# Patient Record
Sex: MALE | Race: WHITE | NOT HISPANIC OR LATINO | Employment: OTHER | URBAN - NONMETROPOLITAN AREA
[De-identification: names, ages, dates, MRNs, and addresses within clinical notes are randomized per-mention and may not be internally consistent; named-entity substitution may affect disease eponyms.]

---

## 2017-10-05 ENCOUNTER — TRANSFERRED RECORDS (OUTPATIENT)
Dept: HEALTH INFORMATION MANAGEMENT | Facility: OTHER | Age: 72
End: 2017-10-05

## 2018-12-06 ENCOUNTER — HOSPITAL ENCOUNTER (EMERGENCY)
Facility: OTHER | Age: 73
Discharge: ANOTHER HOSPITAL | End: 2018-12-06
Attending: PHYSICIAN ASSISTANT | Admitting: PHYSICIAN ASSISTANT
Payer: MEDICARE

## 2018-12-06 ENCOUNTER — APPOINTMENT (OUTPATIENT)
Dept: GENERAL RADIOLOGY | Facility: OTHER | Age: 73
End: 2018-12-06
Attending: PHYSICIAN ASSISTANT
Payer: MEDICARE

## 2018-12-06 VITALS
HEIGHT: 63 IN | DIASTOLIC BLOOD PRESSURE: 55 MMHG | TEMPERATURE: 98.2 F | HEART RATE: 120 BPM | RESPIRATION RATE: 42 BRPM | BODY MASS INDEX: 30.65 KG/M2 | SYSTOLIC BLOOD PRESSURE: 75 MMHG | OXYGEN SATURATION: 92 % | WEIGHT: 173 LBS

## 2018-12-06 DIAGNOSIS — I21.4 NSTEMI (NON-ST ELEVATED MYOCARDIAL INFARCTION) (H): ICD-10-CM

## 2018-12-06 DIAGNOSIS — I50.9 CONGESTIVE HEART FAILURE, UNSPECIFIED HF CHRONICITY, UNSPECIFIED HEART FAILURE TYPE (H): ICD-10-CM

## 2018-12-06 LAB
ALBUMIN SERPL-MCNC: 3.3 G/DL (ref 3.5–5.7)
ALBUMIN UR-MCNC: NEGATIVE MG/DL
ALP SERPL-CCNC: 41 U/L (ref 34–104)
ALT SERPL W P-5'-P-CCNC: 32 U/L (ref 7–52)
ANION GAP SERPL CALCULATED.3IONS-SCNC: 10 MMOL/L (ref 3–14)
APPEARANCE UR: CLEAR
AST SERPL W P-5'-P-CCNC: 27 U/L (ref 13–39)
BACTERIA #/AREA URNS HPF: ABNORMAL /HPF
BASOPHILS # BLD AUTO: 0.1 10E9/L (ref 0–0.2)
BASOPHILS NFR BLD AUTO: 0.7 %
BILIRUB SERPL-MCNC: 0.4 MG/DL (ref 0.3–1)
BILIRUB UR QL STRIP: NEGATIVE
BUN SERPL-MCNC: 16 MG/DL (ref 7–25)
CALCIUM SERPL-MCNC: 8.7 MG/DL (ref 8.6–10.3)
CHLORIDE SERPL-SCNC: 105 MMOL/L (ref 98–107)
CO2 SERPL-SCNC: 20 MMOL/L (ref 21–31)
COLOR UR AUTO: YELLOW
CREAT SERPL-MCNC: 0.99 MG/DL (ref 0.7–1.3)
D DIMER PPP DDU-MCNC: 281 NG/ML D-DU (ref 0–230)
DIFFERENTIAL METHOD BLD: ABNORMAL
EOSINOPHIL # BLD AUTO: 0.2 10E9/L (ref 0–0.7)
EOSINOPHIL NFR BLD AUTO: 2.4 %
ERYTHROCYTE [DISTWIDTH] IN BLOOD BY AUTOMATED COUNT: 13.2 % (ref 10–15)
GFR SERPL CREATININE-BSD FRML MDRD: 75 ML/MIN/1.7M2
GLUCOSE SERPL-MCNC: 152 MG/DL (ref 70–105)
GLUCOSE UR STRIP-MCNC: NEGATIVE MG/DL
HCO3 BLD-SCNC: 21 MMOL/L (ref 22–28)
HCT VFR BLD AUTO: 39.7 % (ref 40–53)
HGB BLD-MCNC: 13.2 G/DL (ref 13.3–17.7)
HGB UR QL STRIP: NEGATIVE
IMM GRANULOCYTES # BLD: 0.1 10E9/L (ref 0–0.4)
IMM GRANULOCYTES NFR BLD: 0.6 %
INR PPP: 1.17 (ref 0–1.3)
KETONES UR STRIP-MCNC: NEGATIVE MG/DL
LEUKOCYTE ESTERASE UR QL STRIP: ABNORMAL
LYMPHOCYTES # BLD AUTO: 1.9 10E9/L (ref 0.8–5.3)
LYMPHOCYTES NFR BLD AUTO: 23.1 %
MCH RBC QN AUTO: 31.1 PG (ref 26.5–33)
MCHC RBC AUTO-ENTMCNC: 33.2 G/DL (ref 31.5–36.5)
MCV RBC AUTO: 94 FL (ref 78–100)
MONOCYTES # BLD AUTO: 0.9 10E9/L (ref 0–1.3)
MONOCYTES NFR BLD AUTO: 10.4 %
NEUTROPHILS # BLD AUTO: 5.3 10E9/L (ref 1.6–8.3)
NEUTROPHILS NFR BLD AUTO: 62.8 %
NITRATE UR QL: NEGATIVE
NON-SQ EPI CELLS #/AREA URNS LPF: ABNORMAL /LPF
NT-PROBNP SERPL-MCNC: 977 PG/ML (ref 0–100)
O2/TOTAL GAS SETTING VFR VENT: 28 %
OXYHGB MFR BLD: 90 %
PCO2 BLD: 31 MM HG (ref 35–45)
PH BLD: 7.45 PH (ref 7.35–7.45)
PH UR STRIP: 5.5 PH (ref 5–9)
PLATELET # BLD AUTO: 342 10E9/L (ref 150–450)
PO2 BLD: 63 MM HG (ref 83–108)
POTASSIUM SERPL-SCNC: 3.9 MMOL/L (ref 3.5–5.1)
PROT SERPL-MCNC: 7.1 G/DL (ref 6.4–8.9)
RBC # BLD AUTO: 4.24 10E12/L (ref 4.4–5.9)
RBC #/AREA URNS AUTO: ABNORMAL /HPF
SODIUM SERPL-SCNC: 135 MMOL/L (ref 134–144)
SOURCE: ABNORMAL
SP GR UR STRIP: 1.02 (ref 1–1.03)
TROPONIN I SERPL-MCNC: 0.19 UG/L (ref 0–0.03)
UROBILINOGEN UR STRIP-ACNC: 0.2 EU/DL (ref 0.2–1)
WBC # BLD AUTO: 8.4 10E9/L (ref 4–11)
WBC #/AREA URNS AUTO: ABNORMAL /HPF

## 2018-12-06 PROCEDURE — 82805 BLOOD GASES W/O2 SATURATION: CPT | Performed by: PHYSICIAN ASSISTANT

## 2018-12-06 PROCEDURE — A9270 NON-COVERED ITEM OR SERVICE: HCPCS | Mod: GY | Performed by: PHYSICIAN ASSISTANT

## 2018-12-06 PROCEDURE — 99285 EMERGENCY DEPT VISIT HI MDM: CPT | Mod: 25 | Performed by: PHYSICIAN ASSISTANT

## 2018-12-06 PROCEDURE — 85379 FIBRIN DEGRADATION QUANT: CPT | Performed by: PHYSICIAN ASSISTANT

## 2018-12-06 PROCEDURE — 93005 ELECTROCARDIOGRAM TRACING: CPT | Performed by: PHYSICIAN ASSISTANT

## 2018-12-06 PROCEDURE — 85025 COMPLETE CBC W/AUTO DIFF WBC: CPT | Performed by: PHYSICIAN ASSISTANT

## 2018-12-06 PROCEDURE — 85610 PROTHROMBIN TIME: CPT | Performed by: PHYSICIAN ASSISTANT

## 2018-12-06 PROCEDURE — 40000275 ZZH STATISTIC RCP TIME EA 10 MIN

## 2018-12-06 PROCEDURE — 84484 ASSAY OF TROPONIN QUANT: CPT | Performed by: PHYSICIAN ASSISTANT

## 2018-12-06 PROCEDURE — 96365 THER/PROPH/DIAG IV INF INIT: CPT | Performed by: PHYSICIAN ASSISTANT

## 2018-12-06 PROCEDURE — 99285 EMERGENCY DEPT VISIT HI MDM: CPT | Mod: Z6 | Performed by: PHYSICIAN ASSISTANT

## 2018-12-06 PROCEDURE — 83880 ASSAY OF NATRIURETIC PEPTIDE: CPT | Performed by: PHYSICIAN ASSISTANT

## 2018-12-06 PROCEDURE — 96376 TX/PRO/DX INJ SAME DRUG ADON: CPT | Performed by: PHYSICIAN ASSISTANT

## 2018-12-06 PROCEDURE — 80053 COMPREHEN METABOLIC PANEL: CPT | Performed by: PHYSICIAN ASSISTANT

## 2018-12-06 PROCEDURE — 93010 ELECTROCARDIOGRAM REPORT: CPT | Performed by: INTERNAL MEDICINE

## 2018-12-06 PROCEDURE — 25000132 ZZH RX MED GY IP 250 OP 250 PS 637: Mod: GY | Performed by: PHYSICIAN ASSISTANT

## 2018-12-06 PROCEDURE — 96375 TX/PRO/DX INJ NEW DRUG ADDON: CPT | Performed by: PHYSICIAN ASSISTANT

## 2018-12-06 PROCEDURE — 81001 URINALYSIS AUTO W/SCOPE: CPT | Performed by: PHYSICIAN ASSISTANT

## 2018-12-06 PROCEDURE — 36600 WITHDRAWAL OF ARTERIAL BLOOD: CPT | Performed by: PHYSICIAN ASSISTANT

## 2018-12-06 PROCEDURE — 71045 X-RAY EXAM CHEST 1 VIEW: CPT | Mod: TC

## 2018-12-06 PROCEDURE — 25000128 H RX IP 250 OP 636: Performed by: PHYSICIAN ASSISTANT

## 2018-12-06 RX ORDER — HEPARIN SODIUM 10000 [USP'U]/100ML
0-3500 INJECTION, SOLUTION INTRAVENOUS CONTINUOUS
Status: DISCONTINUED | OUTPATIENT
Start: 2018-12-06 | End: 2018-12-07 | Stop reason: HOSPADM

## 2018-12-06 RX ORDER — HEPARIN SODIUM 5000 [USP'U]/.5ML
60 INJECTION, SOLUTION INTRAVENOUS; SUBCUTANEOUS ONCE
Status: COMPLETED | OUTPATIENT
Start: 2018-12-06 | End: 2018-12-06

## 2018-12-06 RX ORDER — FUROSEMIDE 10 MG/ML
20 INJECTION INTRAMUSCULAR; INTRAVENOUS ONCE
Status: COMPLETED | OUTPATIENT
Start: 2018-12-06 | End: 2018-12-06

## 2018-12-06 RX ORDER — NITROGLYCERIN 0.4 MG/1
0.4 TABLET SUBLINGUAL EVERY 5 MIN PRN
Status: DISCONTINUED | OUTPATIENT
Start: 2018-12-06 | End: 2018-12-07 | Stop reason: HOSPADM

## 2018-12-06 RX ORDER — FUROSEMIDE 10 MG/ML
40 INJECTION INTRAMUSCULAR; INTRAVENOUS ONCE
Status: DISCONTINUED | OUTPATIENT
Start: 2018-12-06 | End: 2018-12-06

## 2018-12-06 RX ORDER — ASPIRIN 81 MG/1
324 TABLET, CHEWABLE ORAL ONCE
Status: COMPLETED | OUTPATIENT
Start: 2018-12-06 | End: 2018-12-06

## 2018-12-06 RX ADMIN — HEPARIN SODIUM 4700 UNITS: 10000 INJECTION INTRAVENOUS; SUBCUTANEOUS at 21:43

## 2018-12-06 RX ADMIN — FUROSEMIDE 20 MG: 10 INJECTION, SOLUTION INTRAMUSCULAR; INTRAVENOUS at 21:59

## 2018-12-06 RX ADMIN — TICAGRELOR 180 MG: 90 TABLET ORAL at 21:41

## 2018-12-06 RX ADMIN — HEPARIN SODIUM 950 UNITS/HR: 10000 INJECTION, SOLUTION INTRAVENOUS at 21:44

## 2018-12-06 RX ADMIN — ASPIRIN 81 MG 324 MG: 81 TABLET ORAL at 21:41

## 2018-12-06 ASSESSMENT — ENCOUNTER SYMPTOMS
SHORTNESS OF BREATH: 1
COUGH: 1
ABDOMINAL PAIN: 0

## 2018-12-06 NOTE — ED AVS SNAPSHOT
"     Waqas Ferro #[id number] (CSN:[id number])  (73 year old M)  (Adm: 18)     [id number]               [facility]: [phone]            Patient Demographics     Patient Name Sex          Age SSN    Waqas Ferro Male 1945 (73 year old) [id number]      PCP and Center    Primary Care Provider  Phone Center     No primary care provider on file. None         Emergency Contact(s)     None on File      Admission Information     Attending Provider Admitting Provider Admission Type Admission Date/Time    Karthik Goodwin PA  Emergency 18    Discharge Date Hospital Service Auth/Cert Status Service Area     Emergency Medicine [facility]    Unit Room/Bed Admission Status        EMERGENCY DEPT ED03/ED03 Admission (Confirmed)       Admission     Complaint    None      Hospital Account     Name Acct ID Class Status Primary Coverage    Waqas Ferro [id number] Emergency Open MEDICARE - MEDICARE            Guarantor Account (for Hospital Account #[id number])     Name Relation to Pt Service Area Active? Acct Type    Wqaas Ferro Self FCS Yes Personal/Family    Address Phone                            Coverage Information (for Hospital Account #[id number])     F/O Payor/Plan Precert #    MEDICARE/MEDICARE     Subscriber Subscriber #    Waqas Ferro [id number]    Address Phone    ATT[employer] CLAIMS  [address]  [place] [address] [phone]                                                INTERAGENCY TRANSFER FORM - PHYSICIAN ORDERS   2018                       [facility]: [phone]            Attending Provider: Karthik Goodwin PA     Allergies:  No Known Allergies    Infection:  None   Service:  EMERGENCY ME    Ht:  1.6 m (5' 3\")   Wt:  78.5 kg (173 lb)   Admission Wt:  78.5 kg (173 lb)    BMI:  30.65 kg/m 2   BSA:  1.87 m 2            ED Clinical Impression     Diagnosis Description Comment " "Added By Time Added    NSTEMI (non-ST elevated myocardial infarction) (H) [I21.4] NSTEMI (non-ST elevated myocardial infarction) (H) [I21.4]  Karthik Goodwin PA 12/6/2018  9:46 PM    Congestive heart failure, unspecified HF chronicity, unspecified heart failure type (H) [I50.9] Congestive heart failure, unspecified HF chronicity, unspecified heart failure type (H) [I50.9]  Karthik Goodwin PA 12/6/2018  9:46 PM      Hospital Problems as of 12/6/2018     None      Non-Hospital Problems as of 12/6/2018     None      Code Status History     This patient does not have a recorded code status. Please follow your organizational policy for patients in this situation.      Current Code Status     This patient does not have a recorded code status. Please follow your organizational policy for patients in this situation.         Medication Review      Notice     You have not been prescribed any medications.                                                INTERAGENCY TRANSFER FORM - NURSING   12/6/2018                       [facility]: [phone]            Attending Provider: Karthik Goodwin PA     Allergies:  No Known Allergies    Infection:  None   Service:  EMERGENCY ME    Ht:  1.6 m (5' 3\")   Wt:  78.5 kg (173 lb)   Admission Wt:  78.5 kg (173 lb)    BMI:  30.65 kg/m 2   BSA:  1.87 m 2            Advance Directives        Scanned docmt in ACP Activity?           No scanned doc        Immunizations     None      ASSESSMENT     Discharge Profile Flowsheet     GASTROINTESTINAL (ADULT,PEDIATRIC,OB)     SKIN      GI WDL  WDL 12/06/18 2101   Skin WDL  ex;color 12/06/18 2101    COMMUNICATION ASSESSMENT     Skin Color/Characteristics  flushed 12/06/18 2101    Patient's communication style  spoken language (English or Bilingual) 12/06/18 2031                      Assessment WDL (Within Defined Limits) Definitions           Skin WDL     Effective: 09/28/15    Row Information: <b>WDL " "Definition:</b> Warm; dry; intact; elastic; without discoloration; pressure points without redness<br> <font color=\"gray\"><i>Item=AS skin wdl>>List=AS skin wdl>>Version=F14</i></font>      Vitals     Vital Signs Flowsheet     VITAL SIGNS     HEIGHT AND WEIGHT      Temp  98.2  F (36.8  C) 12/06/18 2032   Height  1.6 m (5' 3\") 12/06/18 2032    Temp src  Tympanic 12/06/18 2032   Height Method  Stated 12/06/18 2032    Resp  (!)  31 12/06/18 2128   Weight  78.5 kg (173 lb) 12/06/18 2032    Pulse  120 12/06/18 2034   BSA (Calculated - sq m)  1.87 12/06/18 2032    Heart Rate  105 12/06/18 2128   BMI (Calculated)  30.71 12/06/18 2032    Pulse/Heart Rate Source  Monitor 12/06/18 2034   EKG MONITORING      BP  111/79 12/06/18 2128   Cardiac Regularity  Regular 12/06/18 2100    OXYGEN THERAPY     Cardiac Rhythm  ST 12/06/18 2100    SpO2  96 % 12/06/18 2139   KAEL COMA SCALE      O2 Device  BiPAP/CPAP 12/06/18 2139   Best Eye Response  4-->(E4) spontaneous 12/06/18 2032    FiO2 (%)  30 % 12/06/18 2139   Best Motor Response  6-->(M6) obeys commands 12/06/18 2032    PAIN/COMFORT     Best Verbal Response  5-->(V5) oriented 12/06/18 2032    0-10 Pain Scale  0 12/06/18 2032   Kael Coma Scale Score  15 12/06/18 2032            Patient Lines/Drains/Airways Status    Active LINES/DRAINS/AIRWAYS     Name: Placement date: Placement time: Site: Days: Last dressing change:    Peripheral IV 12/06/18 Right 12/06/18 2049      less than 1             Patient Lines/Drains/Airways Status    Active PICC/CVC     None            Intake/Output Detail Report     None      Case Management/Discharge Planning     Case Management/Discharge Planning Flowsheet     ABUSE RISK SCREEN     OBSERVATION: Is there reason to believe there has been maltreatment of a vulnerable adult (ie. Physical/Sexual/Emotional abuse, self neglect, lack of adequate food, shelter, medical care, or financial exploitation)?  no 12/06/18 2033    QUESTION TO PATIENT:  Has a " member of your family or a partner(now or in the past) intimidated, hurt, manipulated, or controlled you in any way?  no 18   HOMICIDE RISK      QUESTION TO PATIENT: Do you feel safe going back to the place where you are living?  yes 18   Feels Like Hurting Others  no 18                  [facility]: [phone]            Medication Administration Report for Waqas Ferro as of 18   Legend:    Given Hold Not Given Due Canceled Entry Other Actions    Time Time (Time) Time  Time-Action       Inactive    Active    Linked        Medications 18      Dose: 40 mg  Freq: ONCE Route: IV  Start: 18   End: 18   Admin Instructions: For ordered IV doses 1-40 mg, give IV Push undiluted over 1-2 minutes.    Admin. Amount: 40 mg = 4 mL Conc: 10 mg/mL  Dispense Loc: EMERGENCY DEPARTMENT  Infused Over: 1-2 Minutes  Administrations Remainin  Volume: 4 mL           2145-Med Discontinued             heparin infusion 25,000 units in D5W 250 mL  Rate: 0-35 mL/hr Dose: 0-3500 Units/hr  Freq: CONTINUOUS Route: IV  Last Dose: Stopped (18)  Start: 18   Admin Instructions: Starting Infusion Rate = 950 Units/hr (actual weight) (12 units/kg/hr).  Low Intensity Heparin Treatment.  GOAL: Heparin Xa (10a) LEVEL = 0.15-0.35 (PTT = 45-65 seconds).  Max 1000 units/hr if patient getting TNK and greater than 70 kg.  Beginning with the Heparin Xa (10a) Level collected 6 hours after starting heparin, adjust heparin according to guidelines.    Release Heparin Xa (10a) Level order for blood draw 6 hours after start of infusion and 6 hours after any dose change.    If Xa (10a) less than 0.1 see bolus orders and INCREASE by 300 units/hr     If Xa (10a) 0.1 - 0.14 see bolus orders and INCREASE by 150 units/hr     If Xa (10a) 0.15 - 0.35 NO CHANGE in rate    if Xa (10a) 0.36 - 0.48 then  REDUCE by 150 units/hr     If Xa (10a) 0.49 - 0.66 then HOLD 30 mins and REDUCE by 200 units/hr     If Xa (10a) 0.67 - 1.31 then HOLD 60 mins and REDUCE by 300 units/hr     If Xa (10a) greater than 1.31 then HOLD 60 mins and REDUCE by 350 units/hr    Last Admin: 18  Dispense Loc: EMERGENCY DEPARTMENT  Volume: 250 mL   Current Line: Peripheral IV 18 Right           (950 Units/hr)-New Bag       214 (950 Units/hr)-Handoff       -ED Infusing on Admission/transfer           nitroGLYcerin (NITROSTAT) sublingual tablet 0.4 mg  Dose: 0.4 mg  Freq: EVERY 5 MIN PRN Route: SL  PRN Reason: chest pain  Start: 18   Admin. Amount: 1 tablet (1 × 0.4 mg tablet)  Dispense Loc: EMERGENCY DEPARTMENT              Completed Medications  Medications 18         Dose: 324 mg  Freq: ONCE Route: PO  Start: 18   End: 18   Admin. Amount: 4 tablet (4 × 81 mg tablet)  Last Admin: 18  Dispense Loc: EMERGENCY DEPARTMENT  Administrations Remainin            (324 mg)-Given             Dose: 20 mg  Freq: ONCE Route: IV  Start: 18   End: 18   Admin Instructions: For ordered IV doses 1-40 mg, give IV Push undiluted over 1-2 minutes.    Admin. Amount: 20 mg = 2 mL Conc: 10 mg/mL  Last Admin: 18  Dispense Loc: EMERGENCY DEPARTMENT  Infused Over: 1-2 Minutes  Administrations Remainin  Volume: 2 mL   Current Line: Peripheral IV 18 Right           (20 mg)-Given             Dose: 60 Units/kg  Weight Dosing Info: 78.5 kg  Freq: ONCE Route: IV  Start: 18   End: 18   Admin Instructions: Max Dose: 4000 units if patient getting TNK and greater than 70 kg.  Low Intensity Heparin Treatment.    Admin. Amount: 4,700 Units = 0.47 mL Conc: 5,000 Units/0.5 mL  Last Admin: 18 2145  Dispense Loc: EMERGENCY DEPARTMENT  Administrations Remainin  Volume: 0.5 mL    Current Line: Peripheral IV 18 Right          2143 (4,700 Units)-Given        (4,700 Units)-Handoff [C]             Dose: 180 mg  Freq: ONCE Route: PO  Start: 18   End: 18   Admin. Amount: 2 tablet (2 × 90 mg tablet)  Last Admin: 18  Dispense Loc: EMERGENCY DEPARTMENT  Administrations Remainin            (180 mg)-Given                    INTERAGENCY TRANSFER FORM - NOTES (H&P, Discharge Summary, Consults, Procedures, Therapies)   2018                       [facility]: [phone]            History & Physicals     No notes of this type exist for this encounter.      Discharge Summaries     No notes of this type exist for this encounter.      Consult Notes     No notes of this type exist for this encounter.      Progress Notes - Physician (Notes for yesterday and today)     No notes of this type exist for this encounter.      Procedure Notes     No notes of this type exist for this encounter.      Progress Notes - Therapies (Notes from 18 through 18)     No notes of this type exist for this encounter.                                          INTERAGENCY TRANSFER FORM - LAB / IMAGING / EKG / EMG RESULTS   2018                       [facility]: [phone]            Unresulted Labs (24h ago through future)    Start       Ordered    18 0600  CBC with platelets  (LOW intensity:  with LOADING DOSE (for patient weight less than 100 kg))  EVERY THREE DAYS,   STAT     Comments:  Every 3 days while on heparin    18 0600  Lactic acid  AM DRAW,   Routine      18 0600  Heparin Xa (10a) Level  (LOW intensity:  with LOADING DOSE (for patient weight less than 100 kg))  DAILY,   Routine      18    Unscheduled  Heparin Xa (10a) Level  (LOW intensity:  with LOADING DOSE (for patient weight less than 100 kg))  CONDITIONAL X 1 STAT,   STAT     Comments:   Release order 6 Hours after heparin is started    12/06/18 2132    Unscheduled  Heparin Xa (10a) Level  (LOW intensity:  with LOADING DOSE (for patient weight less than 100 kg))  CONDITIONAL (SPECIFY),   Routine     Comments:  Release order 6 hours after any heparin dose change    12/06/18 2132         Lab Results - 3 Days      Urine Microscopic [[id number]] (Abnormal)  Resulted: 12/06/18 2211, Result status: Final result    Ordering provider: Karthik Goodwin PA  12/06/18 2156 Resulting lab: [facility]    Specimen Information    Type Source Collected On     12/06/18 2156          Components       Value Reference Range Flag Lab   WBC Urine 5-10 OTO5^0 - 5 /HPF A [facility]   RBC Urine O - 2 OTO2^O - 2 /HPF  [facility]   Squamous Epithelial /LPF Urine Few FEW^Few /LPF  [facility]   Bacteria Urine Few NEG^Negative /HPF A [facility]            *UA reflex to Microscopic [[id number]] (Abnormal)  Resulted: 12/06/18 2207, Result status: Final result    Ordering provider: Karthik Goodwin PA  12/06/18 2045 Resulting lab: [facility]    Specimen Information    Type Source Collected On   Unspecified Urine Urine Midstream 12/06/18 2156          Components       Value Reference Range Flag Lab   Color Urine Yellow   [facility]   Appearance Urine Clear   [facility]   Glucose Urine Negative NEG^Negative mg/dL  [facility]   Bilirubin Urine Negative NEG^Negative  [facility]   Ket[place] Urine Negative NEG^Negative mg/dL  [facility]   Specific [place] Urine 1.025 1.000 - 1.030  [facility]   Blood Urine Negative NEG^Negative  [facility]   pH Urine 5.5 5.0 - 9.0 pH  [facility]   Protein Albumin Urine Negative NEG^Negative mg/dL  [facility]   Urobilinogen Urine 0.2 0.2 - 1.0 EU/dL  [facility]   Nitrite Urine Negative NEG^Negative  [facility]   Leukocyte Esterase Urine Trace NEG^Negative A [facility]   Source Unspecified Urine   [facility]            Comprehensive metabolic panel [[id number]]  (Abnormal)  Resulted: 12/06/18 2129, Result status: Final result    Ordering provider: Karthik Goodwin PA  12/06/18 2045 Resulting lab: [facility]    Specimen Information    Type Source Collected On   Blood  12/06/18 2101          Components       Value Reference Range Flag Lab   Sodium 135 134 - 144 mmol/L  [facility]   Potassium 3.9 3.5 - 5.1 mmol/L  [facility]   Chloride 105 98 - 107 mmol/L  [facility]   Carbon Dioxide 20 21 - 31 mmol/L L [facility]   Anion Gap 10 3 - 14 mmol/L  [facility]   Glucose 152 70 - 105 mg/dL H [facility]   Urea Nitrogen 16 7 - 25 mg/dL  [facility]   Creatinine 0.99 0.70 - 1.30 mg/dL  [facility]   GFR Estimate 75 >60 mL/min/1.7m2  [facility]   GFR Estimate If Black >90 >60 mL/min/1.7m2  [facility]   Calcium 8.7 8.6 - 10.3 mg/dL  [facility]   Bilirubin Total 0.4 0.3 - 1.0 mg/dL  [facility]   Albumin 3.3 3.5 - 5.7 g/dL L [facility]   Protein Total 7.1 6.4 - 8.9 g/dL  [facility]   Alkaline Phosphatase 41 34 - 104 U/L  [facility]   ALT 32 7 - 52 U/L  [facility]   AST 27 13 - 39 U/L  [facility]            Nt probnp inpatient [[id number]] (Abnormal)  Resulted: 12/06/18 2128, Result status: Final result    Ordering provider: Karthik Goodwin PA  12/06/18 2045 Resulting lab: [facility]    Specimen Information    Type Source Collected On   Blood  12/06/18 2101          Components       Value Reference Range Flag Lab   N-Terminal Pro BNP Inpatient 977 0 - 100 pg/mL H [facility]   Comment:            Reference range shown and results flagged as abnormal are suggested inpatient   cut points for confirming diagnosis if CHF in an acute setting. Establishing a   baseline value for each individual patient is useful for follow-up. An   inpatient or emergency department NT-proPBNP <300 pg/mL effectively rules out   acute CHF, with 99% negative predictive value.  The outpatient non-acute reference range for ruling out CHF is:   0-125 pg/mL (age 18 to less  than 75)   0-450 pg/mL (age 75 yrs and older)              Troponin I [[id number]] (Abnormal)  Resulted: 12/06/18 2125, Result status: Final result    Ordering provider: Karthik Goodwin PA  12/06/18 2045 Resulting lab: [facility]    Specimen Information    Type Source Collected On   Blood  12/06/18 2101          Components       Value Reference Range Flag Lab   Troponin I ES 0.190 0.000 - 0.034 ug/L H [facility]            D-Dimer (HI,GH) [[id number]] (Abnormal)  Resulted: 12/06/18 2115, Result status: Final result    Ordering provider: Karthik Goodwin PA  12/06/18 2045 Resulting lab: [facility]    Specimen Information    Type Source Collected On   Blood  12/06/18 2101          Components       Value Reference Range Flag Lab   D-Dimer ng/mL 281 0 - 230 ng/ml D-DU H [facility]            INR [[id number]]  Resulted: 12/06/18 2115, Result status: Final result    Ordering provider: Karthik Goodwin PA  12/06/18 2045 Resulting lab: [facility]    Specimen Information    Type Source Collected On   Blood  12/06/18 2101          Components       Value Reference Range Flag Lab   INR 1.17 0 - 1.3  [facility]            CBC with platelets differential [[id number]] (Abnormal)  Resulted: 12/06/18 2109, Result status: Final result    Ordering provider: Karthik Goodwin PA  12/06/18 2045 Resulting lab: [facility]    Specimen Information    Type Source Collected On   Blood  12/06/18 2101          Components       Value Reference Range Flag Lab   WBC 8.4 4.0 - 11.0 10e9/L  [facility]   RBC Count 4.24 4.4 - 5.9 10e12/L L [facility]   Hemoglobin 13.2 13.3 - 17.7 g/dL L [facility]   Hematocrit 39.7 40.0 - 53.0 % L [facility]   MCV 94 78 - 100 fl  [facility]   MCH 31.1 26.5 - 33.0 pg  [facility]   MCHC 33.2 31.5 - 36.5 g/dL  [facility]   RDW 13.2 10.0 - 15.0 %  [facility]   Platelet Count 342 150 - 450 10e9/L  [facility]   Diff Method  Automated Method   [facility]   % Neutrophils 62.8 %  [facility]   % Lymphocytes 23.1 %  [facility]   % Monocytes 10.4 %  [facility]   % Eosinophils 2.4 %  [facility]   % Basophils 0.7 %  [facility]   % Immature Granulocytes 0.6 %  [facility]   Absolute Neutrophil 5.3 1.6 - 8.3 10e9/L  [facility]   Absolute Lymphocytes 1.9 0.8 - 5.3 10e9/L  [facility]   Absolute Monocytes 0.9 0.0 - 1.3 10e9/L  [facility]   Absolute Eosinophils 0.2 0.0 - 0.7 10e9/L  [facility]   Absolute Basophils 0.1 0.0 - 0.2 10e9/L  [facility]   Abs Immature Granulocytes 0.1 0 - 0.4 10e9/L  [facility]            Blood gas arterial and oxyhgb [[id number]] (Abnormal)  Resulted: 12/06/18 2109, Result status: Final result    Ordering provider: Karthik Goodwin PA  12/06/18 2045 Resulting lab: [facility]    Specimen Information    Type Source Collected On   Blood  12/06/18 2100          Components       Value Reference Range Flag Lab   pH Arterial 7.45 7.35 - 7.45 pH  [facility]   pCO2 Arterial 31 35 - 45 mm Hg L [facility]   pO2 Arterial 63 83 - 108 mm Hg L [facility]   Bicarbonate Arterial 21 22 - 28 mmol/L L [facility]   FIO2 28   [facility]   Oxyhemoglobin Arterial 90 >95 % L [facility]            Testing Performed By     Lab - Abbreviation Name Director Address Valid Date Range    1743 - [facility] Unknown [address]  [facility] [address] 08/07/15 0948 - Present               Imaging Results - 3 Days      XR Chest Port 1 View [[id number]]  Resulted: 12/06/18 2157, Result status: Final result    Ordering provider: Karthik Goodwin PA  12/06/18 2045 Resulted by: EVY JETT    Performed: 12/06/18 2126 - 12/06/18 2134 Resulting lab: RADIOLOGY RESULTS    Narrative:       EXAM:    XR Chest, 1 View     EXAM DATE/TIME:    12/6/2018 9:26 PM     CLINICAL HISTORY:    73 years old, male; Signs and symptoms; Shortness of breath; Additional info:   SOB, productive cough, lungs?  Infection? Heart size?     TECHNIQUE:    XR of the chest, 1 view.     COMPARISON:    No relevant prior studies available.     FINDINGS:    Lungs:  Central pulmonary vascular congestion.  No consolidation.   Pleural space:  Small bilateral pleural effusions.    Heart/Mediastinum:  Cardiomegaly.    Bones/joints: Unremarkable.    Other findings:  Shallow depth of inspiration.       Impression:       IMPRESSION:   1. Cardiomegaly.   2. Central pulmonary vascular congestion.   3. Small bilateral pleural effusions.     THIS DOCUMENT HAS BEEN ELECTRONICALLY SIGNED BY EVY JETT MD    Specimen Information    Type Source Collected On     12/06/18 2126            Testing Performed By     Lab - Abbreviation Name Director Address Valid Date Range    104 - [facility] RADIOLOGY RESULTS Unknown Unknown 02/16/05 1553 - Present            Encounter-Level Documents:     There are no encounter-level documents.      Order-Level Documents:     There are no order-level documents.

## 2018-12-07 ENCOUNTER — TRANSFERRED RECORDS (OUTPATIENT)
Dept: HEALTH INFORMATION MANAGEMENT | Facility: OTHER | Age: 73
End: 2018-12-07

## 2018-12-07 LAB — EJECTION FRACTION: 30

## 2018-12-07 NOTE — ED TRIAGE NOTES
Pt here by himself with c/o increased SOB for past few weeks, pt is tachypneic and in mild respiratory distress, pt c/o chest pain with cough and states that he is coughing up stuff, VSS, pt into bay 3 via w.c

## 2018-12-07 NOTE — ED NOTES
"Attempted to catheterize patient, could not advance into urethra. Pt states he was \"re-channeled\" when he was a teenager, but \"they didn't tell me anything\".   Jennifer Guerin    "

## 2018-12-07 NOTE — ED PROVIDER NOTES
"  History     Chief Complaint   Patient presents with     Shortness of Breath     HPI  Waqas Ferro is a 73 year old male who presents to the ED today with chief complaint of shortness of breath.  Patient reports that he has had increased shortness of breath for the past few weeks and that today he is just tired of breathing so hard.    Problem List:    There are no active problems to display for this patient.       Past Medical History:    No past medical history on file.    Past Surgical History:    No past surgical history on file.    Family History:    No family history on file.    Social History:  Marital Status:  Single [1]  Social History   Substance Use Topics     Smoking status: Not on file     Smokeless tobacco: Not on file     Alcohol use Not on file        Medications:      No current outpatient prescriptions on file.      Review of Systems   Respiratory: Positive for cough and shortness of breath.    Cardiovascular: Negative for chest pain and leg swelling.   Gastrointestinal: Negative for abdominal pain.   All other systems reviewed and are negative.      Physical Exam   BP: (!) 130/92  Pulse: 120  Heart Rate: 109  Temp: 98.2  F (36.8  C)  Resp: 30  Height: 160 cm (5' 3\")  Weight: 78.5 kg (173 lb)  SpO2: 92 %      Physical Exam   Constitutional: He is oriented to person, place, and time. He appears distressed.   HENT:   Head: Normocephalic and atraumatic.   Eyes: EOM are normal. Pupils are equal, round, and reactive to light.   Neck: Normal range of motion.   Cardiovascular: Regular rhythm and normal heart sounds.    No murmur heard.  Tachycardic     Pulmonary/Chest: Effort normal and breath sounds normal. No respiratory distress. He has no wheezes. He exhibits no tenderness.   Abdominal: Soft. Bowel sounds are normal. He exhibits no distension and no mass. There is no tenderness. There is no rebound.   Musculoskeletal: Normal range of motion.   Neurological: He is alert and oriented to person, " place, and time. No cranial nerve deficit. Coordination normal.   Skin: Skin is warm. He is not diaphoretic.   Psychiatric: He has a normal mood and affect. His behavior is normal. Judgment and thought content normal.       ED Course     ED Course     Procedures          EKG read at 2056, sinus tachycardia, , inverted T waves in 1 aVL    Critical Care time:  none               Results for orders placed or performed during the hospital encounter of 12/06/18 (from the past 24 hour(s))   Blood gas arterial and oxyhgb   Result Value Ref Range    pH Arterial 7.45 7.35 - 7.45 pH    pCO2 Arterial 31 (L) 35 - 45 mm Hg    pO2 Arterial 63 (L) 83 - 108 mm Hg    Bicarbonate Arterial 21 (L) 22 - 28 mmol/L    FIO2 28     Oxyhemoglobin Arterial 90 (L) >95 %   CBC with platelets differential   Result Value Ref Range    WBC 8.4 4.0 - 11.0 10e9/L    RBC Count 4.24 (L) 4.4 - 5.9 10e12/L    Hemoglobin 13.2 (L) 13.3 - 17.7 g/dL    Hematocrit 39.7 (L) 40.0 - 53.0 %    MCV 94 78 - 100 fl    MCH 31.1 26.5 - 33.0 pg    MCHC 33.2 31.5 - 36.5 g/dL    RDW 13.2 10.0 - 15.0 %    Platelet Count 342 150 - 450 10e9/L    Diff Method Automated Method     % Neutrophils 62.8 %    % Lymphocytes 23.1 %    % Monocytes 10.4 %    % Eosinophils 2.4 %    % Basophils 0.7 %    % Immature Granulocytes 0.6 %    Absolute Neutrophil 5.3 1.6 - 8.3 10e9/L    Absolute Lymphocytes 1.9 0.8 - 5.3 10e9/L    Absolute Monocytes 0.9 0.0 - 1.3 10e9/L    Absolute Eosinophils 0.2 0.0 - 0.7 10e9/L    Absolute Basophils 0.1 0.0 - 0.2 10e9/L    Abs Immature Granulocytes 0.1 0 - 0.4 10e9/L   Comprehensive metabolic panel   Result Value Ref Range    Sodium 135 134 - 144 mmol/L    Potassium 3.9 3.5 - 5.1 mmol/L    Chloride 105 98 - 107 mmol/L    Carbon Dioxide 20 (L) 21 - 31 mmol/L    Anion Gap 10 3 - 14 mmol/L    Glucose 152 (H) 70 - 105 mg/dL    Urea Nitrogen 16 7 - 25 mg/dL    Creatinine 0.99 0.70 - 1.30 mg/dL    GFR Estimate 75 >60 mL/min/1.7m2    GFR Estimate If Black >90  >60 mL/min/1.7m2    Calcium 8.7 8.6 - 10.3 mg/dL    Bilirubin Total 0.4 0.3 - 1.0 mg/dL    Albumin 3.3 (L) 3.5 - 5.7 g/dL    Protein Total 7.1 6.4 - 8.9 g/dL    Alkaline Phosphatase 41 34 - 104 U/L    ALT 32 7 - 52 U/L    AST 27 13 - 39 U/L   D-Dimer (HI,GH)   Result Value Ref Range    D-Dimer ng/mL 281 (H) 0 - 230 ng/ml D-DU   INR   Result Value Ref Range    INR 1.17 0 - 1.3   Troponin I   Result Value Ref Range    Troponin I ES 0.190 (H) 0.000 - 0.034 ug/L   Nt probnp inpatient   Result Value Ref Range    N-Terminal Pro BNP Inpatient 977 (H) 0 - 100 pg/mL   XR Chest Port 1 View    Narrative    EXAM:    XR Chest, 1 View     EXAM DATE/TIME:    12/6/2018 9:26 PM     CLINICAL HISTORY:    73 years old, male; Signs and symptoms; Shortness of breath; Additional info:   SOB, productive cough, lungs? Infection? Heart size?     TECHNIQUE:    XR of the chest, 1 view.     COMPARISON:    No relevant prior studies available.     FINDINGS:    Lungs:  Central pulmonary vascular congestion.  No consolidation.   Pleural space:  Small bilateral pleural effusions.    Heart/Mediastinum:  Cardiomegaly.    Bones/joints: Unremarkable.    Other findings:  Shallow depth of inspiration.       Impression    IMPRESSION:   1. Cardiomegaly.   2. Central pulmonary vascular congestion.   3. Small bilateral pleural effusions.     THIS DOCUMENT HAS BEEN ELECTRONICALLY SIGNED BY EVY JETT MD   *UA reflex to Microscopic   Result Value Ref Range    Color Urine Yellow     Appearance Urine Clear     Glucose Urine Negative NEG^Negative mg/dL    Bilirubin Urine Negative NEG^Negative    Ketones Urine Negative NEG^Negative mg/dL    Specific Gravity Urine 1.025 1.000 - 1.030    Blood Urine Negative NEG^Negative    pH Urine 5.5 5.0 - 9.0 pH    Protein Albumin Urine Negative NEG^Negative mg/dL    Urobilinogen Urine 0.2 0.2 - 1.0 EU/dL    Nitrite Urine Negative NEG^Negative    Leukocyte Esterase Urine Trace (A) NEG^Negative    Source Unspecified Urine     Urine Microscopic   Result Value Ref Range    WBC Urine 5-10 (A) OTO5^0 - 5 /HPF    RBC Urine O - 2 OTO2^O - 2 /HPF    Squamous Epithelial /LPF Urine Few FEW^Few /LPF    Bacteria Urine Few (A) NEG^Negative /HPF       Medications   heparin infusion 25,000 units in D5W 250 mL (0 Units/hr Intravenous ED Infusing on Admission/transfer 12/6/18 2211)   nitroGLYcerin (NITROSTAT) sublingual tablet 0.4 mg (not administered)   aspirin (ASA) chewable tablet 324 mg (324 mg Oral Given 12/6/18 2141)   heparin loading dose for LOW INTENSITY TREATMENT * Give BEFORE starting heparin infusion (4,700 Units Intravenous Handoff 12/6/18 2149)   ticagrelor (BRILINTA) tablet 180 mg (180 mg Oral Given 12/6/18 2141)   furosemide (LASIX) injection 20 mg (20 mg Intravenous Given 12/6/18 2159)       Assessments & Plan (with Medical Decision Making)   Patient seen and examined.  Patient appears in mild distress due to increased work of breathing.  Lung sounds slightly diminished.  Patient placed on BiPAP.  Patient has a unremarkable CBC and CMP.  Troponin of 0.190, d-dimer 281, .  Chest x-ray shows cardiomegaly with pulmonary vascular congestion as well as pleural effusions bilaterally.  Patient started on heparin bolus and drip, Brilinta, Lasix.  Patient remained hemodynamically stable while in the ED.  , [facility], accepted patient for further observation, management, treatment.  Patient was transferred.    Karthik Goodwin PA-C    I have reviewed the nursing notes.    I have reviewed the findings, diagnosis, plan and need for follow up with the patient.       New Prescriptions    No medications on file       Final diagnoses:   NSTEMI (non-ST elevated myocardial infarction) (H)   Congestive heart failure, unspecified HF chronicity, unspecified heart failure type (H)       12/6/2018   [facility] AND [facility]     Karthik Goodwin PA  12/06/18 5413

## 2018-12-09 ENCOUNTER — TRANSFERRED RECORDS (OUTPATIENT)
Dept: HEALTH INFORMATION MANAGEMENT | Facility: OTHER | Age: 73
End: 2018-12-09

## 2018-12-10 ENCOUNTER — TRANSFERRED RECORDS (OUTPATIENT)
Dept: HEALTH INFORMATION MANAGEMENT | Facility: OTHER | Age: 73
End: 2018-12-10

## 2018-12-13 ENCOUNTER — TELEPHONE (OUTPATIENT)
Dept: CARDIAC REHAB | Facility: OTHER | Age: 73
End: 2018-12-13

## 2018-12-13 NOTE — TELEPHONE ENCOUNTER
Called pt who verified his .  Called re:  Cardiac rehab referral.  Instructed on goals and program.  Patient states he has his RTC appointment at [facility] on 19 and he requested to wait until after that appointment before he starts cardiac rehab.  Plan:  We will call him back after 19 per his request.

## 2019-01-16 ENCOUNTER — TELEPHONE (OUTPATIENT)
Dept: CARDIAC REHAB | Facility: OTHER | Age: 74
End: 2019-01-16

## 2019-01-16 NOTE — TELEPHONE ENCOUNTER
Called pt back after he had his RTC [facility] per his request.  He states he has stress test at [facility] on 1/23/19 and will see .  Requests that we recall him after that appointment to see what the POC will be.

## 2019-01-23 ENCOUNTER — TRANSFERRED RECORDS (OUTPATIENT)
Dept: HEALTH INFORMATION MANAGEMENT | Facility: OTHER | Age: 74
End: 2019-01-23

## 2019-01-27 ENCOUNTER — TRANSFERRED RECORDS (OUTPATIENT)
Dept: HEALTH INFORMATION MANAGEMENT | Facility: OTHER | Age: 74
End: 2019-01-27

## 2019-02-04 ENCOUNTER — OFFICE VISIT (OUTPATIENT)
Dept: FAMILY MEDICINE | Facility: OTHER | Age: 74
End: 2019-02-04
Attending: FAMILY MEDICINE
Payer: MEDICARE

## 2019-02-04 VITALS
BODY MASS INDEX: 30.54 KG/M2 | RESPIRATION RATE: 28 BRPM | SYSTOLIC BLOOD PRESSURE: 102 MMHG | DIASTOLIC BLOOD PRESSURE: 70 MMHG | OXYGEN SATURATION: 97 % | WEIGHT: 172.4 LBS | HEART RATE: 90 BPM

## 2019-02-04 DIAGNOSIS — I50.23 ACUTE ON CHRONIC SYSTOLIC CONGESTIVE HEART FAILURE (H): ICD-10-CM

## 2019-02-04 PROCEDURE — 99213 OFFICE O/P EST LOW 20 MIN: CPT | Performed by: FAMILY MEDICINE

## 2019-02-04 PROCEDURE — G0463 HOSPITAL OUTPT CLINIC VISIT: HCPCS

## 2019-02-04 RX ORDER — CLOPIDOGREL BISULFATE 75 MG/1
75 TABLET ORAL DAILY
Refills: 2 | COMMUNITY
Start: 2018-12-10 | End: 2023-12-20

## 2019-02-04 RX ORDER — FUROSEMIDE 40 MG
80 TABLET ORAL
Refills: 2 | Status: ON HOLD | COMMUNITY
Start: 2018-12-10 | End: 2022-07-27

## 2019-02-04 RX ORDER — ASPIRIN 81 MG/1
81 TABLET, CHEWABLE ORAL DAILY
Refills: 2 | COMMUNITY
Start: 2018-12-10

## 2019-02-04 RX ORDER — METOPROLOL SUCCINATE 25 MG/1
25 TABLET, EXTENDED RELEASE ORAL 2 TIMES DAILY
Refills: 2 | Status: ON HOLD | COMMUNITY
Start: 2018-12-10 | End: 2022-07-24

## 2019-02-04 RX ORDER — LATANOPROST 50 UG/ML
1 SOLUTION/ DROPS OPHTHALMIC AT BEDTIME
Refills: 0 | Status: ON HOLD | COMMUNITY
Start: 2019-01-31 | End: 2022-07-24

## 2019-02-04 RX ORDER — ATORVASTATIN CALCIUM 40 MG/1
40 TABLET, FILM COATED ORAL AT BEDTIME
Refills: 2 | COMMUNITY
Start: 2018-12-10 | End: 2023-12-20

## 2019-02-04 ASSESSMENT — PAIN SCALES - GENERAL: PAINLEVEL: NO PAIN (0)

## 2019-02-04 ASSESSMENT — PATIENT HEALTH QUESTIONNAIRE - PHQ9: SUM OF ALL RESPONSES TO PHQ QUESTIONS 1-9: 0

## 2019-02-04 NOTE — PROGRESS NOTES
SUBJECTIVE:   Waqas Ferro is a 73 year old male who presents to clinic today for the following health issues: Follow-up congestive heart failure    Patient arrives here for follow-up congestive heart failure.  Recently hospitalized at [facility].  He was diuresed and reports that he has noticed quite a bit of swelling resolving in his lower legs.  His activity has increased.  Very little short of breath.  Not complaining of PND or orthopnea.  He does have an appointment with cardiology later in the week          Patient Active Problem List    Diagnosis Date Noted     Acute on chronic systolic congestive heart failure (H) 02/04/2019     Priority: Medium     No past medical history on file.   No past surgical history on file.    Review of Systems     OBJECTIVE:     /70   Pulse 90   Resp 28   Wt 78.2 kg (172 lb 6.4 oz)   SpO2 97%   BMI 30.54 kg/m    Body mass index is 30.54 kg/m .  Physical Exam   Constitutional: He appears well-developed.   Eyes: Pupils are equal, round, and reactive to light.   Cardiovascular: Normal rate.   Pulmonary/Chest: Effort normal and breath sounds normal.   Musculoskeletal: Normal range of motion.   Trace edema in the lower extremities   Neurological: He is alert.           ASSESSMENT/PLAN:         1. Acute on chronic systolic congestive heart failure (H)  Currently stable.  Follow-up with cardiology.  His notes from his hospitalization was reviewed and I do not see any recommendations for follow-up labs.        Hugo Hannah MD  [facility] AND [facility]

## 2019-02-04 NOTE — NURSING NOTE
Patient here for hospital follow up form [facility] after SOB and congestion and he was sent to [place]. Breathing today is normal without problems. Medication Reconciliation: complete.    Kajal Red LPN  2/4/2019 10:40 AM

## 2019-03-12 ENCOUNTER — TRANSFERRED RECORDS (OUTPATIENT)
Dept: HEALTH INFORMATION MANAGEMENT | Facility: OTHER | Age: 74
End: 2019-03-12

## 2019-03-15 ENCOUNTER — TRANSFERRED RECORDS (OUTPATIENT)
Dept: HEALTH INFORMATION MANAGEMENT | Facility: OTHER | Age: 74
End: 2019-03-15

## 2019-04-04 ENCOUNTER — TELEPHONE (OUTPATIENT)
Dept: CARDIAC REHAB | Facility: OTHER | Age: 74
End: 2019-04-04

## 2019-04-04 DIAGNOSIS — Z95.5 S/P CORONARY ARTERY STENT PLACEMENT: Primary | ICD-10-CM

## 2019-04-04 NOTE — TELEPHONE ENCOUNTER
Pt verified his .  Referred for cardiac rehab from [facility].  Instructed on goal and program.  Pt agreed to intake on 19.  Pt verbalized understanding.

## 2019-04-09 ENCOUNTER — HOSPITAL ENCOUNTER (OUTPATIENT)
Dept: CARDIAC REHAB | Facility: OTHER | Age: 74
End: 2019-04-09
Attending: INTERNAL MEDICINE
Payer: MEDICARE

## 2019-04-09 VITALS — HEIGHT: 63 IN | WEIGHT: 165.6 LBS | BODY MASS INDEX: 29.34 KG/M2

## 2019-04-09 DIAGNOSIS — Z95.5 S/P CORONARY ARTERY STENT PLACEMENT: ICD-10-CM

## 2019-04-09 PROCEDURE — 93798 PHYS/QHP OP CAR RHAB W/ECG: CPT

## 2019-04-09 PROCEDURE — 40000116 ZZH STATISTIC OP CR VISIT

## 2019-04-09 SDOH — SOCIAL STABILITY: SOCIAL NETWORK: HOW OFTEN DO YOU ATTEND CHURCH OR RELIGIOUS SERVICES?: NEVER

## 2019-04-09 SDOH — SOCIAL STABILITY: SOCIAL NETWORK
DO YOU BELONG TO ANY CLUBS OR ORGANIZATIONS SUCH AS CHURCH GROUPS UNIONS, FRATERNAL OR ATHLETIC GROUPS, OR SCHOOL GROUPS?: NO

## 2019-04-09 SDOH — ECONOMIC STABILITY: FOOD INSECURITY: WITHIN THE PAST 12 MONTHS, THE FOOD YOU BOUGHT JUST DIDN'T LAST AND YOU DIDN'T HAVE MONEY TO GET MORE.: NEVER TRUE

## 2019-04-09 SDOH — HEALTH STABILITY: MENTAL HEALTH
STRESS IS WHEN SOMEONE FEELS TENSE, NERVOUS, ANXIOUS, OR CAN'T SLEEP AT NIGHT BECAUSE THEIR MIND IS TROUBLED. HOW STRESSED ARE YOU?: TO SOME EXTENT

## 2019-04-09 SDOH — ECONOMIC STABILITY: TRANSPORTATION INSECURITY
IN THE PAST 12 MONTHS, HAS THE LACK OF TRANSPORTATION KEPT YOU FROM MEDICAL APPOINTMENTS OR FROM GETTING MEDICATIONS?: NO

## 2019-04-09 SDOH — SOCIAL STABILITY: SOCIAL NETWORK: HOW OFTEN DO YOU ATTENT MEETINGS OF THE CLUB OR ORGANIZATION YOU BELONG TO?: NOT ASKED

## 2019-04-09 SDOH — SOCIAL STABILITY: SOCIAL NETWORK: IN A TYPICAL WEEK, HOW MANY TIMES DO YOU TALK ON THE PHONE WITH FAMILY, FRIENDS, OR NEIGHBORS?: ONCE A WEEK

## 2019-04-09 SDOH — HEALTH STABILITY: PHYSICAL HEALTH: ON AVERAGE, HOW MANY DAYS PER WEEK DO YOU ENGAGE IN MODERATE TO STRENUOUS EXERCISE (LIKE A BRISK WALK)?: 0 DAYS

## 2019-04-09 SDOH — SOCIAL STABILITY: SOCIAL NETWORK: HOW OFTEN DO YOU GET TOGETHER WITH FRIENDS OR RELATIVES?: ONCE A WEEK

## 2019-04-09 SDOH — ECONOMIC STABILITY: FOOD INSECURITY: WITHIN THE PAST 12 MONTHS, YOU WORRIED THAT YOUR FOOD WOULD RUN OUT BEFORE YOU GOT MONEY TO BUY MORE.: NEVER TRUE

## 2019-04-09 SDOH — ECONOMIC STABILITY: INCOME INSECURITY: HOW HARD IS IT FOR YOU TO PAY FOR THE VERY BASICS LIKE FOOD, HOUSING, MEDICAL CARE, AND HEATING?: SOMEWHAT HARD

## 2019-04-09 SDOH — HEALTH STABILITY: PHYSICAL HEALTH: ON AVERAGE, HOW MANY MINUTES DO YOU ENGAGE IN EXERCISE AT THIS LEVEL?: 0 MIN

## 2019-04-09 SDOH — ECONOMIC STABILITY: TRANSPORTATION INSECURITY
IN THE PAST 12 MONTHS, HAS LACK OF TRANSPORTATION KEPT YOU FROM MEETINGS, WORK, OR FROM GETTING THINGS NEEDED FOR DAILY LIVING?: NO

## 2019-04-09 ASSESSMENT — 6 MINUTE WALK TEST (6MWT)
FEMALE CALC: 1364.83
PREDICTED: 1347.82
MALE CALC: 1339.65

## 2019-04-09 ASSESSMENT — PATIENT HEALTH QUESTIONNAIRE - PHQ9: SUM OF ALL RESPONSES TO PHQ QUESTIONS 1-9: 4

## 2019-04-09 ASSESSMENT — MIFFLIN-ST. JEOR: SCORE: 1396.29

## 2019-04-09 NOTE — PROGRESS NOTES
04/09/19 1035   Session   Session Initial Evaluation and Exercise Prescription   Certified through this date 05/08/19   Cardiac Rehab Assessment   Cardiac Rehab Assessment 4/9/19 Initial cardiac rehab visit after NSTEMI on  12/6/19, and PCI 3/14/19 at [facility]. EF 30 %, and CHF problems with dyspnea developed. Patient has been active in home only, due to weather, is dyspnic with activity. He was able to walk 10 minutes, but short of breath and HR exceeding target range. Edema to lower extremities 1+, lungs clear bilaterally. Rhythm sinus. No chest pain, only knees arthritic. Goal #1 Exercise 30-45 minutes, 3 x week by 1 month.  Goal #2 Eat lower salt foods, read labels, and do daily weights, by 1 month, to prevent return to the hospital. Goal # 3 Eat smaller portions and exercise, to loose 1-2 lbs per month.    General Information   Treatment Diagnosis NSTEMI   Date of Treatment Diagnosis 12/06/19   Secondary Treatment Diagnosis Angioplasty   Significant Past CV History History of Heart Failure   Lead up symptoms dyspnea   Hospital Location [facility] Discharge Date 03/17/19   Signs and Symptoms Post Hospital Discharge None   Outpatient Cardiac Rehab Start Date 04/09/19   Primary Physician Brielle   Primary Physician Follow Up Completed   Cardiologist Dr. Miranda   Cardiologist Follow Up Scheduled   Risk Stratification High   Summary of Cath Report   Summary of Cath Report Available   Date Performed 12/09/18   Left Main 40-50      Ramus 90   OM 90   Cath Report Comments see report   Living and Work Status    Living Arrangements and Social Status house;roommate   Support System Live with an adult   Return to Employment Retired   Occupation    Preventative Medications   CMS recommended medications Beta Blocker;Antiplatelets;Lipid Lowering   Fall Risk Screen   Fall screen completed by Cardiac Rehab   Have you fallen 2 or more times in the past year? No   Have you fallen and had an  "injury in the past year? No   Is patient a fall risk? No   Abuse Screen (yes response referral indicated)   Feels Unsafe at Home or Work/School no   Feels Threatened by Someone no   Does Anyone Try to Keep You From Having Contact with Others or Doing Things Outside Your Home? no   Physical Signs of Abuse Present no   Pain   Patient Currently in Pain Yes   Pain Location Hips   Pain Rating 2/10   Pain Description Discomfort   Pain Description Comment arthritis   Physical Assessments   Incisions WNL   Edema +1 Trace   Right Lung Sounds normal   Left Lung Sounds normal   Limitations Arthritis   Comments new to TM, standby till stable   Individualized Treatment Plan   Monitored Sessions Scheduled 36   Monitored Sessions Attended 1   Oxygen   Supplemental Oxygen needed No   Nutrition Management - Weight Management   Assessment Initial Assessment   Age 74   Weight 75.1 kg (165 lb 9.6 oz)   Height 1.6 m (5' 3\")   BMI (Calculated) 29.33   Goal Weight 70.3 kg (155 lb)   Initial Rate Your Plate Score. Dietary tool to assess eating patterns. Scores range from 24 to 72. The higher the score the healthier the eating pattern.   (given)   Weight Management Comments has lost 7 lbs, 2 months   Nutrition Management - Lipids   Lipids Labs Not Available   Prescribed Lipid Medication Yes   Statin Intensity Moderate Intensity   Nutrition Management - Diabetes   Diabetes No   Nutrition Management Summary   Dietary Recommendations Low Fat;Low Cholesterol;Low Sodium   Stages of Change for Diet Compliance Contemplation   Interventions Planned Attend Nutrition Education Class(es);Complete Food Diary;Educate on Weight Management Principles;Educate on Benefits of Exercise   Interventions In Progress or Completed Educated on Benefits of Exercise   Patient Goals Goal #1   Goal #1 Description Exercise 30-40 minutes 3 x week by 1 month   Goal #1 Target Date 05/08/19   Nutrition Summary Comments encouraged smaller portions, healthy choices "   Nutrition Target Outcome   (low salt, weight loss 1-2 lbs /month)   Psychosocial Management   Psychosocial Assessment Initial   Is there history of clinical depression or increased risk of depression?   (unkown)   Current Level of Stress per Patient Report Mild   Current Coping Skills Has Positive Support System   Initial Patient Health Questionnaire -9 Score (PHQ-9) for depression. 5-9 Minimal symptoms, 10-14 Minor depression, 15-19 Major depression, moderately severe, > 20 Major depression, severe  4   Initial [facility] Survey score.  Quality of Life:   If total score > 25 review individual areas where patient rated a 4 or 5.  Consider patients current medical condition and what role that plays on the score.   Adjust treatment protocol to improve areas of concern.  Consider the following:  PHQ9 score, DASI, and re-assessment within the next 30 days to assist with developing treatments.  17   Stages of Change Contemplation   Patient Goal No   Other Core Components - Hypertension   History of or Diagnosis of Hypertension No   Currently taking Anti-Hypertensives Beta blocker   Hypertension Comments BP low, 80's systolic   Other Core Components - Tobacco   History of Tobacco Use Yes   Quit Date or Planned Quit Date   (1980)   Tobacco Use Status Former (Quit > 6 mo ago)   Tobacco Habit Cigarettes   Tobacco Use per Day (average)   (4 ppd)   Years of Tobacco Use 20   Stages of Change Maintenance   Other Core Components Summary   Interventions Planned List benefits of weight management;Educate on importance of maintaining low sodium diet;None: Patient is in maintenance for smoking cessation;Educate on importance of monitoring daily weight;Educate on signs/symptoms and when to call the doctor (i.e. increased edema, dyspnea, fatigue, dizziness/lightheadedness, change in sleep patterns, chest discomfort)   Interventions In Progress or Completed Listed benefits of weight management;Educated on importance of maintaining  low sodium diet;Educated on importance of monitoring daily weight;Educated on signs/symptoms and when to call the doctor   Patient Goals Yes   Goal #1 Description Limit salty foods, do daily weight, take Lasix, note weight gain 3-5 lbs, call doctor by 1 month   Goal #1 Target Date 05/08/19   Activity/Exercise History   Activity/Exercise Assessment Initial   Number of Days Currently participating in Moderate Physical Activity?   (0)   Number of Days Currently performing  Aerobic Exercise (including rehab)? 0   Current Stage of Change (Physical Activity) Contemplation   Patient Goals Goal #1   Goal #1 Description Loose 1-2 lbs per 1 month   Goal #1 Target Date 05/08/19   Activity/Exercise Comments exercise to keep HR in target range, gradually increase work, length to symptoms dyspnea   Exercise Assessment   6 Minute Walk Predicted (Male) 1339.65   6 Minute Walk Predicted (Female) 1364.83   Resting HR 94 bpm   Exercise  bpm   Post Exercise HR 86 bpm   Resting BP 84/60   Exercise BP 96/60   Post Exercise BP 86/64   Pre SpO2 94   While Exercising SpO2 99   Post SpO2 97   Effort Rating 3   Current MET Level 2.1   MET Level Goal 4   ECG Rhythm Sinus rhythm   Ectopy None   Current Symptoms Dyspnea   Limitations/Restrictions Orthopedic (see comments)   Exercise Prescription   Mode Treadmill   Duration/Time 30-45 min   Frequency 3 daysweek   THR (85% of age predicted max HR) 125.8   OMNI Effort Rating (0-10 Scale) 4-6/10   Progression Aerobic exercise to OMNI rating of 6 or below and at or below THR   Recommended Home Exercise   Type of Exercise Walking   Frequency (days per week) 3   Duration (minutes per session) 15-30 min   Effort Rating Recommended 4-6/10   Current Home Exercise   Type of Exercise Walking   Frequency (days per week) 3   Duration (minutes per session) 30   Follow-up/On-going Support   Provider follow-up needed on the following Blood Pressure;Weight Management   Learning Assessment   Learner Patient    Primary Language English   Preferred Learning Style Listening   Barriers to Learning No barriers noted   Patient Education   Education recommended Anatomy and Physiology of the Heart;Exercise Principles;Heart Failure;Medication Overview;Nutrition;Risk Factors;Weight Loss   Education classes attended Risk Factors;Heart Failure   The patient's history and clinical status including hemodynamics and ECG were evaluated. The patient was assessed to be stable and appropriate to begin exercise.   The patient's functional capacity and exercise prescription were determined by walking to breath stress. The patient was oriented to the program.  Risk factor profile was completed. Goals and objectives were discussed. CV response was WNL. No symptoms, complaints or pain were reported. Good prognosis for reaching goals below. Skilled therapy is necessary in order to monitor CV response to exercise, to provide education on risk factors and behavior change counseling needed to achieve patient's goals.  Plan to progress to 30-40 minutes of exercise prior to discharge from cardiac rehab.  Initial THR of 20-30 beats above RHR; Effort rating of 4-6. Initiate muscle conditioning as appropriate. Provide risk factor education and behavior change counseling.     Physician cosignature/electronic signature indicates approval of this ITP document. I have established, reviewed and made necessary changes to the individualized treatment plan and exercise prescription for this patient.

## 2019-04-15 ENCOUNTER — HOSPITAL ENCOUNTER (OUTPATIENT)
Dept: CARDIAC REHAB | Facility: OTHER | Age: 74
End: 2019-04-15
Attending: INTERNAL MEDICINE
Payer: MEDICARE

## 2019-04-15 PROCEDURE — 40000575 ZZH STATISTIC OP CARDIAC VISIT #2

## 2019-04-15 PROCEDURE — 40000116 ZZH STATISTIC OP CR VISIT

## 2019-04-15 PROCEDURE — 93798 PHYS/QHP OP CAR RHAB W/ECG: CPT

## 2019-04-15 PROCEDURE — 93797 PHYS/QHP OP CAR RHAB WO ECG: CPT | Mod: XU

## 2019-04-19 ENCOUNTER — HOSPITAL ENCOUNTER (OUTPATIENT)
Dept: CARDIAC REHAB | Facility: OTHER | Age: 74
End: 2019-04-19
Attending: INTERNAL MEDICINE
Payer: MEDICARE

## 2019-04-19 PROCEDURE — 93798 PHYS/QHP OP CAR RHAB W/ECG: CPT

## 2019-04-19 PROCEDURE — 40000116 ZZH STATISTIC OP CR VISIT

## 2019-04-22 ENCOUNTER — HOSPITAL ENCOUNTER (OUTPATIENT)
Dept: CARDIAC REHAB | Facility: OTHER | Age: 74
End: 2019-04-22
Attending: INTERNAL MEDICINE
Payer: MEDICARE

## 2019-04-26 ENCOUNTER — HOSPITAL ENCOUNTER (OUTPATIENT)
Dept: CARDIAC REHAB | Facility: OTHER | Age: 74
End: 2019-04-26
Attending: INTERNAL MEDICINE
Payer: MEDICARE

## 2019-04-26 PROCEDURE — 93798 PHYS/QHP OP CAR RHAB W/ECG: CPT

## 2019-04-26 PROCEDURE — 40000116 ZZH STATISTIC OP CR VISIT

## 2019-04-29 ENCOUNTER — HOSPITAL ENCOUNTER (OUTPATIENT)
Dept: CARDIAC REHAB | Facility: OTHER | Age: 74
End: 2019-04-29
Attending: INTERNAL MEDICINE
Payer: MEDICARE

## 2019-04-29 PROCEDURE — 40000116 ZZH STATISTIC OP CR VISIT

## 2019-04-29 PROCEDURE — 93798 PHYS/QHP OP CAR RHAB W/ECG: CPT

## 2019-05-01 ENCOUNTER — HOSPITAL ENCOUNTER (OUTPATIENT)
Dept: CARDIAC REHAB | Facility: OTHER | Age: 74
End: 2019-05-01
Attending: INTERNAL MEDICINE
Payer: MEDICARE

## 2019-05-01 PROCEDURE — 40000116 ZZH STATISTIC OP CR VISIT

## 2019-05-01 PROCEDURE — 93798 PHYS/QHP OP CAR RHAB W/ECG: CPT

## 2019-05-01 PROCEDURE — 40000575 ZZH STATISTIC OP CARDIAC VISIT #2

## 2019-05-06 ENCOUNTER — HOSPITAL ENCOUNTER (OUTPATIENT)
Dept: CARDIAC REHAB | Facility: OTHER | Age: 74
End: 2019-05-06
Attending: INTERNAL MEDICINE
Payer: MEDICARE

## 2019-05-06 PROCEDURE — 40000116 ZZH STATISTIC OP CR VISIT

## 2019-05-06 PROCEDURE — 93798 PHYS/QHP OP CAR RHAB W/ECG: CPT

## 2019-05-08 ENCOUNTER — HOSPITAL ENCOUNTER (OUTPATIENT)
Dept: CARDIAC REHAB | Facility: OTHER | Age: 74
End: 2019-05-08
Attending: INTERNAL MEDICINE
Payer: MEDICARE

## 2019-05-08 VITALS — WEIGHT: 166.7 LBS | BODY MASS INDEX: 29.53 KG/M2

## 2019-05-08 PROCEDURE — 93798 PHYS/QHP OP CAR RHAB W/ECG: CPT

## 2019-05-08 PROCEDURE — 40000116 ZZH STATISTIC OP CR VISIT

## 2019-05-08 NOTE — PROGRESS NOTES
05/08/19 1300   Session   Session 30 Day Individualized Treatment Plan   Certified through this date 06/07/19   Cardiac Rehab Assessment   Cardiac Rehab Assessment 5/8/2019 30 day evaluation Goal#1 patient is participating at exercise sessions and has increased her exercise to 45 minutes each session Goal#2Continues to monitor salt intake and read labes to avoid fluid overloa Goal#3 Is eating smaller portions and increase exercise to loose weight4/9/19 Initial cardiac rehab visit after NSTEMI on  12/6/19, and PCI 3/14/19 at [facility]. EF 30 %, and CHF problems with dyspnea developed. Patient has been active in home only, due to weather, is dyspnic with activity. He was able to walk 10 minutes, but short of breath and HR exceeding target range. Edema to lower extremities 1+, lungs clear bilaterally. Rhythm sinus. No chest pain, only knees arthritic. Goal #1 Exercise 30-45 minutes, 3 x week by 1 month.  Goal #2 Eat lower salt foods, read labels, and do daily weights, by 1 month, to prevent return to the hospital. Goal # 3 Eat smaller portions and exercise, to loose 1-2 lbs per month.    General Information   Treatment Diagnosis NSTEMI   Date of Treatment Diagnosis 12/06/19   Secondary Treatment Diagnosis Angioplasty   Significant Past CV History History of Heart Failure   Lead up symptoms dyspnea   Hospital Location [facility] Discharge Date 03/17/19   Signs and Symptoms Post Hospital Discharge None   Outpatient Cardiac Rehab Start Date 04/09/19   Primary Physician Brielle   Primary Physician Follow Up Completed   Cardiologist Dr. Miranda   Cardiologist Follow Up Scheduled   Risk Stratification High   Summary of Cath Report   Summary of Cath Report Available   Date Performed 12/09/18   Left Main 40-50      Ramus 90   OM 90   Cath Report Comments see report   Living and Work Status    Living Arrangements and Social Status house;roommate   Support System Live with an adult   Return to  Employment Retired   Occupation    Preventative Medications   CMS recommended medications Beta Blocker;Antiplatelets;Lipid Lowering   Fall Risk Screen   Fall screen completed by Cardiac Rehab   Have you fallen 2 or more times in the past year? No   Have you fallen and had an injury in the past year? No   Is patient a fall risk? No   Pain   Patient Currently in Pain Yes   Pain Location Hips   Pain Rating 2/10   Pain Description Discomfort   Pain Description Comment arthritis   Physical Assessments   Incisions WNL   Edema +1 Trace   Right Lung Sounds normal   Left Lung Sounds normal   Limitations Arthritis   Comments new to TM, standby till stable   Individualized Treatment Plan   Monitored Sessions Scheduled 36   Monitored Sessions Attended 9   Oxygen   Supplemental Oxygen needed No   Nutrition Management - Weight Management   Assessment Re-assessment   Age 74   Weight 75.6 kg (166 lb 11.2 oz)   Goal Weight 70.3 kg (155 lb)   Initial Rate Your Plate Score. Dietary tool to assess eating patterns. Scores range from 24 to 72. The higher the score the healthier the eating pattern.   (given)   Weight Management Comments has lost 7 lbs, 2 months   Nutrition Management - Lipids   Lipids Labs Not Available   Prescribed Lipid Medication Yes   Statin Intensity Moderate Intensity   Nutrition Management - Diabetes   Diabetes No   Nutrition Management Summary   Dietary Recommendations Low Fat;Low Cholesterol;Low Sodium   Stages of Change for Diet Compliance Action   Interventions Planned Attend Nutrition Education Class(es);Complete Food Diary;Educate on Weight Management Principles;Educate on Benefits of Exercise   Interventions In Progress or Completed Educated on Benefits of Exercise   Patient Goals Goal #1   Goal #1 Description Exercise 30-40 minutes 3 x week by 1 month   Goal #1 Target Date 05/08/19   Nutrition Summary Comments encouraged smaller portions, healthy choices   Nutrition Target Outcome   (low salt, weight  loss 1-2 lbs /month)   Psychosocial Management   Psychosocial Assessment Re-assessment   Is there history of clinical depression or increased risk of depression?   (unkown)   Current Level of Stress per Patient Report Mild   Current Coping Skills Has Positive Support System   Initial Patient Health Questionnaire -9 Score (PHQ-9) for depression. 5-9 Minimal symptoms, 10-14 Minor depression, 15-19 Major depression, moderately severe, > 20 Major depression, severe  4   Initial [facility] Survey score.  Quality of Life:   If total score > 25 review individual areas where patient rated a 4 or 5.  Consider patients current medical condition and what role that plays on the score.   Adjust treatment protocol to improve areas of concern.  Consider the following:  PHQ9 score, DASI, and re-assessment within the next 30 days to assist with developing treatments.  17   Stages of Change Action   Patient Goal No   Other Core Components - Hypertension   History of or Diagnosis of Hypertension No   Currently taking Anti-Hypertensives Beta blocker   Hypertension Comments BP low, 80's systolic   Other Core Components - Tobacco   History of Tobacco Use Yes   Quit Date or Planned Quit Date   (1980)   Tobacco Use Status Former (Quit > 6 mo ago)   Tobacco Habit Cigarettes   Tobacco Use per Day (average)   (4 ppd)   Years of Tobacco Use 20   Stages of Change Maintenance   Other Core Components Summary   Interventions Planned List benefits of weight management;Educate on importance of maintaining low sodium diet;None: Patient is in maintenance for smoking cessation;Educate on importance of monitoring daily weight;Educate on signs/symptoms and when to call the doctor (i.e. increased edema, dyspnea, fatigue, dizziness/lightheadedness, change in sleep patterns, chest discomfort)   Interventions In Progress or Completed Listed benefits of weight management;Educated on importance of maintaining low sodium diet;Educated on importance of  monitoring daily weight;Educated on signs/symptoms and when to call the doctor   Patient Goals Yes   Goal #1 Description Limit salty foods, do daily weight, take Lasix, note weight gain 3-5 lbs, call doctor by 1 month   Goal #1 Target Date 05/08/19   Activity/Exercise History   Activity/Exercise Assessment Re-assessment   Number of Days Currently participating in Moderate Physical Activity?   (0)   Number of Days Currently performing  Aerobic Exercise (including rehab)? 0   Current Stage of Change (Physical Activity) Action   Patient Goals Goal #1   Goal #1 Description Loose 1-2 lbs per 1 month   Goal #1 Target Date 06/07/19   Activity/Exercise Comments exercise to keep HR in target range, gradually increase work, length to symptoms dyspnea   Exercise Assessment   Resting  bpm   Exercise  bpm   Post Exercise HR 86 bpm   Resting BP 92/64   Exercise BP 96/60   Post Exercise BP 96/64   Pre SpO2 94   While Exercising SpO2 99   Post SpO2 94   Effort Rating 3   Current MET Level 2.5   MET Level Goal 4   ECG Rhythm Sinus rhythm   Ectopy None   Current Symptoms Dyspnea   Limitations/Restrictions Orthopedic (see comments)   Exercise Prescription   Mode Treadmill   Duration/Time 30-45 min   Frequency 3 daysweek   THR (85% of age predicted max HR) 125.8   OMNI Effort Rating (0-10 Scale) 4-6/10   Progression Aerobic exercise to OMNI rating of 6 or below and at or below THR   Recommended Home Exercise   Type of Exercise Walking   Frequency (days per week) 3   Duration (minutes per session) 15-30 min   Effort Rating Recommended 4-6/10   Current Home Exercise   Type of Exercise Walking   Frequency (days per week) 3   Duration (minutes per session) 30   Follow-up/On-going Support   Provider follow-up needed on the following Blood Pressure;Weight Management   Learning Assessment   Learner Patient   Primary Language English   Preferred Learning Style Listening   Barriers to Learning No barriers noted   Patient Education    Education recommended Anatomy and Physiology of the Heart;Exercise Principles;Heart Failure;Medication Overview;Nutrition;Risk Factors;Weight Loss   Education classes attended Risk Factors;Heart Failure   Physician cosignature/electronic signature indicates approval of this ITP document. I have established, reviewed and made necessary changes to the individualized treatment plan and exercise prescription for this patient.

## 2019-05-08 NOTE — PROGRESS NOTES
05/08/19 1300   Session   Session 30 Day Individualized Treatment Plan   Certified through this date 06/07/19   Cardiac Rehab Assessment   Cardiac Rehab Assessment 5/8/2019 30 day evaluation Goal#1 patient is participating at exercise sessions and has increased her exercise to 45 minutes each session Goal#2Continues to monitor salt intake and read labes to avoid fluid overloa Goal#3 Is eating smaller portions and increase exercise to loose weight4/9/19 Initial cardiac rehab visit after NSTEMI on  12/6/19, and PCI 3/14/19 at [facility]. EF 30 %, and CHF problems with dyspnea developed. Patient has been active in home only, due to weather, is dyspnic with activity. He was able to walk 10 minutes, but short of breath and HR exceeding target range. Edema to lower extremities 1+, lungs clear bilaterally. Rhythm sinus. No chest pain, only knees arthritic. Goal #1 Exercise 30-45 minutes, 3 x week by 1 month.  Goal #2 Eat lower salt foods, read labels, and do daily weights, by 1 month, to prevent return to the hospital. Goal # 3 Eat smaller portions and exercise, to loose 1-2 lbs per month.    General Information   Treatment Diagnosis NSTEMI   Date of Treatment Diagnosis 12/06/19   Secondary Treatment Diagnosis Angioplasty   Significant Past CV History History of Heart Failure   Lead up symptoms dyspnea   Hospital Location [facility] Discharge Date 03/17/19   Signs and Symptoms Post Hospital Discharge None   Outpatient Cardiac Rehab Start Date 04/09/19   Primary Physician Brielle   Primary Physician Follow Up Completed   Cardiologist Dr. Miranda   Cardiologist Follow Up Scheduled   Risk Stratification High   Summary of Cath Report   Summary of Cath Report Available   Date Performed 12/09/18   Left Main 40-50      Ramus 90   OM 90   Cath Report Comments see report   Living and Work Status    Living Arrangements and Social Status house;roommate   Support System Live with an adult   Return to  Employment Retired   Occupation    Preventative Medications   CMS recommended medications Beta Blocker;Antiplatelets;Lipid Lowering   Fall Risk Screen   Fall screen completed by Cardiac Rehab   Have you fallen 2 or more times in the past year? No   Have you fallen and had an injury in the past year? No   Is patient a fall risk? No   Pain   Patient Currently in Pain Yes   Pain Location Hips   Pain Rating 2/10   Pain Description Discomfort   Pain Description Comment arthritis   Physical Assessments   Incisions WNL   Edema +1 Trace   Right Lung Sounds normal   Left Lung Sounds normal   Limitations Arthritis   Comments new to TM, standby till stable   Individualized Treatment Plan   Monitored Sessions Scheduled 36   Monitored Sessions Attended 9   Oxygen   Supplemental Oxygen needed No   Nutrition Management - Weight Management   Assessment Re-assessment   Age 74   Weight 75.6 kg (166 lb 11.2 oz)   Goal Weight 70.3 kg (155 lb)   Initial Rate Your Plate Score. Dietary tool to assess eating patterns. Scores range from 24 to 72. The higher the score the healthier the eating pattern.   (given)   Weight Management Comments has lost 7 lbs, 2 months   Nutrition Management - Lipids   Lipids Labs Not Available   Prescribed Lipid Medication Yes   Statin Intensity Moderate Intensity   Nutrition Management - Diabetes   Diabetes No   Nutrition Management Summary   Dietary Recommendations Low Fat;Low Cholesterol;Low Sodium   Stages of Change for Diet Compliance Action   Interventions Planned Attend Nutrition Education Class(es);Complete Food Diary;Educate on Weight Management Principles;Educate on Benefits of Exercise   Interventions In Progress or Completed Educated on Benefits of Exercise   Patient Goals Goal #1   Goal #1 Description Exercise 30-40 minutes 3 x week by 1 month   Goal #1 Target Date 05/08/19   Nutrition Summary Comments encouraged smaller portions, healthy choices   Nutrition Target Outcome   (low salt, weight  loss 1-2 lbs /month)   Psychosocial Management   Psychosocial Assessment Re-assessment   Is there history of clinical depression or increased risk of depression?   (unkown)   Current Level of Stress per Patient Report Mild   Current Coping Skills Has Positive Support System   Initial Patient Health Questionnaire -9 Score (PHQ-9) for depression. 5-9 Minimal symptoms, 10-14 Minor depression, 15-19 Major depression, moderately severe, > 20 Major depression, severe  4   Initial [facility] Survey score.  Quality of Life:   If total score > 25 review individual areas where patient rated a 4 or 5.  Consider patients current medical condition and what role that plays on the score.   Adjust treatment protocol to improve areas of concern.  Consider the following:  PHQ9 score, DASI, and re-assessment within the next 30 days to assist with developing treatments.  17   Stages of Change Action   Patient Goal No   Other Core Components - Hypertension   History of or Diagnosis of Hypertension No   Currently taking Anti-Hypertensives Beta blocker   Hypertension Comments BP low, 80's systolic   Other Core Components - Tobacco   History of Tobacco Use Yes   Quit Date or Planned Quit Date   (1980)   Tobacco Use Status Former (Quit > 6 mo ago)   Tobacco Habit Cigarettes   Tobacco Use per Day (average)   (4 ppd)   Years of Tobacco Use 20   Stages of Change Maintenance   Other Core Components Summary   Interventions Planned List benefits of weight management;Educate on importance of maintaining low sodium diet;None: Patient is in maintenance for smoking cessation;Educate on importance of monitoring daily weight;Educate on signs/symptoms and when to call the doctor (i.e. increased edema, dyspnea, fatigue, dizziness/lightheadedness, change in sleep patterns, chest discomfort)   Interventions In Progress or Completed Listed benefits of weight management;Educated on importance of maintaining low sodium diet;Educated on importance of  monitoring daily weight;Educated on signs/symptoms and when to call the doctor   Patient Goals Yes   Goal #1 Description Limit salty foods, do daily weight, take Lasix, note weight gain 3-5 lbs, call doctor by 1 month   Goal #1 Target Date 05/08/19   Activity/Exercise History   Activity/Exercise Assessment Re-assessment   Number of Days Currently participating in Moderate Physical Activity?   (0)   Number of Days Currently performing  Aerobic Exercise (including rehab)? 0   Current Stage of Change (Physical Activity) Action   Patient Goals Goal #1   Goal #1 Description Loose 1-2 lbs per 1 month   Goal #1 Target Date 06/07/19   Activity/Exercise Comments exercise to keep HR in target range, gradually increase work, length to symptoms dyspnea   Exercise Assessment   Resting  bpm   Exercise  bpm   Post Exercise HR 86 bpm   Resting BP 92/64   Exercise BP 96/60   Post Exercise BP 96/64   Pre SpO2 94   While Exercising SpO2 99   Post SpO2 94   Effort Rating 3   Current MET Level 2.5   MET Level Goal 4   ECG Rhythm Sinus rhythm   Ectopy None   Current Symptoms Dyspnea   Limitations/Restrictions Orthopedic (see comments)   Exercise Prescription   Mode Treadmill   Duration/Time 30-45 min   Frequency 3 daysweek   THR (85% of age predicted max HR) 125.8   OMNI Effort Rating (0-10 Scale) 4-6/10   Progression Aerobic exercise to OMNI rating of 6 or below and at or below THR   Recommended Home Exercise   Type of Exercise Walking   Frequency (days per week) 3   Duration (minutes per session) 15-30 min   Effort Rating Recommended 4-6/10   Current Home Exercise   Type of Exercise Walking   Frequency (days per week) 3   Duration (minutes per session) 30   Follow-up/On-going Support   Provider follow-up needed on the following Blood Pressure;Weight Management   Learning Assessment   Learner Patient   Primary Language English   Preferred Learning Style Listening   Barriers to Learning No barriers noted   Patient Education    Education recommended Anatomy and Physiology of the Heart;Exercise Principles;Heart Failure;Medication Overview;Nutrition;Risk Factors;Weight Loss   Education classes attended Risk Factors;Heart Failure   Physician cosignature/electronic signature indicates approval of this ITP document. I have established, reviewed and made necessary changes to the individualized treatment plan and exercise prescription for this patient.

## 2019-05-15 ENCOUNTER — HOSPITAL ENCOUNTER (OUTPATIENT)
Dept: CARDIAC REHAB | Facility: OTHER | Age: 74
End: 2019-05-15
Attending: INTERNAL MEDICINE
Payer: MEDICARE

## 2019-05-15 PROCEDURE — 93798 PHYS/QHP OP CAR RHAB W/ECG: CPT

## 2019-05-15 PROCEDURE — 40000116 ZZH STATISTIC OP CR VISIT

## 2019-05-17 ENCOUNTER — HOSPITAL ENCOUNTER (OUTPATIENT)
Dept: CARDIAC REHAB | Facility: OTHER | Age: 74
End: 2019-05-17
Attending: INTERNAL MEDICINE
Payer: MEDICARE

## 2019-05-17 PROCEDURE — 40000116 ZZH STATISTIC OP CR VISIT

## 2019-05-17 PROCEDURE — 93798 PHYS/QHP OP CAR RHAB W/ECG: CPT

## 2019-05-20 ENCOUNTER — HOSPITAL ENCOUNTER (OUTPATIENT)
Dept: CARDIAC REHAB | Facility: OTHER | Age: 74
End: 2019-05-20
Attending: INTERNAL MEDICINE
Payer: MEDICARE

## 2019-05-20 PROCEDURE — 93798 PHYS/QHP OP CAR RHAB W/ECG: CPT

## 2019-05-20 PROCEDURE — 40000116 ZZH STATISTIC OP CR VISIT

## 2019-05-22 ENCOUNTER — HOSPITAL ENCOUNTER (OUTPATIENT)
Dept: CARDIAC REHAB | Facility: OTHER | Age: 74
End: 2019-05-22
Attending: INTERNAL MEDICINE
Payer: MEDICARE

## 2019-05-22 PROCEDURE — 40000116 ZZH STATISTIC OP CR VISIT

## 2019-05-22 PROCEDURE — 40000575 ZZH STATISTIC OP CARDIAC VISIT #2

## 2019-05-22 NOTE — PROGRESS NOTES
Pt arrived for cardiac rehab.  Attended Nutrition 2 education class with Dietitian.  Informed us that his knees were bothering him (7/10 pain) and did not want to exercise today.  No charge for visit.

## 2019-05-24 ENCOUNTER — HOSPITAL ENCOUNTER (OUTPATIENT)
Dept: CARDIAC REHAB | Facility: OTHER | Age: 74
End: 2019-05-24
Attending: INTERNAL MEDICINE
Payer: MEDICARE

## 2019-05-24 PROCEDURE — 40000116 ZZH STATISTIC OP CR VISIT

## 2019-05-24 PROCEDURE — 93798 PHYS/QHP OP CAR RHAB W/ECG: CPT

## 2019-06-03 ENCOUNTER — HOSPITAL ENCOUNTER (OUTPATIENT)
Dept: CARDIAC REHAB | Facility: OTHER | Age: 74
End: 2019-06-03
Attending: INTERNAL MEDICINE
Payer: MEDICARE

## 2019-06-03 PROCEDURE — 93798 PHYS/QHP OP CAR RHAB W/ECG: CPT

## 2019-06-03 PROCEDURE — 40000116 ZZH STATISTIC OP CR VISIT

## 2019-06-07 ENCOUNTER — HOSPITAL ENCOUNTER (OUTPATIENT)
Dept: CARDIAC REHAB | Facility: OTHER | Age: 74
End: 2019-06-07
Attending: INTERNAL MEDICINE
Payer: MEDICARE

## 2019-06-07 VITALS — WEIGHT: 161 LBS | HEIGHT: 63 IN | BODY MASS INDEX: 28.53 KG/M2

## 2019-06-07 PROCEDURE — 40000116 ZZH STATISTIC OP CR VISIT

## 2019-06-07 PROCEDURE — 93798 PHYS/QHP OP CAR RHAB W/ECG: CPT

## 2019-06-07 ASSESSMENT — 6 MINUTE WALK TEST (6MWT)
MALE CALC: 1351.22
PREDICTED: 1359.46
FEMALE CALC: 1380.4

## 2019-06-07 ASSESSMENT — MIFFLIN-ST. JEOR: SCORE: 1375.29

## 2019-06-10 ENCOUNTER — HOSPITAL ENCOUNTER (OUTPATIENT)
Dept: CARDIAC REHAB | Facility: OTHER | Age: 74
End: 2019-06-10
Attending: INTERNAL MEDICINE
Payer: MEDICARE

## 2019-06-10 PROCEDURE — 40000116 ZZH STATISTIC OP CR VISIT

## 2019-06-10 PROCEDURE — 93798 PHYS/QHP OP CAR RHAB W/ECG: CPT

## 2019-06-12 ENCOUNTER — HOSPITAL ENCOUNTER (OUTPATIENT)
Dept: CARDIAC REHAB | Facility: OTHER | Age: 74
End: 2019-06-12
Attending: INTERNAL MEDICINE
Payer: MEDICARE

## 2019-06-12 PROCEDURE — 40000116 ZZH STATISTIC OP CR VISIT

## 2019-06-12 PROCEDURE — 40000575 ZZH STATISTIC OP CARDIAC VISIT #2

## 2019-06-12 NOTE — PROGRESS NOTES
Pt arrived for cardiac rehab today and stated he did not want to exercise due to stiff back.  He did attend medication class with pharmacist.  No exercise, no charge for visit.

## 2019-06-14 NOTE — PROGRESS NOTES
Cardiac Rehab Discharge Summary    Reason for discharge:    Pt presented today stating that he no longer wants to attend cardiac rehab due to knee pain.  He thinks the exercise is making it worse.  [facility].      Progress towards goals:  Goals partially met.  Barriers to achieving goals:   knee pain.    Recommendation(s):    Continue home exercise program.

## 2019-09-24 ENCOUNTER — TRANSFERRED RECORDS (OUTPATIENT)
Dept: HEALTH INFORMATION MANAGEMENT | Facility: OTHER | Age: 74
End: 2019-09-24

## 2020-01-15 ENCOUNTER — TRANSFERRED RECORDS (OUTPATIENT)
Dept: HEALTH INFORMATION MANAGEMENT | Facility: OTHER | Age: 75
End: 2020-01-15

## 2020-04-16 ENCOUNTER — TRANSFERRED RECORDS (OUTPATIENT)
Dept: HEALTH INFORMATION MANAGEMENT | Facility: OTHER | Age: 75
End: 2020-04-16

## 2020-11-17 ENCOUNTER — TRANSFERRED RECORDS (OUTPATIENT)
Dept: HEALTH INFORMATION MANAGEMENT | Facility: OTHER | Age: 75
End: 2020-11-17

## 2021-06-01 ENCOUNTER — TRANSFERRED RECORDS (OUTPATIENT)
Dept: HEALTH INFORMATION MANAGEMENT | Facility: OTHER | Age: 76
End: 2021-06-01

## 2021-10-05 NOTE — ADDENDUM NOTE
[facility]  [address] [place] [address], [address], [address] [facility]  [address] [facility], [address] [facility]  [place], [address]   [phone]  Nereida Jesus DO      Postoperative Office Note  Patient ID:  Name: Mary Ellen Mistry  MRM: [id number]  : 1945/71 y.o. Date: 7/3/2017    SUBJECTIVE:    This is a 73 y.o.  female who presents one week following Total laparoscopic hysterectomy, bilateral salpingo-oophorectomy, lysis of adhesions. Currently she has abdominal distention, constipation, redness around incisions. Appetite is good but slightly decreased. Eating a regular diet without difficulty. Denies nausea. Urinating without difficulty. Bowel movements are constipated, taking stool softener once daily, laxative PRN. LBM . Patient is passing flatus. Pain is controlled with current analgesics. Medication(s) being used: Dilaudid. Her pathology revealed      TOTAL HYSTERECTOMY / BILATERAL SALPINGO-OOPHORECTOMY:   CERVIX: NO ABNORMALITY SEEN.   ENDOMETRIUM: FOCAL COMPLEX HYPERPLASIA WITH ATYPIA. MYOMETRIUM: NO ABNORMALITY SEEN. SEROSA: ENDOMETRIOSIS. BILATERAL TUBES: NO ABNORMALITY SEEN. RIGHT OVARY: BENIGN CORTICAL CYSTS. LEFT OVARY: ENDOMETRIOSIS. Washings, Pelvic   Satisfactory for evaluation. No malignant cells found. Medications:     Current Outpatient Prescriptions on File Prior to Visit   Medication Sig Dispense Refill    HYDROmorphone (DILAUDID) 2 mg tablet Take 1-2 Tabs by mouth every three (3) hours as needed. Max Daily Amount: 32 mg. Indications: Pain 60 Tab 0    POTASSIUM PO Take  by mouth daily.  b complex-vitamin c-folic acid (NEPHROCAPS) 1 mg capsule Take 1 Cap by mouth daily.  famotidine (PEPCID) 20 mg tablet Take 20 mg by mouth as needed.  montelukast (SINGULAIR) 10 mg tablet Take 10 mg by mouth daily as needed.       ERGOCALCIFEROL, VITAMIN D2, (VITAMIN D2 PO) Take 1 Tab Encounter addended by: Leatha Fisher EP on: 6/14/2019 1:17 PM   Actions taken: Sign clinical note, Episode resolved by mouth daily.  amLODIPine (NORVASC) 2.5 mg tablet Take 2.5 mg by mouth nightly.  aspirin delayed-release 81 mg tablet Take  by mouth daily.  butalbital-aspirin-caffeine (FIORINAL) capsule Take 1 Cap by mouth every four (4) hours as needed for Pain.  multivitamin (ONE A DAY) tablet Take 1 Tab by mouth daily.  Cetirizine 10 mg cap Take 10 mg by mouth daily.  valACYclovir (VALTREX) 500 mg tablet Take 500 mg by mouth two (2) times a day.  levocetirizine (XYZAL) 5 mg tablet Take 5 mg by mouth daily as needed for Allergies.  Dexlansoprazole (DEXILANT) 30 mg CpDM Take 30 mg by mouth daily.  dicyclomine (BENTYL) 20 mg tablet Take 20 mg by mouth two (2) times a day.  eszopiclone (LUNESTA) 2 mg tablet Take 2 mg by mouth nightly.  clonazePAM (KLONOPIN) 1 mg tablet Take 1 mg by mouth daily. Indications: PANIC DISORDER      simvastatin (ZOCOR) 40 mg tablet Take 40 mg by mouth nightly. No current facility-administered medications on file prior to visit. Allergies:      Allergies   Allergen Reactions    Acetaminophen Unable to Obtain    Amoxicillin Hives    Aspirin Hives    Cephalexin Hives and Nausea and Vomiting    Ciprofloxacin Other (comments)     GI distress    Clavulanic Acid Unknown (comments)    Codeine Hives    Doxycycline Hives    Eryc [Erythromycin] Other (comments)     Gi ditress    Formaldehyde Unknown (comments)    Gold Sodium Thiomalate Unknown (comments)    Hydrocodone Unknown (comments)    Hydrocodone-Acetaminophen Rash    Meperidine Hives    Neomycin Other (comments)    Nsaids (Non-Steroidal Anti-Inflammatory Drug) Unknown (comments)    Oxycodone Unknown (comments)    Penicillin G Hives    Penicillin G Potassium In D5w Unknown (comments)    Percocet [Oxycodone-Acetaminophen] Other (comments)     GI    Potassium Unknown (comments)    Prolia [Denosumab] Hives    Sulfa (Sulfonamide Antibiotics) Hives    Sulfanilamide Unknown (comments)       OBJECTIVE:    Vitals:   Visit Vitals    /74 (BP 1 Location: Right arm, BP Patient Position: Sitting)    Pulse 100    Temp 99 °F (37.2 °C) (Oral)    Resp 18    Ht 5' 5\" (1.651 m)    Wt 66 kg (145 lb 6.4 oz)    SpO2 99%    BMI 24.2 kg/m2       Physical Examination:    General:  alert, cooperative, no distress   Abdomen: distended, bowel sounds active, non-tender   Incision: Incisions healing well, no drainage, erythema to incision sites. Well demarcated erythema, raw skin to sites most c/w adhesive irritation, no drainage or surrounding erythema. Pelvic: deferred   Rectal: not done   Extremity:   extremities normal, atraumatic, no cyanosis or edema     IMPRESSION/PLAN:    Kvng Samuel's postoperative course complicated by adhesive tape reaction. She has a working diagnosis of CAH, endometriosis. The operative procedures and clinical results have been reviewed with the patient. Steri strips removed, recommend antibiotic ointment to site of adhesive tape irritation. No signs/symptoms of infection to incision sites. Reviewed s/s of infection, pt to call office PRN new or worsening redness, fever, drainage or other symptoms. Constipation. Reviewed bowel regimen recommendations including schedule stool softener use while on narcotic pain medication, as well as laxative PRN. I will see the patient back for scheduled 4 week post-op appointment. The patient is advised to call our office with any problems or concerns.     Clarita Anderson [facility]  Gynecologic Oncology  7/3/2017/9:40 AM 15-21 days ([facility])

## 2022-07-23 ENCOUNTER — HOSPITAL ENCOUNTER (INPATIENT)
Facility: OTHER | Age: 77
LOS: 3 days | Discharge: HOME HEALTH | DRG: 292 | End: 2022-07-27
Attending: EMERGENCY MEDICINE | Admitting: INTERNAL MEDICINE
Payer: MEDICARE

## 2022-07-23 DIAGNOSIS — I25.10 CORONARY ARTERY DISEASE INVOLVING NATIVE CORONARY ARTERY OF NATIVE HEART WITHOUT ANGINA PECTORIS: ICD-10-CM

## 2022-07-23 DIAGNOSIS — I50.9 ACUTE ON CHRONIC CONGESTIVE HEART FAILURE, UNSPECIFIED HEART FAILURE TYPE (H): ICD-10-CM

## 2022-07-23 DIAGNOSIS — R60.0 BILATERAL LOWER EXTREMITY EDEMA: ICD-10-CM

## 2022-07-23 DIAGNOSIS — I50.23 ACUTE ON CHRONIC SYSTOLIC CONGESTIVE HEART FAILURE (H): Primary | ICD-10-CM

## 2022-07-23 DIAGNOSIS — N50.89 SCROTAL SWELLING: ICD-10-CM

## 2022-07-23 PROCEDURE — 99285 EMERGENCY DEPT VISIT HI MDM: CPT | Performed by: EMERGENCY MEDICINE

## 2022-07-23 PROCEDURE — 93005 ELECTROCARDIOGRAM TRACING: CPT | Performed by: EMERGENCY MEDICINE

## 2022-07-23 PROCEDURE — 99285 EMERGENCY DEPT VISIT HI MDM: CPT | Mod: 25 | Performed by: EMERGENCY MEDICINE

## 2022-07-23 RX ORDER — FUROSEMIDE 10 MG/ML
40 INJECTION INTRAMUSCULAR; INTRAVENOUS ONCE
Status: COMPLETED | OUTPATIENT
Start: 2022-07-23 | End: 2022-07-24

## 2022-07-24 ENCOUNTER — APPOINTMENT (OUTPATIENT)
Dept: PHYSICAL THERAPY | Facility: OTHER | Age: 77
DRG: 292 | End: 2022-07-24
Attending: INTERNAL MEDICINE
Payer: MEDICARE

## 2022-07-24 ENCOUNTER — APPOINTMENT (OUTPATIENT)
Dept: OCCUPATIONAL THERAPY | Facility: OTHER | Age: 77
DRG: 292 | End: 2022-07-24
Attending: INTERNAL MEDICINE
Payer: MEDICARE

## 2022-07-24 PROBLEM — I50.9 ACUTE ON CHRONIC CONGESTIVE HEART FAILURE, UNSPECIFIED HEART FAILURE TYPE (H): Status: ACTIVE | Noted: 2022-07-24

## 2022-07-24 PROBLEM — I50.9 CHF EXACERBATION (H): Status: ACTIVE | Noted: 2022-07-24

## 2022-07-24 PROBLEM — N50.89 SCROTAL SWELLING: Status: ACTIVE | Noted: 2022-07-24

## 2022-07-24 PROBLEM — N17.9 ACUTE KIDNEY INJURY (H): Status: ACTIVE | Noted: 2022-07-24

## 2022-07-24 PROBLEM — R60.0 BILATERAL LOWER EXTREMITY EDEMA: Status: ACTIVE | Noted: 2022-07-24

## 2022-07-24 LAB
ANION GAP SERPL CALCULATED.3IONS-SCNC: 6 MMOL/L (ref 3–14)
BASOPHILS # BLD AUTO: 0.1 10E3/UL (ref 0–0.2)
BASOPHILS NFR BLD AUTO: 1 %
BUN SERPL-MCNC: 26 MG/DL (ref 7–25)
CALCIUM SERPL-MCNC: 9.1 MG/DL (ref 8.6–10.3)
CHLORIDE BLD-SCNC: 100 MMOL/L (ref 98–107)
CO2 SERPL-SCNC: 29 MMOL/L (ref 21–31)
CREAT SERPL-MCNC: 1.51 MG/DL (ref 0.7–1.3)
CREAT SERPL-MCNC: 1.59 MG/DL (ref 0.7–1.3)
EOSINOPHIL # BLD AUTO: 0.3 10E3/UL (ref 0–0.7)
EOSINOPHIL NFR BLD AUTO: 4 %
ERYTHROCYTE [DISTWIDTH] IN BLOOD BY AUTOMATED COUNT: 20 % (ref 10–15)
FLUAV RNA SPEC QL NAA+PROBE: NEGATIVE
FLUBV RNA RESP QL NAA+PROBE: NEGATIVE
GFR SERPL CREATININE-BSD FRML MDRD: 45 ML/MIN/1.73M2
GFR SERPL CREATININE-BSD FRML MDRD: 48 ML/MIN/1.73M2
GLUCOSE BLD-MCNC: 100 MG/DL (ref 70–105)
HCT VFR BLD AUTO: 38.5 % (ref 40–53)
HGB BLD-MCNC: 12.1 G/DL (ref 13.3–17.7)
IMM GRANULOCYTES # BLD: 0 10E3/UL
IMM GRANULOCYTES NFR BLD: 0 %
LYMPHOCYTES # BLD AUTO: 0.4 10E3/UL (ref 0.8–5.3)
LYMPHOCYTES NFR BLD AUTO: 5 %
MCH RBC QN AUTO: 27.2 PG (ref 26.5–33)
MCHC RBC AUTO-ENTMCNC: 31.4 G/DL (ref 31.5–36.5)
MCV RBC AUTO: 87 FL (ref 78–100)
MONOCYTES # BLD AUTO: 1 10E3/UL (ref 0–1.3)
MONOCYTES NFR BLD AUTO: 13 %
NEUTROPHILS # BLD AUTO: 5.7 10E3/UL (ref 1.6–8.3)
NEUTROPHILS NFR BLD AUTO: 77 %
NRBC # BLD AUTO: 0 10E3/UL
NRBC BLD AUTO-RTO: 0 /100
NT-PROBNP SERPL-MCNC: 1836 PG/ML (ref 0–100)
PLATELET # BLD AUTO: 185 10E3/UL (ref 150–450)
POTASSIUM BLD-SCNC: 3.9 MMOL/L (ref 3.5–5.1)
RBC # BLD AUTO: 4.45 10E6/UL (ref 4.4–5.9)
RSV RNA SPEC NAA+PROBE: NEGATIVE
SARS-COV-2 RNA RESP QL NAA+PROBE: NEGATIVE
SODIUM SERPL-SCNC: 135 MMOL/L (ref 134–144)
TROPONIN I SERPL-MCNC: 9.4 PG/ML (ref 0–34)
WBC # BLD AUTO: 7.5 10E3/UL (ref 4–11)

## 2022-07-24 PROCEDURE — 97530 THERAPEUTIC ACTIVITIES: CPT | Mod: GP | Performed by: PHYSICAL THERAPIST

## 2022-07-24 PROCEDURE — 82310 ASSAY OF CALCIUM: CPT | Performed by: EMERGENCY MEDICINE

## 2022-07-24 PROCEDURE — 97530 THERAPEUTIC ACTIVITIES: CPT | Mod: GO

## 2022-07-24 PROCEDURE — 97161 PT EVAL LOW COMPLEX 20 MIN: CPT | Mod: GP | Performed by: PHYSICAL THERAPIST

## 2022-07-24 PROCEDURE — 120N000001 HC R&B MED SURG/OB

## 2022-07-24 PROCEDURE — 96374 THER/PROPH/DIAG INJ IV PUSH: CPT | Performed by: EMERGENCY MEDICINE

## 2022-07-24 PROCEDURE — 84484 ASSAY OF TROPONIN QUANT: CPT | Performed by: EMERGENCY MEDICINE

## 2022-07-24 PROCEDURE — 36415 COLL VENOUS BLD VENIPUNCTURE: CPT | Performed by: INTERNAL MEDICINE

## 2022-07-24 PROCEDURE — 97535 SELF CARE MNGMENT TRAINING: CPT | Mod: GO

## 2022-07-24 PROCEDURE — 83880 ASSAY OF NATRIURETIC PEPTIDE: CPT | Performed by: EMERGENCY MEDICINE

## 2022-07-24 PROCEDURE — 82565 ASSAY OF CREATININE: CPT | Performed by: INTERNAL MEDICINE

## 2022-07-24 PROCEDURE — 250N000013 HC RX MED GY IP 250 OP 250 PS 637: Performed by: INTERNAL MEDICINE

## 2022-07-24 PROCEDURE — 250N000011 HC RX IP 250 OP 636: Performed by: INTERNAL MEDICINE

## 2022-07-24 PROCEDURE — 93010 ELECTROCARDIOGRAM REPORT: CPT | Performed by: INTERNAL MEDICINE

## 2022-07-24 PROCEDURE — 85025 COMPLETE CBC W/AUTO DIFF WBC: CPT | Performed by: EMERGENCY MEDICINE

## 2022-07-24 PROCEDURE — 36415 COLL VENOUS BLD VENIPUNCTURE: CPT | Performed by: EMERGENCY MEDICINE

## 2022-07-24 PROCEDURE — 87637 SARSCOV2&INF A&B&RSV AMP PRB: CPT | Performed by: EMERGENCY MEDICINE

## 2022-07-24 PROCEDURE — G0378 HOSPITAL OBSERVATION PER HR: HCPCS

## 2022-07-24 PROCEDURE — 250N000011 HC RX IP 250 OP 636: Performed by: EMERGENCY MEDICINE

## 2022-07-24 PROCEDURE — C9803 HOPD COVID-19 SPEC COLLECT: HCPCS | Performed by: EMERGENCY MEDICINE

## 2022-07-24 PROCEDURE — 97165 OT EVAL LOW COMPLEX 30 MIN: CPT | Mod: GO

## 2022-07-24 PROCEDURE — 96372 THER/PROPH/DIAG INJ SC/IM: CPT | Performed by: INTERNAL MEDICINE

## 2022-07-24 PROCEDURE — 99222 1ST HOSP IP/OBS MODERATE 55: CPT | Performed by: INTERNAL MEDICINE

## 2022-07-24 RX ORDER — ONDANSETRON 2 MG/ML
4 INJECTION INTRAMUSCULAR; INTRAVENOUS EVERY 6 HOURS PRN
Status: DISCONTINUED | OUTPATIENT
Start: 2022-07-24 | End: 2022-07-27 | Stop reason: HOSPADM

## 2022-07-24 RX ORDER — FUROSEMIDE 10 MG/ML
60 INJECTION INTRAMUSCULAR; INTRAVENOUS EVERY 8 HOURS
Status: DISCONTINUED | OUTPATIENT
Start: 2022-07-24 | End: 2022-07-27 | Stop reason: HOSPADM

## 2022-07-24 RX ORDER — NALOXONE HYDROCHLORIDE 0.4 MG/ML
0.2 INJECTION, SOLUTION INTRAMUSCULAR; INTRAVENOUS; SUBCUTANEOUS
Status: DISCONTINUED | OUTPATIENT
Start: 2022-07-24 | End: 2022-07-27 | Stop reason: HOSPADM

## 2022-07-24 RX ORDER — CLOPIDOGREL BISULFATE 75 MG/1
75 TABLET ORAL DAILY
Status: DISCONTINUED | OUTPATIENT
Start: 2022-07-24 | End: 2022-07-27 | Stop reason: HOSPADM

## 2022-07-24 RX ORDER — ACETAMINOPHEN 325 MG/1
650 TABLET ORAL EVERY 6 HOURS PRN
Status: DISCONTINUED | OUTPATIENT
Start: 2022-07-24 | End: 2022-07-27 | Stop reason: HOSPADM

## 2022-07-24 RX ORDER — ASPIRIN 81 MG/1
81 TABLET, CHEWABLE ORAL DAILY
Status: DISCONTINUED | OUTPATIENT
Start: 2022-07-24 | End: 2022-07-27 | Stop reason: HOSPADM

## 2022-07-24 RX ORDER — NALOXONE HYDROCHLORIDE 0.4 MG/ML
0.4 INJECTION, SOLUTION INTRAMUSCULAR; INTRAVENOUS; SUBCUTANEOUS
Status: DISCONTINUED | OUTPATIENT
Start: 2022-07-24 | End: 2022-07-27 | Stop reason: HOSPADM

## 2022-07-24 RX ORDER — ONDANSETRON 4 MG/1
4 TABLET, ORALLY DISINTEGRATING ORAL EVERY 6 HOURS PRN
Status: DISCONTINUED | OUTPATIENT
Start: 2022-07-24 | End: 2022-07-27 | Stop reason: HOSPADM

## 2022-07-24 RX ORDER — ATORVASTATIN CALCIUM 40 MG/1
40 TABLET, FILM COATED ORAL AT BEDTIME
Status: DISCONTINUED | OUTPATIENT
Start: 2022-07-24 | End: 2022-07-27 | Stop reason: HOSPADM

## 2022-07-24 RX ORDER — ACETAMINOPHEN 650 MG/1
650 SUPPOSITORY RECTAL EVERY 6 HOURS PRN
Status: DISCONTINUED | OUTPATIENT
Start: 2022-07-24 | End: 2022-07-27 | Stop reason: HOSPADM

## 2022-07-24 RX ORDER — METOPROLOL SUCCINATE 100 MG/1
100 TABLET, EXTENDED RELEASE ORAL 2 TIMES DAILY
Status: DISCONTINUED | OUTPATIENT
Start: 2022-07-24 | End: 2022-07-24

## 2022-07-24 RX ORDER — LIDOCAINE 40 MG/G
CREAM TOPICAL
Status: DISCONTINUED | OUTPATIENT
Start: 2022-07-24 | End: 2022-07-27 | Stop reason: HOSPADM

## 2022-07-24 RX ORDER — TRAMADOL HYDROCHLORIDE 50 MG/1
50 TABLET ORAL EVERY 6 HOURS PRN
Status: DISCONTINUED | OUTPATIENT
Start: 2022-07-24 | End: 2022-07-27 | Stop reason: HOSPADM

## 2022-07-24 RX ORDER — AMOXICILLIN 250 MG
1 CAPSULE ORAL 2 TIMES DAILY PRN
Status: DISCONTINUED | OUTPATIENT
Start: 2022-07-24 | End: 2022-07-27 | Stop reason: HOSPADM

## 2022-07-24 RX ORDER — ENOXAPARIN SODIUM 100 MG/ML
40 INJECTION SUBCUTANEOUS EVERY 24 HOURS
Status: DISCONTINUED | OUTPATIENT
Start: 2022-07-24 | End: 2022-07-27 | Stop reason: HOSPADM

## 2022-07-24 RX ORDER — HYDRALAZINE HYDROCHLORIDE 20 MG/ML
10 INJECTION INTRAMUSCULAR; INTRAVENOUS EVERY 4 HOURS PRN
Status: DISCONTINUED | OUTPATIENT
Start: 2022-07-24 | End: 2022-07-27 | Stop reason: HOSPADM

## 2022-07-24 RX ORDER — METOPROLOL SUCCINATE 100 MG/1
100 TABLET, EXTENDED RELEASE ORAL 2 TIMES DAILY
Status: ON HOLD | COMMUNITY
End: 2022-07-27

## 2022-07-24 RX ORDER — AMOXICILLIN 250 MG
2 CAPSULE ORAL 2 TIMES DAILY PRN
Status: DISCONTINUED | OUTPATIENT
Start: 2022-07-24 | End: 2022-07-27 | Stop reason: HOSPADM

## 2022-07-24 RX ORDER — LATANOPROST 50 UG/ML
1 SOLUTION/ DROPS OPHTHALMIC AT BEDTIME
Status: DISCONTINUED | OUTPATIENT
Start: 2022-07-24 | End: 2022-07-24

## 2022-07-24 RX ORDER — LOSARTAN POTASSIUM 25 MG/1
25 TABLET ORAL AT BEDTIME
COMMUNITY
End: 2022-07-27

## 2022-07-24 RX ADMIN — METOPROLOL SUCCINATE 100 MG: 100 TABLET, EXTENDED RELEASE ORAL at 11:56

## 2022-07-24 RX ADMIN — ENOXAPARIN SODIUM 40 MG: 40 INJECTION SUBCUTANEOUS at 10:15

## 2022-07-24 RX ADMIN — ATORVASTATIN CALCIUM 40 MG: 40 TABLET, FILM COATED ORAL at 22:04

## 2022-07-24 RX ADMIN — FUROSEMIDE 40 MG: 10 INJECTION, SOLUTION INTRAVENOUS at 00:49

## 2022-07-24 RX ADMIN — FUROSEMIDE 60 MG: 10 INJECTION, SOLUTION INTRAVENOUS at 10:15

## 2022-07-24 RX ADMIN — ACETAMINOPHEN 650 MG: 325 TABLET, FILM COATED ORAL at 13:38

## 2022-07-24 RX ADMIN — CLOPIDOGREL BISULFATE 75 MG: 75 TABLET, FILM COATED ORAL at 11:46

## 2022-07-24 RX ADMIN — ASPIRIN 81 MG 81 MG: 81 TABLET ORAL at 11:47

## 2022-07-24 RX ADMIN — FUROSEMIDE 60 MG: 10 INJECTION, SOLUTION INTRAVENOUS at 18:56

## 2022-07-24 RX ADMIN — TRAMADOL HYDROCHLORIDE 50 MG: 50 TABLET, COATED ORAL at 15:35

## 2022-07-24 ASSESSMENT — ACTIVITIES OF DAILY LIVING (ADL)
PREVIOUS_RESPONSIBILITIES: MEDICATION MANAGEMENT;DRIVING;MEAL PREP
HEARING_DIFFICULTY_OR_DEAF: YES
CONCENTRATING,_REMEMBERING_OR_MAKING_DECISIONS_DIFFICULTY: NO
FALL_HISTORY_WITHIN_LAST_SIX_MONTHS: NO
DIFFICULTY_COMMUNICATING: NO
ADLS_ACUITY_SCORE: 26
DESCRIBE_HEARING_LOSS: BILATERAL HEARING LOSS
USE_OF_HEARING_ASSISTIVE_DEVICES: RIGHT HEARING AID
VISION_MANAGEMENT: GLASSES
WERE_AUXILIARY_AIDS_OFFERED?: NO
WALKING_OR_CLIMBING_STAIRS_DIFFICULTY: NO
DIFFICULTY_EATING/SWALLOWING: NO
DRESSING/BATHING_DIFFICULTY: NO
TOILETING_ISSUES: NO
DOING_ERRANDS_INDEPENDENTLY_DIFFICULTY: YES
CHANGE_IN_FUNCTIONAL_STATUS_SINCE_ONSET_OF_CURRENT_ILLNESS/INJURY: YES
WEAR_GLASSES_OR_BLIND: YES

## 2022-07-24 NOTE — PROGRESS NOTES
"   07/24/22 1251   Quick Adds   Type of Visit Initial PT Evaluation   Living Environment   People in Home   (\"landlord\" lives with patient)   Current Living Arrangements house   Number of Stairs, Main Entrance 8   Living Environment Comments Patient reports he lives in a home with 5 steps to enter and a railing. Once he's inside everything is on main level except laundry.  He hasn't been doing steps to basement and his landlord has been assisting with laundry.  He has a tub/shower combo with no bars and no shower chair.   Self-Care   Usual Activity Tolerance moderate   Current Activity Tolerance fair   Equipment Currently Used at Home none   General Information   General Observations Patient reports chronic Bilateral knee pain due to arthritis, rates 7/10.  [facility]   Integumentary/Edema   Integumentary/Edema Comments BLE edema   Range of Motion (ROM)   ROM Comment WFL for transfers and mobility   Strength (Manual Muscle Testing)   Strength Comments WFL for transfers and mobility, but limited endurance compared to baseline and increased difficulty with transfers and walking.   Bed Mobility   Comment, (Bed Mobility) SBA with significant effort required using bed rail for assist to move LE to floor.   Transfers   Comment, (Transfers) CGA x 1 and FWW for sit-stand from bed; Uses rails on wall by toilet to assist;   Gait/Stairs (Locomotion)   Comment, (Gait/Stairs) ambulation 160 ft with CGA x 1 and FWW, increased SOB; SpO2 in mid-90's throughout session;   Balance   Balance Comments Requires a FWW today   Clinical Impression   Criteria for Skilled Therapeutic Intervention Yes, treatment indicated   PT Diagnosis (PT) impaired mobility   Influenced by the following impairments impaired endurance; balance; strength;   Functional limitations due to impairments transfers, walking, dressing   Clinical Presentation (PT Evaluation Complexity) Evolving/Changing   Clinical Presentation Rationale " clinical observation   Clinical Decision Making (Complexity) low complexity   Planned Therapy Interventions (PT) gait training;strengthening;transfer training   Anticipated Equipment Needs at Discharge (PT) walker, rolling   Risk & Benefits of therapy have been explained patient   PT Discharge Planning   PT Discharge Recommendation (DC Rec) home with home care physical therapy   PT Rationale for DC Rec requires assist for self-cares and ADLs at this time   PT Brief overview of current status total assist for socks;  assist required for transfers; not functioning at baseline strength or endurnace;   Plan of Care Review   Plan of Care Reviewed With patient   Total Evaluation Time   Total Evaluation Time (Minutes) 15   Physical Therapy Goals   PT Frequency Daily   PT Predicted Duration/Target Date for Goal Attainment   (lenth of stay)   PT Goals Bed Mobility;Transfers;Gait   PT: Bed Mobility Independent   PT: Transfers Modified independent   PT: Gait Modified independent

## 2022-07-24 NOTE — PROGRESS NOTES
Called by nursing regarding asymptomatic hypotension with systolic blood pressures in the 90s.  Heart rates in the 60s.  Given need for ongoing aggressive diuresis will hold metoprolol at this point to allow for improvement in heart rate and blood pressure to aid with further diuresis.    Jermaine Gupta MD

## 2022-07-24 NOTE — PROGRESS NOTES
07/24/22 1200   Signing Clinician's Name / Credentials   Signing clinician's name / credentials Xi Cloud OTR/L   Quick Adds   Rehab Discipline OT   Therapeutic Activity   Minutes of Treatment 15 minutes   Symptoms Noted During/After Treatment Fatigue;Shortness of breath   Treatment Detail Patient completed bed mobility with CGA and bed rail.  Completed functional ambulation ~160' with FWW and CGA with some shortness of breath and fatigue upon completion.  O2 sats intially dropped to upper 80s but bounced back quickly.   ADL Training   Minutes of Treatment 10   Lower Body Dressing Training   Lower Body Dressing Training Assistance moderate assist (50% patient effort)   Lower Body Dressing Training Assistance 1 person assist   Treatment Detail Mod A to don bilateral socks.  Difficulty reaching feet d/t swelling at this time.   Toilet Training   [place] Level (Toilet Training) stand-by assist   Assistance (Toilet Training) supervision   Toilet Training Assistance - Assistive Device wall grab bar   OT Discharge Planning   OT Discharge Recommendation (DC Rec) home with home care occupational therapy   OT Rationale for DC Rec Patient would benefit from a home safety evaluation to address equipment needs.  He would also benefit from continuing therapy to address decreaed activity tolerance and independence with self-cares and functional mobility.   OT Brief overview of current status Patient participates well in PT/OT evaluation.  Requires Mod A for LB dressing, SBA for toileting and CGA with FWW for ambulation ~160'.  Patient does become short of breath with functional mobility but O2 sats remained in upper 80s and 90s.   Additional Documentation   OT Plan Continue OT   Total Session Time   Total Session Time (minutes) 45 minutes

## 2022-07-24 NOTE — PLAN OF CARE
Patient continues to have +3 dependant edema and +4 scrotal edema.  Kept elevated as much as tolerated this shift.  Patient kept on fluid restriction and low sodium diet.  Blood pressures have been soft, see previous nurses note.  Patient has voided 700 mL since being admitted to floor, one void was spilt in bed and uncharted.  Lungs are diminished in bases with R>L.  Patient has dyspnea with exertion, was able to ambulate in nicolas with PT/OT, Sp02 >90% with activity.  Lower extremities have no weeping areas, all skin intact to scrotum, some blanchable redness to groin area.      Problem: Plan of Care - These are the overarching goals to be used throughout the patient stay.    Goal: Plan of Care Review/Shift Note    Outcome: Ongoing, Progressing  Flowsheets (Taken 7/24/2022 1808)  Plan of Care Reviewed With: patient  Overall Patient Progress: no change  Goal: Absence of Hospital-Acquired Illness or Injury  Intervention: Identify and Manage Fall Risk  Recent Flowsheet Documentation  Taken 7/24/2022 1528 by Angelina Blanco RN  Safety Promotion/Fall Prevention:   activity supervised   assistive device/personal items within reach   bed alarm on   clutter free environment maintained   fall prevention program maintained   nonskid shoes/slippers when out of bed   patient and family education   safety round/check completed   supervised activity   treat reversible contributory factors   treat underlying cause  Taken 7/24/2022 0745 by Angelina Blanco, RN  Safety Promotion/Fall Prevention:   activity supervised   assistive device/personal items within reach   bed alarm on   clutter free environment maintained   fall prevention program maintained   nonskid shoes/slippers when out of bed   patient and family education   safety round/check completed   supervised activity   treat reversible contributory factors   treat underlying cause  Intervention: Prevent and Manage VTE (Venous Thromboembolism) Risk  Recent Flowsheet  Documentation  Taken 7/24/2022 1528 by Angelina Blanco RN  VTE Prevention/Management: SCDs (sequential compression devices) off  Taken 7/24/2022 0745 by Angelina Blanco RN  VTE Prevention/Management: SCDs (sequential compression devices) off  Goal: Optimal Comfort and Wellbeing  Intervention: Monitor Pain and Promote Comfort  Recent Flowsheet Documentation  Taken 7/24/2022 0733 by Angelina Blanco RN  Pain Management Interventions: declines     Problem: Heart Failure Comorbidity  Goal: Maintenance of Heart Failure Symptom Control  Intervention: Maintain Heart Failure-Management  Recent Flowsheet Documentation  Taken 7/24/2022 1528 by Angelina Blanco, RN  Medication Review/Management:   medications reviewed   high-risk medications identified  Taken 7/24/2022 0745 by Angelina Blanco RN  Medication Review/Management:   medications reviewed   high-risk medications identified   Goal Outcome Evaluation:    Plan of Care Reviewed With: patient     Overall Patient Progress: no change

## 2022-07-24 NOTE — PHARMACY-ADMISSION MEDICATION HISTORY
Pharmacy -- Admission Medication Reconciliation    Prior to admission (PTA) medications were reviewed and the patient's PTA medication list was updated.    Sources Consulted: [employer], no Care Everywhere, [facility] note, patient    The reliability of this Medication Reconciliation is: Reliability: Reliable    The following significant changes were made:  Updated last doses  Changed furosemide to 80 mg bid  Removed latanprost  Removed metoprolol succinate 25 mg daily  Added metoprolol succinate 100 mg bid  Added losartan    In addition, the patient's allergies were reviewed with the patient and left as follows:   Allergies: Patient has no known allergies.    The pharmacist has reviewed with the patient that all personal medications should be removed from the building or locked in the belongings safe.  Patient shall only take medications ordered by the physician and administered by the nursing staff.     Medication barriers identified: none noted   Medication adherence concerns: none noted   Understanding of emergency medications: n/a    Tanya [facility], 7/24/2022,  10:44 AM

## 2022-07-24 NOTE — PROVIDER NOTIFICATION
07/24/22 1747   Vital Signs   Resp 18   BP 90/57   BP Location Right arm   Oxygen Therapy   SpO2 94 %   O2 Device None (Room air)     Updated Dr. Gupta of patient's lower BP before administering lasix.  Orders to hold lasix until able to get systolic >95.  Also metoprolol was discontinued.  Will continue to monitor.

## 2022-07-24 NOTE — PROGRESS NOTES
[facility]      OUTPATIENT PHYSICAL THERAPY EVALUATION  PLAN OF TREATMENT FOR OUTPATIENT REHABILITATION  (COMPLETE FOR INITIAL CLAIMS ONLY)  Patient's Last Name, First Name, M.I.  YOB: 1945  Waqas Ferro                        Provider's Name  [facility] Medical Record No.  [id number]                               Onset Date:   7/23/22   Start of Care Date:    7/24/22     Type:     _X_PT   ___OT   ___SLP Medical Diagnosis:   CHF                        PT Diagnosis:  (P) impaired mobility   Visits from SOC:  1   _________________________________________________________________________________  Plan of Treatment/Functional Goals    Planned Interventions: (P) gait training, strengthening, transfer training     Goals: See Physical Therapy Goals on Care Plan in [employer] electronic health record.    Therapy Frequency: (P) Daily  Predicted Duration of Therapy Intervention: (P)  (lenth of stay)  _________________________________________________________________________________    I CERTIFY THE NEED FOR THESE SERVICES FURNISHED UNDER        THIS PLAN OF TREATMENT AND WHILE UNDER MY CARE     (Physician co-signature of this document indicates review and certification of the therapy plan).               ,                   Initial Assessment        See Physical Therapy evaluation dated   in Epic electronic health record.

## 2022-07-24 NOTE — ED TRIAGE NOTES
Pt arrives from home via private vehicle stating that scrotum is very swollen. States started yesterday. States more difficult to urinate. Pt does take lasix daily. Pt alert orientated and denies pain      Triage Assessment     Row Name 07/23/22 2122       Triage Assessment (Adult)    Airway WDL WDL       Respiratory WDL    Respiratory WDL WDL       Skin Circulation/Temperature WDL    Skin Circulation/Temperature WDL X  swollen groin       Cardiac WDL    Cardiac WDL WDL       Peripheral/Neurovascular WDL    Peripheral Neurovascular WDL WDL       Cognitive/Neuro/Behavioral WDL    Cognitive/Neuro/Behavioral WDL WDL

## 2022-07-24 NOTE — PROGRESS NOTES
[facility]      OUTPATIENT OCCUPATIONAL THERAPY  EVALUATION  PLAN OF TREATMENT FOR OUTPATIENT REHABILITATION  (COMPLETE FOR INITIAL CLAIMS ONLY)  Patient's Last Name, First Name, M.I.  YOB: 1945  Waqas Ferro                          Provider's Name  [facility] Medical Record No.  [id number]                               Onset Date:  07/23/22   Start of Care Date:  07/24/22     Type:     ___PT   _X_OT   ___SLP Medical Diagnosis:   CHF Excerbation                        OT Diagnosis:  Decreased independence with self-cares and functional mobility   Visits from SOC:  1   _________________________________________________________________________________  Plan of Treatment/Functional Goals    Planned Interventions: ADL retraining, transfer training, bed mobility training   Goals: See Occupational Therapy Goals on Care Plan in [employer] electronic health record.    Therapy Frequency: Daily  Predicted Duration of Therapy Intervention: 07/28/22  _________________________________________________________________________________    I CERTIFY THE NEED FOR THESE SERVICES FURNISHED UNDER        THIS PLAN OF TREATMENT AND WHILE UNDER MY CARE     (Physician co-signature of this document indicates review and certification of the therapy plan).              Certification date from: 07/24/22, Certification date to: 08/07/22    Referring Physician: Dr. Morales            Initial Assessment        See Occupational Therapy evaluation dated 07/24/22 in Epic electronic health record.

## 2022-07-24 NOTE — PLAN OF CARE
"Patient admitted for CHF exacerbation, has +3 pitting edema to bilateral lower extremities from his waist down. Patient has significant edema to scrotal area. Vitally stable, remains on room air. Vital signs:  Temp: 98.2  F (36.8  C) Temp src: Tympanic BP: 108/75 Pulse: 69   Resp: 22 SpO2: 95 % O2 Device: None (Room air)   Height: 160 cm (5' 2.99\") Weight: 86.2 kg (190 lb)  Estimated body mass index is 33.67 kg/m  as calculated from the following:    Height as of this encounter: 1.6 m (5' 2.99\").    Weight as of this encounter: 86.2 kg (190 lb).  Patient was given Lasix in ED has not voided since arrival to [facility]. Call light within reach and instructed to call when up to void.    Problem: Plan of Care - These are the overarching goals to be used throughout the patient stay.    Goal: Plan of Care Review/Shift Note  Description: The Plan of Care Review/Shift note should be completed every shift.  The Outcome Evaluation is a brief statement about your assessment that the patient is improving, declining, or no change.  This information will be displayed automatically on your shift note.  Outcome: Ongoing, Not Progressing  Flowsheets (Taken 7/24/2022 0300)  Plan of Care Reviewed With: patient  Overall Patient Progress: no change  Goal: Patient-Specific Goal (Individualized)  Description: You can add care plan individualizations to a care plan. Examples of Individualization might be:  \"Parent requests to be called daily at 9am for status\", \"I have a hard time hearing out of my right ear\", or \"Do not touch me to wake me up as it startles me\".  Outcome: Ongoing, Not Progressing  Goal: Absence of Hospital-Acquired Illness or Injury  Intervention: Identify and Manage Fall Risk  Recent Flowsheet Documentation  Taken 7/24/2022 0243 by Elizabeth Barrera RN  Safety Promotion/Fall Prevention:    activity supervised    bed alarm on  Goal: Readiness for Transition of Care  Intervention: Mutually Develop Transition Plan  Recent " Flowsheet Documentation  Taken 7/24/2022 0246 by Elizabeth Barrera, RN  Equipment Currently Used at Home: none   Goal Outcome Evaluation:  Elizabeth Barrera, RN on 7/24/2022 at 3:06 AM

## 2022-07-24 NOTE — PROGRESS NOTES
"   07/24/22 1200   Quick Adds   Quick Adds Certification   Type of Visit Initial Occupational Therapy Evaluation   Living Environment   People in Home other (see comments)  (\"Landlord\")   Current Living Arrangements house   Home Accessibility stairs to enter home   Number of Stairs, Main Entrance 8   Stair Railings, Main Entrance   (\"has a railing\")   Transportation Anticipated car, drives self   Living Environment Comments Patient reports he lives in a home with 5 steps to enter and a railing. Once he's inside everything is on main level except laundry.  He hasn't been doing steps to basement and his landlord has been assisting with laundry.  He has a tub/shower combo with no bars and no shower chair.   Self-Care   Usual Activity Tolerance moderate   Current Activity Tolerance fair   Equipment Currently Used at Home none   Fall history within last six months no   Instrumental Activities of Daily Living (IADL)   Previous Responsibilities medication management;driving;meal prep   General Information   Onset of Illness/Injury or Date of Surgery 07/23/22   Referring Physician Dr. Morales   Patient/Family Therapy Goal Statement (OT) To return home   Existing Precautions/Restrictions fall   Cognitive Status Examination   Orientation Status orientation to person, place and time   Pain Assessment   Patient Currently in Pain Yes, see Vital Sign flowsheet   Strength Comprehensive (MMT)   Comment, General Manual Muscle Testing (MMT) Assessment WFL   Transfers   Transfer Comments CGA-SBA with FWW   Activities of Daily Living   BADL Assessment/Intervention toileting   Lower Body Dressing Assessment/Training   [place] Level (Lower Body Dressing) moderate assist (50% patient effort)   Position (Lower Body Dressing) sitting up in bed   Comment, (Lower Body Dressing) A to don bilateral socks.  Patient unable to reach feet with current edema.   Toileting   Comment, (Toileting) SBA and use of grab bar   [place] Level " (Toileting) supervision   Clinical Impression   Criteria for Skilled Therapeutic Interventions Met (OT) Yes, treatment indicated   OT Diagnosis Decreased independence with self-cares and functional mobility   Influenced by the following impairments Decreased balance, increased pain, decreased activity tolerance   OT Problem List-Impairments impacting ADL activity tolerance impaired;balance;pain;mobility   Assessment of Occupational Performance 1-3 Performance Deficits   Planned Therapy Interventions (OT) ADL retraining;transfer training;bed mobility training   Clinical Decision Making Complexity (OT) low complexity   Anticipated Equipment Needs Upon Discharge (OT) walker, rolling;shower chair   Risk & Benefits of therapy have been explained evaluation/treatment results reviewed;care plan/treatment goals reviewed;participants voiced agreement with care plan;participants included;patient   Clinical Impression Comments Patient is a 77-year-old male admitted with edema and CHF exacerbation.  Per patient's report he is mostly independent at home but his landlord assists him with some things like laundry.  Patient would benefit from HC OT/PT upon discharge for home safety evaluation and to continue improving overall strength, activity tolerance, and independence with self-cares and functional mobility.   OT Discharge Planning   OT Discharge Recommendation (DC Rec) home with home care occupational therapy   OT Rationale for DC Rec Patient would benefit from a home safety evaluation to address equipment needs.  He would also benefit from continuing therapy to address decreaed activity tolerance and independence with self-cares and functional mobility.   OT Brief overview of current status Patient participates well in PT/OT evaluation.  Requires Mod A for LB dressing, SBA for toileting and CGA with FWW for ambulation ~160'.  Patient does become short of breath with functional mobility but O2 sats remained in upper 80s and  90s.   Therapy Certification   Start of Care Date 07/24/22   Certification date from 07/24/22   Certification date to 08/07/22   Total Evaluation Time (Minutes)   Total Evaluation Time (Minutes) 15   OT Goals   Therapy Frequency (OT) Daily   OT Predicted Duration/Target Date for Goal Attainment 07/28/22   OT Goals Lower Body Dressing;Toilet Transfer/Toileting;Hygiene/Grooming   OT: Hygiene/Grooming supervision/stand-by assist   OT: Lower Body Dressing Supervision/stand-by assist   OT: Toilet Transfer/Toileting Supervision/stand-by assist

## 2022-07-24 NOTE — ED PROVIDER NOTES
Emergency Department Provider Note  : 1945 Age: 77 year old Sex: male MRN: [id number]    Chief Complaint   Patient presents with     Groin Swelling       Medical Decision Making / Assessment / Plan   77 year old male presenting with severe CHF exacerbation.  He will need admission and diuresis.  We will start with some basic labs, chest x-ray, EKG and Lasix tonight.  He has a beefy red rash in his right groin that appears to be skin irritation and not infectious in nature.    ED Course as of 22 0230   Sun 2022   0228 Creatinine returned at 1.59, BNP at 1800.  Remainder of his labs are generally unremarkable.  Chest x-ray shows cardiomegaly, pulmonary edema and right greater than left pleural effusions.  We will plan to admit for continued diuresis.  I texted with Dr. Morales, the hospitalist who was agreeable to this.         Final diagnoses:   Acute on chronic congestive heart failure, unspecified heart failure type (H)   Scrotal swelling   Bilateral lower extremity edema       Alton Mackey MD  2022   Emergency Department    Smita Alan is a 77 year old male who presents at 11:36 PM with swelling and lower extremity swelling x2 days.  Patient reports that he has a history of CHF and is normally on Lasix but it does not seem to be helping as much as usual.  He is also getting short of breath much more easily.  He reports dyspnea on exertion although this is not consistent.  He is not on oxygen at home.  He has not been hospitalized for approximately 5 years for this.  He denies orthopnea.  Denies any chest pain, fever.  States he has some cough occasionally when he feels congested.    I have reviewed the Medications, Allergies, Past Medical and Surgical History, and Social History in the [employer] and with family.    Review of Systems:  See HPI for pertinent positives and negatives. All other systems reviewed and found to be negative.      Objective   BP: 104/62  Pulse:  "70  Temp: 97.7  F (36.5  C)  Resp: 25  Height: 160 cm (5' 3\")  Weight: 86.2 kg (190 lb)  SpO2: 97 %    Physical Exam:   General: awake and alert, few word dyspnea  Head: normocephalic, atraumatic  Eyes:conjugate gaze  ENT: moist membranes  Chest/Respiratory: mildly increased resp effort  Cardiovascular: warm and well perfused  Abdominal: soft, non-distended, non-tender  : diffusely swollen scrotum and penis without signs of infection, tip of penis not visible 2/2 swelling  Extremities: mobility at baseline  Neurological: moving all extremities, normal speech, gait at baseline  Skin: tense BLE and faint erythema throughout, beefy red rash R groin consistent with skin irritation, non-tender, no crepitus  Psychiatric: appropriate affect        Medical/Surgical History:  History reviewed. No pertinent past medical history.  History reviewed. No pertinent surgical history.    Medications:  Current Facility-Administered Medications   Medication     zinc Oxide (DESITIN) 40 % paste     Current Outpatient Medications   Medication     aspirin (ASA) 81 MG chewable tablet     atorvastatin (LIPITOR) 40 MG tablet     clopidogrel (PLAVIX) 75 MG tablet     furosemide (LASIX) 40 MG tablet     latanoprost (XALATAN) 0.005 % ophthalmic solution     metoprolol succinate ER (TOPROL-XL) 25 MG 24 hr tablet       Allergies:  Patient has no known allergies.    Relevant labs, images, EKGs, Epic and outside hospital (if applicable) charts were reviewed. The findings, diagnosis, plan, and need for follow up were discussed with the patient/family. Nursing notes were reviewed.        Alton Mackey MD  07/24/22 0230    "

## 2022-07-24 NOTE — H&P
"[facility]  History and Physical  Hospitalist       Date of Admission:  7/23/2022    Assessment & Plan   Waqas Ferro is a 77 year old male who presents with edema.    Clinically Significant Risk Factors Present on Admission                # Platelet Defect: home medication list includes an antiplatelet medication     # Obesity: Estimated body mass index is 33.67 kg/m  as calculated from the following:    Height as of this encounter: 1.6 m (5' 2.99\").    Weight as of this encounter: 86.2 kg (190 lb).             >  Acute on chronic congestive heart failure, unspecified heart failure type (H)  >  Scrotal swelling  >  Bilateral lower extremity edema  >  CHF exacerbation (H)   -- Daily weight   -- strict I/Os   -- Fluid restrict   -- Sodium restrict   -- Elevate legs and scrotum   -- IV lasix   -- Serial labs   -- PT/OT consult for home safety     > Acute kidney injury  May be a cardiorenal component.   -- Monitor      DVT Prophylaxis: Enoxaparin (Lovenox) SQ  Code Status: Full Code    Armando Morales MD    Primary Care Physician   Hugo Hannah    Chief Complaint   Swollen scrotum    History is obtained from the patient and chart review.    History of Present Illness   Waqas Ferro is a 77 year old male who presents with swollen scrotum.  He has a history of CAD s/p stents and HFrEF who follows with [facility].  He has had 2-3 days of increasing lower extremity edema and scrotal edema.  He endorses eating more salt in the past few days.  No orthopnea. No chest pain.    Past Medical History    I have reviewed this patient's medical history and updated it with pertinent information if needed.   History reviewed. No pertinent past medical history.    Past Surgical History   I have reviewed this patient's surgical history and updated it with pertinent information if needed.  History reviewed. No pertinent surgical history.    Prior to Admission Medications   Prior to Admission " Medications   Prescriptions Last Dose Informant Patient Reported? Taking?   aspirin (ASA) 81 MG chewable tablet 7/23/2022 at Unknown time  Yes Yes   Sig: Take 81 mg by mouth daily   atorvastatin (LIPITOR) 40 MG tablet 7/22/2022 at Unknown time  Yes Yes   Sig: Take 40 mg by mouth At Bedtime   clopidogrel (PLAVIX) 75 MG tablet 7/23/2022 at Unknown time  Yes Yes   Sig: Take 75 mg by mouth daily   furosemide (LASIX) 40 MG tablet 7/23/2022 at Unknown time  Yes Yes   Sig: Take 40 mg by mouth daily   latanoprost (XALATAN) 0.005 % ophthalmic solution 7/23/2022 at Unknown time  Yes Yes   Sig: Place 1 drop into both eyes At Bedtime   metoprolol succinate ER (TOPROL-XL) 25 MG 24 hr tablet 7/23/2022 at Unknown time  Yes Yes   Sig: Take 25 mg by mouth 2 times daily      Facility-Administered Medications: None     Allergies   No Known Allergies    Social History   I have reviewed this patient's social history and updated it with pertinent information if needed. Waqas Ferro  reports that he quit smoking about 42 years ago. His smoking use included cigarettes. He has a 80.00 pack-year smoking history. He has never used smokeless tobacco. He reports previous alcohol use of about 2.0 standard drinks of alcohol per week. He reports that he does not use drugs.    Family History   I have reviewed this patient's family history and updated it with pertinent information if needed.   History reviewed. No pertinent family history.    Review of Systems     REVIEW OF SYSTEMS:    Constitutional: normal energy and appetite, no recent sick contacts  Eyes: no changes in vision  Ears, nose, mouth, throat, and face: no mouth sores, dysphagia, or odynophagia  Respiratory: see hpi  Cardiovascular: see hpi  Gastrointestinal: no constipation, diarrhea, nausea, vomiting or abdominal pain.  Genitourinary: no dysuria, hematuria, urgency or frequency.   Hematologic/lymphatic: no unintentional weight loss or night sweats.  Musculoskeletal: no pain to  extremities or falls.   Neurological: no new weakness, tingling, numbness.   Psychiatric: no hallucinations ordelusions.  Endocrine:  not a known diabetic    Additions to the above include: see hpi    Physical Exam   Temp: 97.5  F (36.4  C) Temp src: Tympanic BP: 108/71 Pulse: 66   Resp: 18 SpO2: 94 % O2 Device: None (Room air)    Vital Signs with Ranges  Temp:  [97.5  F (36.4  C)-98.2  F (36.8  C)] 97.5  F (36.4  C)  Pulse:  [66-70] 66  Resp:  [18-25] 18  BP: (104-108)/(62-75) 108/71  SpO2:  [94 %-97 %] 94 %  190 lbs 0 oz    Gen: Alert, NAD.  HEENT: MMM  Neck: Supple  CV: RRR no m/r/g  Pulm: CTAB, no w/r/r. No increased work of breathing  Msk: 2+ LE edema  : Grossly edematous penis and scrotum  Abd: Soft  Neuro: Grossly intact  Skin: No concerning lesions.  Psychiatric: Normal affect and insight. Does not appear anxious or depressed.        Data   Data reviewed today:  I personally reviewed see below.  Recent Labs   Lab 07/24/22  0000   WBC 7.5   HGB 12.1*   MCV 87         POTASSIUM 3.9   CHLORIDE 100   CO2 29   BUN 26*   CR 1.59*   ANIONGAP 6   SARABJIT 9.1          Recent Results (from the past 24 hour(s))   XR Chest 2 Views    Narrative    PROCEDURE INFORMATION:   Exam: XR Chest   Exam date and time: 7/24/2022 1:52 AM   Age: 77 years old   Clinical indication: Shortness of breath; Additional info: Volume overload, SOB     TECHNIQUE:   Imaging protocol: Radiologic exam of the chest.   Views: 2 views.     COMPARISON:   CR XR CHEST PORT 1 VW 12/6/2018 9:19 PM     FINDINGS:   Lungs: Left lung is well expanded. Eventration of the right hemidiaphragm.   Indistinct pulmonary vasculature. The linear opacities in the right lower lung   zone.   Pleural spaces: Blunting of the bilateral costophrenic sulci. No pneumothorax.   Heart/Mediastinum: Cardiomediastinal silhouette is prominent in size and stable   in contour.   Bones/joints: Osseous structures are stable.       Impression    IMPRESSION:   1.  Prominence of the cardiac silhouette which may reflect cardiomegaly and/or   pericardial effusion.   2. Findings compatible with pulmonary interstitial edema with bilateral pleural   effusions, right greater than left with adjacent airspace disease. Cannot   exclude a loculated fluid component on the right.   3. Coarse pericardial calcifications cannot exclude sequelae of prior trauma,   surgery, or infection.     THIS DOCUMENT HAS BEEN ELECTRONICALLY SIGNED BY ANGELA ANDREW MD

## 2022-07-25 ENCOUNTER — APPOINTMENT (OUTPATIENT)
Dept: PHYSICAL THERAPY | Facility: OTHER | Age: 77
DRG: 292 | End: 2022-07-25
Payer: MEDICARE

## 2022-07-25 ENCOUNTER — APPOINTMENT (OUTPATIENT)
Dept: CARDIOLOGY | Facility: OTHER | Age: 77
DRG: 292 | End: 2022-07-25
Attending: INTERNAL MEDICINE
Payer: MEDICARE

## 2022-07-25 ENCOUNTER — APPOINTMENT (OUTPATIENT)
Dept: OCCUPATIONAL THERAPY | Facility: OTHER | Age: 77
DRG: 292 | End: 2022-07-25
Payer: MEDICARE

## 2022-07-25 PROBLEM — I25.10 CORONARY ARTERY DISEASE INVOLVING NATIVE CORONARY ARTERY OF NATIVE HEART WITHOUT ANGINA PECTORIS: Status: ACTIVE | Noted: 2022-07-25

## 2022-07-25 LAB
ANION GAP SERPL CALCULATED.3IONS-SCNC: 8 MMOL/L (ref 3–14)
BI-PLANE LVEF ECHO: NORMAL
BUN SERPL-MCNC: 26 MG/DL (ref 7–25)
CALCIUM SERPL-MCNC: 8.8 MG/DL (ref 8.6–10.3)
CHLORIDE BLD-SCNC: 102 MMOL/L (ref 98–107)
CO2 SERPL-SCNC: 27 MMOL/L (ref 21–31)
CREAT SERPL-MCNC: 1.42 MG/DL (ref 0.7–1.3)
ERYTHROCYTE [DISTWIDTH] IN BLOOD BY AUTOMATED COUNT: 20.1 % (ref 10–15)
GFR SERPL CREATININE-BSD FRML MDRD: 52 ML/MIN/1.73M2
GLUCOSE BLD-MCNC: 91 MG/DL (ref 70–105)
HCT VFR BLD AUTO: 37.1 % (ref 40–53)
HGB BLD-MCNC: 11.9 G/DL (ref 13.3–17.7)
MCH RBC QN AUTO: 27.2 PG (ref 26.5–33)
MCHC RBC AUTO-ENTMCNC: 32.1 G/DL (ref 31.5–36.5)
MCV RBC AUTO: 85 FL (ref 78–100)
PLATELET # BLD AUTO: 167 10E3/UL (ref 150–450)
POTASSIUM BLD-SCNC: 3.6 MMOL/L (ref 3.5–5.1)
RBC # BLD AUTO: 4.38 10E6/UL (ref 4.4–5.9)
SODIUM SERPL-SCNC: 137 MMOL/L (ref 134–144)
WBC # BLD AUTO: 6 10E3/UL (ref 4–11)

## 2022-07-25 PROCEDURE — 93306 TTE W/DOPPLER COMPLETE: CPT | Mod: 26 | Performed by: INTERNAL MEDICINE

## 2022-07-25 PROCEDURE — 36415 COLL VENOUS BLD VENIPUNCTURE: CPT | Performed by: INTERNAL MEDICINE

## 2022-07-25 PROCEDURE — 99232 SBSQ HOSP IP/OBS MODERATE 35: CPT | Performed by: FAMILY MEDICINE

## 2022-07-25 PROCEDURE — 250N000013 HC RX MED GY IP 250 OP 250 PS 637: Performed by: INTERNAL MEDICINE

## 2022-07-25 PROCEDURE — 999N000208 ECHOCARDIOGRAM COMPLETE

## 2022-07-25 PROCEDURE — 250N000011 HC RX IP 250 OP 636: Performed by: INTERNAL MEDICINE

## 2022-07-25 PROCEDURE — 255N000002 HC RX 255 OP 636: Performed by: FAMILY MEDICINE

## 2022-07-25 PROCEDURE — 97116 GAIT TRAINING THERAPY: CPT | Mod: GP

## 2022-07-25 PROCEDURE — 97530 THERAPEUTIC ACTIVITIES: CPT | Mod: GP

## 2022-07-25 PROCEDURE — 80048 BASIC METABOLIC PNL TOTAL CA: CPT | Performed by: INTERNAL MEDICINE

## 2022-07-25 PROCEDURE — 85027 COMPLETE CBC AUTOMATED: CPT | Performed by: INTERNAL MEDICINE

## 2022-07-25 PROCEDURE — 97530 THERAPEUTIC ACTIVITIES: CPT | Mod: GO | Performed by: OCCUPATIONAL THERAPIST

## 2022-07-25 PROCEDURE — 120N000001 HC R&B MED SURG/OB

## 2022-07-25 RX ADMIN — FUROSEMIDE 60 MG: 10 INJECTION, SOLUTION INTRAVENOUS at 18:23

## 2022-07-25 RX ADMIN — TRAMADOL HYDROCHLORIDE 50 MG: 50 TABLET, COATED ORAL at 23:17

## 2022-07-25 RX ADMIN — ENOXAPARIN SODIUM 40 MG: 40 INJECTION SUBCUTANEOUS at 11:01

## 2022-07-25 RX ADMIN — FUROSEMIDE 60 MG: 10 INJECTION, SOLUTION INTRAVENOUS at 02:14

## 2022-07-25 RX ADMIN — TRAMADOL HYDROCHLORIDE 50 MG: 50 TABLET, COATED ORAL at 07:29

## 2022-07-25 RX ADMIN — ATORVASTATIN CALCIUM 40 MG: 40 TABLET, FILM COATED ORAL at 21:58

## 2022-07-25 RX ADMIN — FUROSEMIDE 60 MG: 10 INJECTION, SOLUTION INTRAVENOUS at 11:00

## 2022-07-25 RX ADMIN — CLOPIDOGREL BISULFATE 75 MG: 75 TABLET, FILM COATED ORAL at 11:01

## 2022-07-25 RX ADMIN — ACETAMINOPHEN 650 MG: 325 TABLET, FILM COATED ORAL at 13:34

## 2022-07-25 RX ADMIN — Medication: at 02:14

## 2022-07-25 RX ADMIN — PERFLUTREN 4 ML: 6.52 INJECTION, SUSPENSION INTRAVENOUS at 09:35

## 2022-07-25 RX ADMIN — ASPIRIN 81 MG 81 MG: 81 TABLET ORAL at 11:01

## 2022-07-25 ASSESSMENT — ACTIVITIES OF DAILY LIVING (ADL)
ADLS_ACUITY_SCORE: 33
ADLS_ACUITY_SCORE: 27
ADLS_ACUITY_SCORE: 32
ADLS_ACUITY_SCORE: 33
ADLS_ACUITY_SCORE: 27
ADLS_ACUITY_SCORE: 32

## 2022-07-25 NOTE — PROGRESS NOTES
SAFETY CHECKLIST  ID Bands and Risk clasps correct and in place (DNR, Fall risk, Allergy, Latex, Limb):  Yes  All Lines Reconciled and labeled correctly: Yes  Whiteboard updated:Yes  Environmental interventions (bed/chair alarm on, call light, side rails, restraints, sitter....): Yes     Tele # 6

## 2022-07-25 NOTE — PROGRESS NOTES
07/25/22 1454   Signing Clinician's Name / Credentials   Signing clinician's name / credentials Colten Costa MPT   Quick Adds   Quick Adds Interventions Mobility   Functional Transfer Training   Treatment Detail CGA with and without use of Fww   Gait Training   Symptoms Noted During/After Treatment (Gait Training) fatigue   Distance in Feet (Required for LE Total Joints) 150   Treatment Detail ambulation in hallway   [place] Level (Gait Training) contact guard   Physical Assistance Level (Gait Training) 1 person assist   Weight Bearing (Gait Training) full weight-bearing   Assistive Device (Gait Training) rolling walker   Pattern Analysis (Gait Training) 2-point gait   Gait Analysis Deviations decreased sandy   Impairments (Gait Analysis/Training) strength decreased   Therapeutic Activity   Treatment Detail bed mobilities with SBA   PT Discharge Planning   PT Discharge Recommendation (DC Rec) home with home care physical therapy   PT Rationale for DC Rec to promote increased functional activity/gait tolerance   PT Brief overview of current status CGA to SBA for all mobilities with increased activity tolerance using a Fww for gait   Additional Documentation   Rehab Comments patient may benefit from continued PT   PT Plan continue PT   Total Session Time   Total Session Time (minutes) 25 minutes

## 2022-07-25 NOTE — ASSESSMENT & PLAN NOTE
Presenting with fluid retention in legs with scrotal swelling  Known previous history of ischemic cardiomyopathy with previous echo showing EF less than 30%  Echocardiogram today showing EF of approximately 22%  Diuresing with Lasix, slow improvement of swelling.  Not requiring supplemental oxygen  Continue diuresis, will Ace wrap legs  Because of lower pressures, his ARB and metoprolol were held  Down about 12 pound overall,  legs less swollen, less short of air  Home today with OP cardiology follow-up at [facility], discharge on slightly higher dosing of lasix, 120 mg in AM, 80 mg in PM until cards follow-up  At discharge holding dose of metoprolol and losartan until seen by primary care provider  HHC ordered with daily weights, and PT/OT ordered

## 2022-07-25 NOTE — PROGRESS NOTES
SAFETY CHECKLIST  ID Bands and Risk clasps correct and in place (DNR, Fall risk, Allergy, Latex, Limb):  Yes  All Lines Reconciled and labeled correctly: Yes  Whiteboard updated:Yes  Environmental interventions (bed/chair alarm on, call light, side rails, restraints, sitter....): Yes  Verify Tele:6  Elizabeth Barrera RN on 7/24/2022 at 7:16 PM

## 2022-07-25 NOTE — PROGRESS NOTES
Weights reviewed per MST screen: pt has not had wt loss in past few years, has in fact gained weight. He reported no poor appetite.   Wt Readings from Last 10 Encounters:   07/25/22 85.7 kg (189 lb)   06/07/19 73 kg (161 lb)   05/08/19 75.6 kg (166 lb 11.2 oz)   04/09/19 75.1 kg (165 lb 9.6 oz)   02/04/19 78.2 kg (172 lb 6.4 oz)   12/06/18 78.5 kg (173 lb)       Diet instruction:   Spoke with pt today about heart healthy nutrition therapy. He does use the salt shaker and White seasonings. He lives with a roommate and usually cooks for himself but does share meals at times. Encouraged as much scratch cooking as possible. He does not typically eat canned soups. Discussed alternative seasonings. He does have a scale and weighs himself daily, encouraged to continue. Provided with handouts and explanation. Pt stated understanding and had no questions at this time. Do expect improved compliance with recommendations.   Brittany Cheung RD on 7/25/2022 at 2:09 PM

## 2022-07-25 NOTE — PLAN OF CARE
P[facility]net admitted with Chronic CHF patient has +3-+4 pitting edema . Bps were running lower but patient was able to receive his scheduled lasix, patient was able to void in urinal at start of shift but then was switched to a primo fit for more accurate I&O. Patient had good output in container. Will continue to monitor and treat as needed. Elizabeth Barrera RN on 7/25/2022 at 5:42 AM  Problem: Plan of Care - These are the overarching goals to be used throughout the patient stay.    Goal: Plan of Care Review/Shift Note  Description: The Plan of Care Review/Shift note should be completed every shift.  The Outcome Evaluation is a brief statement about your assessment that the patient is improving, declining, or no change.  This information will be displayed automatically on your shift note.  Outcome: Ongoing, Not Progressing  Flowsheets (Taken 7/25/2022 0535)  Plan of Care Reviewed With: patient  Overall Patient Progress: no change  Goal: Absence of Hospital-Acquired Illness or Injury  Intervention: Identify and Manage Fall Risk  Recent Flowsheet Documentation  Taken 7/25/2022 0300 by Elizabeth Barrera RN  Safety Promotion/Fall Prevention: safety round/check completed  Taken 7/24/2022 2000 by Elizabeth Barrera RN  Safety Promotion/Fall Prevention: safety round/check completed  Intervention: Prevent and Manage VTE (Venous Thromboembolism) Risk  Recent Flowsheet Documentation  Taken 7/24/2022 2000 by Elizabeth Barrera RN  VTE Prevention/Management: SCDs (sequential compression devices) off  Activity Management: activity adjusted per tolerance  Goal: Optimal Comfort and Wellbeing  Intervention: Monitor Pain and Promote Comfort  Recent Flowsheet Documentation  Taken 7/24/2022 2000 by Elizabeth Barrera RN  Pain Management Interventions: declines     Problem: Heart Failure Comorbidity  Goal: Maintenance of Heart Failure Symptom Control  Intervention: Maintain Heart Failure-Management  Recent Flowsheet Documentation  Taken  7/24/2022 2000 by Elizabeth Barrera, RN  Medication Review/Management:   medications reviewed   high-risk medications identified   Goal Outcome Evaluation:    Plan of Care Reviewed With: patient     Overall Patient Progress: no change

## 2022-07-25 NOTE — PROGRESS NOTES
[facility]    Medicine Progress Note - Hospitalist Service    Date of Admission:  7/23/2022    Assessment & Plan            Acute on chronic congestive heart failure, unspecified heart failure type (H)  Presenting with fluid retention in legs with scrotal swelling  Known previous history of ischemic cardiomyopathy with previous echo showing EF less than 30%  Echocardiogram today showing EF of approximately 22%  Diuresing with Lasix, slow improvement of swelling.  Not requiring supplemental oxygen  Continue diuresis, will Ace wrap legs  Because of lower pressures, his ARB and metoprolol are on hold  Likely home in 1 to 2 days    Acute kidney injury (H)  Initial elevation in creatinine, slowly trending down with diuresis  Continue to monitor    Coronary artery disease involving native coronary artery of native heart without angina pectoris  Followed by [facility] cardiology with a number of stenting's and evaluations in the past  Continues on daily Plavix  Asymptomatic with no anginal symptoms  Echocardiogram showing persistent ischemic cardiomyopathy with no acute changes  Continue with outpatient cardiology management    Scrotal swelling  Due to fluid overload related to CHF  As above           Diet: Combination Diet 2 gm NA Diet; Low Saturated Fat Diet  Fluid restriction 2000 ML FLUID    DVT Prophylaxis: Enoxaparin (Lovenox) SQ  Sinclair Catheter: Not present  Central Lines: None  Cardiac Monitoring: ACTIVE order. Indication: Acute decompensated heart failure (48 hours)  Code Status: Full Code      Disposition Plan    likely home when 1 to 2 days     The patient's care was discussed with the Bedside Nurse and Patient.    Seamus Bauman MD  Hospitalist Service  [facility]  Securely message with the Vocera Web Console (learn more here)  Text page via [employer] Paging/Directory         Clinically Significant Risk Factors Present on Admission               # Obesity: Estimated  "body mass index is 33.49 kg/m  as calculated from the following:    Height as of this encounter: 1.6 m (5' 2.99\").    Weight as of this encounter: 85.7 kg (189 lb).        ______________________________________________________________________    Interval History   Feeling better.  Feels like the swelling in his legs has improved as has the scrotal swelling.  Denies any shortness of breath at rest, not requiring oxygen.  Denies any pain    Data reviewed today: I reviewed all medications, new labs and imaging results over the last 24 hours. I personally reviewed no images or EKG's today.    Physical Exam   Vital Signs: Temp: 97.4  F (36.3  C) Temp src: Tympanic BP: 100/64 Pulse: 75   Resp: 20 SpO2: 92 % O2 Device: None (Room air)    Weight: 189 lbs 0 oz  Constitutional: awake, alert, cooperative, no apparent distress, and appears stated age  Respiratory: No increased work of breathing, good air exchange, clear to auscultation bilaterally, no crackles or wheezing  Cardiovascular: regular rate and rhythm  GI: normal bowel sounds, soft, non-distended and non-tender  Musculoskeletal: Legs are fairly tense to the knee level, no skin breakdown, joints normal    Data   Recent Labs   Lab 07/25/22  0546 07/24/22  0937 07/24/22  0000   WBC 6.0  --  7.5   HGB 11.9*  --  12.1*   MCV 85  --  87     --  185     --  135   POTASSIUM 3.6  --  3.9   CHLORIDE 102  --  100   CO2 27  --  29   BUN 26*  --  26*   CR 1.42* 1.51* 1.59*   ANIONGAP 8  --  6   SARABJIT 8.8  --  9.1   GLC 91  --  100     "

## 2022-07-25 NOTE — ASSESSMENT & PLAN NOTE
Followed by [facility]ology with a number of stenting's and evaluations in the past  Continues on daily Plavix  Asymptomatic with no anginal symptoms  Echocardiogram showing persistent ischemic cardiomyopathy with no acute changes  Continue with outpatient cardiology management

## 2022-07-25 NOTE — PLAN OF CARE
Patient voided 750 mL this shift with one incontinent episode in chair.  Lungs are clear and diminihsed in bases with R>L.  Patient has dyspnea with exertion, occasionally at rest, will deep breath to catch breath, has remained on room air with Sp02 >90%.  +3 BLE edema, legs wrapped this afternoon with ACE wraps, patient did not tolerate for long period of time, has been compliant with keeping them elevated and scrotum elevated with washcloths in bed as able.  No open areas to skin or weeping areas.  Chronic knee pain treated with tramadol and tylenol.  Patient has been compliant with fluid restriction.  Blood pressures have remained on the low side but systolics >95 and able to receive lasix doses.      Problem: Plan of Care - These are the overarching goals to be used throughout the patient stay.    Goal: Plan of Care Review/Shift Note    7/25/2022 1836 by Angelina Blanco RN  Outcome: Ongoing, Progressing  Flowsheets (Taken 7/25/2022 1836)  Plan of Care Reviewed With: patient  Overall Patient Progress: improving  7/25/2022 1836 by Angelina Blanco RN  Outcome: Ongoing, Progressing  Flowsheets (Taken 7/25/2022 1836)  Plan of Care Reviewed With: patient  Overall Patient Progress: improving  Goal: Absence of Hospital-Acquired Illness or Injury  Intervention: Identify and Manage Fall Risk  Recent Flowsheet Documentation  Taken 7/25/2022 1505 by Angelina Blanco RN  Safety Promotion/Fall Prevention:   activity supervised   assistive device/personal items within reach   bed alarm on   clutter free environment maintained   fall prevention program maintained   nonskid shoes/slippers when out of bed   patient and family education   room organization consistent   safety round/check completed   supervised activity   treat reversible contributory factors   treat underlying cause  Taken 7/25/2022 0730 by Angelina Blanco RN  Safety Promotion/Fall Prevention:   activity supervised   assistive device/personal  items within reach   bed alarm on   clutter free environment maintained   fall prevention program maintained   nonskid shoes/slippers when out of bed   patient and family education   room organization consistent   safety round/check completed   supervised activity   treat reversible contributory factors   treat underlying cause  Intervention: Prevent and Manage VTE (Venous Thromboembolism) Risk  Recent Flowsheet Documentation  Taken 7/25/2022 1505 by Angelina Blanco RN  VTE Prevention/Management: other (see comments)  Taken 7/25/2022 0730 by Angelina Blanco RN  VTE Prevention/Management: other (see comments)  Goal: Optimal Comfort and Wellbeing  Intervention: Monitor Pain and Promote Comfort  Recent Flowsheet Documentation  Taken 7/25/2022 0725 by Angelina Blanco RN  Pain Management Interventions: medication (see MAR)     Problem: Heart Failure Comorbidity  Goal: Maintenance of Heart Failure Symptom Control  Intervention: Maintain Heart Failure-Management  Recent Flowsheet Documentation  Taken 7/25/2022 1505 by Angelina Blanco RN  Medication Review/Management:   medications reviewed   high-risk medications identified  Taken 7/25/2022 0730 by Angelina Blanco RN  Medication Review/Management:   medications reviewed   high-risk medications identified   Goal Outcome Evaluation:

## 2022-07-26 ENCOUNTER — APPOINTMENT (OUTPATIENT)
Dept: OCCUPATIONAL THERAPY | Facility: OTHER | Age: 77
DRG: 292 | End: 2022-07-26
Payer: MEDICARE

## 2022-07-26 ENCOUNTER — TELEPHONE (OUTPATIENT)
Dept: FAMILY MEDICINE | Facility: OTHER | Age: 77
End: 2022-07-26

## 2022-07-26 ENCOUNTER — APPOINTMENT (OUTPATIENT)
Dept: PHYSICAL THERAPY | Facility: OTHER | Age: 77
DRG: 292 | End: 2022-07-26
Payer: MEDICARE

## 2022-07-26 LAB
ANION GAP SERPL CALCULATED.3IONS-SCNC: 7 MMOL/L (ref 3–14)
ATRIAL RATE - MUSE: 64 BPM
BUN SERPL-MCNC: 30 MG/DL (ref 7–25)
CALCIUM SERPL-MCNC: 8.9 MG/DL (ref 8.6–10.3)
CHLORIDE BLD-SCNC: 101 MMOL/L (ref 98–107)
CO2 SERPL-SCNC: 28 MMOL/L (ref 21–31)
CREAT SERPL-MCNC: 1.55 MG/DL (ref 0.7–1.3)
DIASTOLIC BLOOD PRESSURE - MUSE: NORMAL MMHG
ERYTHROCYTE [DISTWIDTH] IN BLOOD BY AUTOMATED COUNT: 20.2 % (ref 10–15)
GFR SERPL CREATININE-BSD FRML MDRD: 46 ML/MIN/1.73M2
GLUCOSE BLD-MCNC: 100 MG/DL (ref 70–105)
HCT VFR BLD AUTO: 37.6 % (ref 40–53)
HGB BLD-MCNC: 12 G/DL (ref 13.3–17.7)
INTERPRETATION ECG - MUSE: NORMAL
MCH RBC QN AUTO: 27.3 PG (ref 26.5–33)
MCHC RBC AUTO-ENTMCNC: 31.9 G/DL (ref 31.5–36.5)
MCV RBC AUTO: 86 FL (ref 78–100)
P AXIS - MUSE: 56 DEGREES
PLATELET # BLD AUTO: 170 10E3/UL (ref 150–450)
POTASSIUM BLD-SCNC: 3.8 MMOL/L (ref 3.5–5.1)
PR INTERVAL - MUSE: 198 MS
QRS DURATION - MUSE: 74 MS
QT - MUSE: 446 MS
QTC - MUSE: 460 MS
R AXIS - MUSE: -29 DEGREES
RBC # BLD AUTO: 4.4 10E6/UL (ref 4.4–5.9)
SODIUM SERPL-SCNC: 136 MMOL/L (ref 134–144)
SYSTOLIC BLOOD PRESSURE - MUSE: NORMAL MMHG
T AXIS - MUSE: -60 DEGREES
VENTRICULAR RATE- MUSE: 64 BPM
WBC # BLD AUTO: 5.7 10E3/UL (ref 4–11)

## 2022-07-26 PROCEDURE — 97530 THERAPEUTIC ACTIVITIES: CPT | Mod: GP

## 2022-07-26 PROCEDURE — 82310 ASSAY OF CALCIUM: CPT | Performed by: INTERNAL MEDICINE

## 2022-07-26 PROCEDURE — 250N000011 HC RX IP 250 OP 636: Performed by: INTERNAL MEDICINE

## 2022-07-26 PROCEDURE — 99232 SBSQ HOSP IP/OBS MODERATE 35: CPT | Performed by: FAMILY MEDICINE

## 2022-07-26 PROCEDURE — 120N000001 HC R&B MED SURG/OB

## 2022-07-26 PROCEDURE — 97535 SELF CARE MNGMENT TRAINING: CPT | Mod: GO | Performed by: OCCUPATIONAL THERAPIST

## 2022-07-26 PROCEDURE — 97116 GAIT TRAINING THERAPY: CPT | Mod: GP

## 2022-07-26 PROCEDURE — 250N000013 HC RX MED GY IP 250 OP 250 PS 637: Performed by: INTERNAL MEDICINE

## 2022-07-26 PROCEDURE — 36415 COLL VENOUS BLD VENIPUNCTURE: CPT | Performed by: INTERNAL MEDICINE

## 2022-07-26 PROCEDURE — 85027 COMPLETE CBC AUTOMATED: CPT | Performed by: INTERNAL MEDICINE

## 2022-07-26 RX ADMIN — FUROSEMIDE 60 MG: 10 INJECTION, SOLUTION INTRAVENOUS at 09:12

## 2022-07-26 RX ADMIN — FUROSEMIDE 60 MG: 10 INJECTION, SOLUTION INTRAVENOUS at 02:53

## 2022-07-26 RX ADMIN — FUROSEMIDE 60 MG: 10 INJECTION, SOLUTION INTRAVENOUS at 17:51

## 2022-07-26 RX ADMIN — CLOPIDOGREL BISULFATE 75 MG: 75 TABLET, FILM COATED ORAL at 09:13

## 2022-07-26 RX ADMIN — ASPIRIN 81 MG 81 MG: 81 TABLET ORAL at 09:13

## 2022-07-26 RX ADMIN — ATORVASTATIN CALCIUM 40 MG: 40 TABLET, FILM COATED ORAL at 21:20

## 2022-07-26 RX ADMIN — ENOXAPARIN SODIUM 40 MG: 40 INJECTION SUBCUTANEOUS at 09:13

## 2022-07-26 ASSESSMENT — ACTIVITIES OF DAILY LIVING (ADL)
ADLS_ACUITY_SCORE: 32

## 2022-07-26 NOTE — PROGRESS NOTES
07/25/22 1536   Signing Clinician's Name / Credentials   Signing clinician's name / credentials Lyric Serrano OTR/L   Quick Adds   Rehab Discipline OT   Functional Transfer Training   Symptoms Noted During/After Treatment fatigue;shortness of breath   Treatment Detail O2 sats dropped with activity   Gait Training   Treatment Detail ambulation in hallway   [place] Level (Gait Training) contact guard   Physical Assistance Level (Gait Training) 1 person assist   Assistive Device (Gait Training) rolling walker   OT Discharge Planning   OT Discharge Recommendation (DC Rec) home with home care occupational therapy   OT Rationale for DC Rec Pt may benefit from home OT to address energy conservation with ADL's and functional mobility   OT Brief overview of current status Pt very pleasant, agreeable to get up and ambulate/up to chair, pt ambulated with CGA using FWW, SOB with all activity and O2 sats dropped with walking, but recovered quickly   Additional Documentation   OT Plan cont OT   Total Session Time   Total Session Time (minutes) 30 minutes

## 2022-07-26 NOTE — PROGRESS NOTES
:     Met with patient in room to discuss discharge planning needs. Patient stated that he is agreeable to home care for PT/OT/RN services.     Pt/family was given the Medicare Compare list for Home Care, with associated star ratings to assist with choice for referrals/discharge planning Yes    Education was given to pt/family that star ratings are updated/maintained by Medicare and can be reviewed by visiting www.medicare.gov Yes     A referral has been faxed to [facility].     GRECIA Welch on 7/26/2022 at 1:14 PM     :     Spoke with Pearl from [facility] and she stated that they can accept patient. Orders have been faxed.     GRECIA Welch on 7/26/2022 at 1:43 PM

## 2022-07-26 NOTE — PLAN OF CARE
Goal Outcome Evaluation:  Pt is here for CHF. BLE with +2 to +3 edema, scrotal edema present. Pt educated on elevating legs. Legs elevated on pillows. Primofit in place for urinary output, device leaking intermittently. Had BM overnight. Lungs are clear throughout. O2 stable on RA. Pt face becomes dark red with rolling in bed. Slight SOB with activity. Pt has an infrequent, productive cough present. Cough drops at bedside. On telemetry being monitored. Skin has scattered bruising. Coccyx red and blanchable. PRN Tramadol given prior to bed. IV saline locked. Up SBA in room. Angelica Carrizales RN on 7/26/2022 at 5:16 AM    Plan of Care Reviewed With: patient     Overall Patient Progress: improving

## 2022-07-26 NOTE — PROGRESS NOTES
07/26/22 1533   Signing Clinician's Name / Credentials   Signing clinician's name / credentials Lyric Serrano OTR/L   Quick Adds   Rehab Discipline OT   Functional Transfer Training   Symptoms Noted During/After Treatment fatigue;shortness of breath   Gait Training   Symptoms Noted During/After Treatment (Gait Training) fatigue;shortness of breath   [place] Level (Gait Training) contact guard   Physical Assistance Level (Gait Training) 1 person assist   Assistive Device (Gait Training) rolling walker   ADL Training   Minutes of Treatment Pt completed upper body bathing and grooming with set up and cues while seated, max assist to thread briefs and assist for william area bathing and L/B dressing.   Lower Body Dressing Training   Lower Body Dressing Training Assistance moderate assist (50% patient effort)   Lower Body Dressing Training Assistance 1 person assist   OT Discharge Planning   OT Discharge Recommendation (DC Rec) home with home care occupational therapy   OT Rationale for DC Rec Pt progressing well and would benefit from home care OT to assess home safety and DME needs as pt states he climbs in and out of tub at home and does not have any bathroom equipment, etc   OT Brief overview of current status Pt very pleasant and agreeable to therapy, moved supine to sit with SBA, CGA for mobilitty with FWW around room, completed upper body cares and grooming with SBA only, needed assist o alfonso brieft and bathe L/B due to limited reach, etc.   Additional Documentation   OT Plan cont OT   Total Session Time   Total Session Time (minutes) 30 minutes

## 2022-07-26 NOTE — PROGRESS NOTES
07/26/22 1200   Signing Clinician's Name / Credentials   Signing clinician's name / credentials Colten Costa MPT   Quick Adds   Rehab Discipline PT   Functional Transfer Training   Symptoms Noted During/After Treatment fatigue   Treatment Detail CGA to SBA with and without use of Fww   Gait Training   Symptoms Noted During/After Treatment (Gait Training) fatigue   Distance in Feet (Required for LE Total Joints) 150   Treatment Detail ambulation in hallway   [place] Level (Gait Training) stand-by assist   Physical Assistance Level (Gait Training) supervision   Weight Bearing (Gait Training) full weight-bearing   Assistive Device (Gait Training) rolling walker   Pattern Analysis (Gait Training) 2-point gait   Gait Analysis Deviations decreased sandy   Impairments (Gait Analysis/Training) strength decreased   PT Discharge Planning   PT Discharge Recommendation (DC Rec) home with home care physical therapy   PT Rationale for DC Rec to promote increased functional activity/gait tolerance   PT Brief overview of current status CGA to SBA for all mobilities with increased activity tolerance using a Fww for gait   Additional Documentation   Rehab Comments patient will benefit from continued PT [facility] home care   PT Plan continue PT   Total Session Time   Total Session Time (minutes) 25 minutes

## 2022-07-26 NOTE — TELEPHONE ENCOUNTER
The patient is discharging tomorrow and Rachid is wondering if Dr Hannah will accept following the patient for who they communicate for orders.

## 2022-07-26 NOTE — PROGRESS NOTES
[facility]    Medicine Progress Note - Hospitalist Service    Date of Admission:  7/23/2022    Assessment & Plan            Acute on chronic congestive heart failure, unspecified heart failure type (H)  Presenting with fluid retention in legs with scrotal swelling  Known previous history of ischemic cardiomyopathy with previous echo showing EF less than 30%  Echocardiogram today showing EF of approximately 22%  Diuresing with Lasix, slow improvement of swelling.  Not requiring supplemental oxygen  Continue diuresis, will Ace wrap legs  Because of lower pressures, his ARB and metoprolol are on hold  Down about 1 pound overall, but legs less swollen, less short of air  Likely home tomorrow with OP cardiology follow-up at [facility]    Acute kidney injury (H)  Initial elevation in creatinine, stable overall  Continue to monitor    Coronary artery disease involving native coronary artery of native heart without angina pectoris  Followed by [facility] cardiology with a number of stenting's and evaluations in the past  Continues on daily Plavix  Asymptomatic with no anginal symptoms  Echocardiogram showing persistent ischemic cardiomyopathy with no acute changes  Continue with outpatient cardiology management    Scrotal swelling  Due to fluid overload related to CHF  Better with diuresis           Diet: Combination Diet 2 gm NA Diet; Low Saturated Fat Diet  Fluid restriction 2000 ML FLUID    DVT Prophylaxis: Enoxaparin (Lovenox) SQ  Sinclair Catheter: Not present  Central Lines: None  Cardiac Monitoring: ACTIVE order. Indication: Acute decompensated heart failure (48 hours)  Code Status: Full Code      Disposition Plan    HOme tomorrow, consider [facility]     The patient's care was discussed with the Care Coordinator/, Patient and PT/OT.    Seamus Bauman MD  Hospitalist Service  [facility]  Securely message with the Vocera Web Console (learn more here)  Text page via  "[facility] Paging/Directory         Clinically Significant Risk Factors Present on Admission               # Obesity: Estimated body mass index is 33.49 kg/m  as calculated from the following:    Height as of this encounter: 1.6 m (5' 2.99\").    Weight as of this encounter: 85.7 kg (189 lb).        ______________________________________________________________________    Interval History   Feeling bettetr, less short of air. Swelling is better, especially in scrotum. Up with help. Lives with roommate, makes meals. Not sure if he can do that at this time    Data reviewed today: I reviewed all medications, new labs and imaging results over the last 24 hours. I personally reviewed no images or EKG's today.    Physical Exam   Vital Signs: Temp: 97.1  F (36.2  C) Temp src: Tympanic BP: 113/65 Pulse: 71   Resp: 20 SpO2: 90 % O2 Device: None (Room air)    Weight: 189 lbs 0 oz  Constitutional: awake, alert, cooperative, no apparent distress, and appears stated age  Respiratory: No increased work of breathing, good air exchange, clear to auscultation bilaterally, no crackles or wheezing  Cardiovascular: regular rate and rhythm  GI: normal bowel sounds, soft, non-distended and non-tender    Data   Recent Labs   Lab 07/26/22  0605 07/25/22  0546 07/24/22  0937 07/24/22  0000   WBC 5.7 6.0  --  7.5   HGB 12.0* 11.9*  --  12.1*   MCV 86 85  --  87    167  --  185    137  --  135   POTASSIUM 3.8 3.6  --  3.9   CHLORIDE 101 102  --  100   CO2 28 27  --  29   BUN 30* 26*  --  26*   CR 1.55* 1.42* 1.51* 1.59*   ANIONGAP 7 8  --  6   SARABJIT 8.9 8.8  --  9.1    91  --  100     No results found for this or any previous visit (from the past 24 hour(s)).  "

## 2022-07-26 NOTE — PROGRESS NOTES
SAFETY CHECKLIST  ID Bands and Risk clasps correct and in place (DNR, Fall risk, Allergy, Latex, Limb):  Yes  All Lines Reconciled and labeled correctly: Yes  Whiteboard updated:Yes  Environmental interventions (bed/chair alarm on, call light, side rails, restraints, sitter....): Yes  Verify Tele #: [id number]

## 2022-07-27 ENCOUNTER — APPOINTMENT (OUTPATIENT)
Dept: OCCUPATIONAL THERAPY | Facility: OTHER | Age: 77
DRG: 292 | End: 2022-07-27
Payer: MEDICARE

## 2022-07-27 ENCOUNTER — APPOINTMENT (OUTPATIENT)
Dept: PHYSICAL THERAPY | Facility: OTHER | Age: 77
DRG: 292 | End: 2022-07-27
Payer: MEDICARE

## 2022-07-27 VITALS
TEMPERATURE: 98.4 F | BODY MASS INDEX: 31.59 KG/M2 | WEIGHT: 178.3 LBS | HEIGHT: 63 IN | RESPIRATION RATE: 18 BRPM | HEART RATE: 80 BPM | OXYGEN SATURATION: 91 % | SYSTOLIC BLOOD PRESSURE: 103 MMHG | DIASTOLIC BLOOD PRESSURE: 60 MMHG

## 2022-07-27 LAB
ANION GAP SERPL CALCULATED.3IONS-SCNC: 8 MMOL/L (ref 3–14)
BUN SERPL-MCNC: 27 MG/DL (ref 7–25)
CALCIUM SERPL-MCNC: 8.3 MG/DL (ref 8.6–10.3)
CHLORIDE BLD-SCNC: 99 MMOL/L (ref 98–107)
CO2 SERPL-SCNC: 33 MMOL/L (ref 21–31)
CREAT SERPL-MCNC: 1.52 MG/DL (ref 0.7–1.3)
ERYTHROCYTE [DISTWIDTH] IN BLOOD BY AUTOMATED COUNT: 20.5 % (ref 10–15)
GFR SERPL CREATININE-BSD FRML MDRD: 47 ML/MIN/1.73M2
GLUCOSE BLD-MCNC: 96 MG/DL (ref 70–105)
HCT VFR BLD AUTO: 39.3 % (ref 40–53)
HGB BLD-MCNC: 12.4 G/DL (ref 13.3–17.7)
MCH RBC QN AUTO: 27.2 PG (ref 26.5–33)
MCHC RBC AUTO-ENTMCNC: 31.6 G/DL (ref 31.5–36.5)
MCV RBC AUTO: 86 FL (ref 78–100)
PLATELET # BLD AUTO: 164 10E3/UL (ref 150–450)
POTASSIUM BLD-SCNC: 3.9 MMOL/L (ref 3.5–5.1)
RBC # BLD AUTO: 4.56 10E6/UL (ref 4.4–5.9)
SODIUM SERPL-SCNC: 140 MMOL/L (ref 134–144)
WBC # BLD AUTO: 6.1 10E3/UL (ref 4–11)

## 2022-07-27 PROCEDURE — 97530 THERAPEUTIC ACTIVITIES: CPT | Mod: GP

## 2022-07-27 PROCEDURE — 36415 COLL VENOUS BLD VENIPUNCTURE: CPT | Performed by: INTERNAL MEDICINE

## 2022-07-27 PROCEDURE — 999N000104 HC STATISTIC NO CHARGE

## 2022-07-27 PROCEDURE — 250N000011 HC RX IP 250 OP 636: Performed by: INTERNAL MEDICINE

## 2022-07-27 PROCEDURE — 97535 SELF CARE MNGMENT TRAINING: CPT | Mod: GO | Performed by: OCCUPATIONAL THERAPIST

## 2022-07-27 PROCEDURE — 80048 BASIC METABOLIC PNL TOTAL CA: CPT | Performed by: INTERNAL MEDICINE

## 2022-07-27 PROCEDURE — 85014 HEMATOCRIT: CPT | Performed by: INTERNAL MEDICINE

## 2022-07-27 PROCEDURE — 99239 HOSP IP/OBS DSCHRG MGMT >30: CPT | Performed by: FAMILY MEDICINE

## 2022-07-27 PROCEDURE — 250N000013 HC RX MED GY IP 250 OP 250 PS 637: Performed by: INTERNAL MEDICINE

## 2022-07-27 PROCEDURE — 97116 GAIT TRAINING THERAPY: CPT | Mod: GP

## 2022-07-27 RX ORDER — METOPROLOL SUCCINATE 100 MG/1
100 TABLET, EXTENDED RELEASE ORAL DAILY
Qty: 30 TABLET | Refills: 0 | Status: SHIPPED | OUTPATIENT
Start: 2022-07-27 | End: 2022-07-27

## 2022-07-27 RX ORDER — FUROSEMIDE 40 MG
TABLET ORAL
Qty: 150 TABLET | Refills: 2 | Status: SHIPPED | OUTPATIENT
Start: 2022-07-27 | End: 2022-09-08

## 2022-07-27 RX ADMIN — ASPIRIN 81 MG 81 MG: 81 TABLET ORAL at 09:38

## 2022-07-27 RX ADMIN — CLOPIDOGREL BISULFATE 75 MG: 75 TABLET, FILM COATED ORAL at 09:38

## 2022-07-27 RX ADMIN — ENOXAPARIN SODIUM 40 MG: 40 INJECTION SUBCUTANEOUS at 09:37

## 2022-07-27 RX ADMIN — FUROSEMIDE 60 MG: 10 INJECTION, SOLUTION INTRAVENOUS at 09:37

## 2022-07-27 RX ADMIN — FUROSEMIDE 60 MG: 10 INJECTION, SOLUTION INTRAVENOUS at 02:58

## 2022-07-27 ASSESSMENT — ACTIVITIES OF DAILY LIVING (ADL)
ADLS_ACUITY_SCORE: 29
ADLS_ACUITY_SCORE: 33
ADLS_ACUITY_SCORE: 29
ADLS_ACUITY_SCORE: 29
ADLS_ACUITY_SCORE: 32
ADLS_ACUITY_SCORE: 32
ADLS_ACUITY_SCORE: 33
ADLS_ACUITY_SCORE: 33

## 2022-07-27 NOTE — PROGRESS NOTES
:     Patient is from home and lives with a friend who provides support. Patients friend will provide transportation at the time of discharge.     Patient is set to discharge with [facility] for PT/OT/RN services. Orders have been faxed to [facility].     GRECIA Welch on 7/27/2022 at 9:42 AM

## 2022-07-27 NOTE — PROGRESS NOTES
07/27/22 1500   Signing Clinician's Name / Credentials   Signing clinician's name / credentials Colten Costa MPT   Quick Adds   Rehab Discipline PT   Functional Transfer Training   Treatment Detail SBA with Fww   Gait Training   Symptoms Noted During/After Treatment (Gait Training) fatigue;shortness of breath   Distance in Feet (Required for LE Total Joints) 150   Treatment Detail ambulation in hallway   [place] Level (Gait Training) stand-by assist   Physical Assistance Level (Gait Training) supervision   Weight Bearing (Gait Training) full weight-bearing   Assistive Device (Gait Training) rolling walker   Pattern Analysis (Gait Training) 2-point gait   Gait Analysis Deviations decreased sandy   Impairments (Gait Analysis/Training) balance impaired;strength decreased   PT Discharge Planning   PT Discharge Recommendation (DC Rec) home with home care physical therapy   PT Rationale for DC Rec to promote increased functional activity/gait tolerance   PT Brief overview of current status SBA for all mobilities with increased activity tolerance using a Fww for gait   Additional Documentation   Rehab Comments patient fitted with new Fww   PT Plan patient discharging home with home care PT   Total Session Time   Total Session Time (minutes) 40 minutes

## 2022-07-27 NOTE — PLAN OF CARE
Patient able to discharge to home all questions asked and answered. Elizabeth Barrera RN on 7/27/2022 at 3:33 PM        Problem: Plan of Care - These are the overarching goals to be used throughout the patient stay.    Goal: Absence of Hospital-Acquired Illness or Injury  Intervention: Identify and Manage Fall Risk  Recent Flowsheet Documentation  Taken 7/27/2022 0700 by Elizabeth Barrera RN  Safety Promotion/Fall Prevention:   activity supervised   assistive device/personal items within reach   bed alarm on   clutter free environment maintained   fall prevention program maintained   nonskid shoes/slippers when out of bed   patient and family education   safety round/check completed   supervised activity   treat reversible contributory factors   treat underlying cause  Intervention: Prevent and Manage VTE (Venous Thromboembolism) Risk  Recent Flowsheet Documentation  Taken 7/27/2022 0700 by Elizabeth Barrera RN  VTE Prevention/Management: other (see comments)   Goal Outcome Evaluation:

## 2022-07-27 NOTE — TELEPHONE ENCOUNTER
Called [facility] [place] regarding establishing care with Hugo Hannah MD. Danica from [facility] was notified that Hugo Hannah MD will accept the patient into his practice.  Nette Courtney LPN........................7/27/2022  11:02 AM

## 2022-07-27 NOTE — PROGRESS NOTES
A               Admission:  I am responsible for any personal items that are not sent to the safe or pharmacy.  [facility] is not responsible for loss, theft or damage of any property in my possession.    Signature:  _________________________________ Date: _______  Time: _____                                              Staff Signature:  ____________________________ Date: ________  Time: _____      2nd Staff person, if patient is unable/unwilling to sign:    Signature: ________________________________ Date: ________  Time: _____     Discharge:  [facility] has returned all of my personal belongings:    Signature: _________________________________ Date: ________  Time: _____                                          Staff Signature:  ____________________________ Date: ________  Time: _____

## 2022-07-27 NOTE — PLAN OF CARE
Physical Therapy Discharge Summary    Reason for therapy discharge:    Discharged to home with home therapy.    Progress towards therapy goal(s). See goals on Care Plan in [facility] electronic health record for goal details.  Goals met    Therapy recommendation(s):    Continued therapy is recommended.  Rationale/Recommendations:  to promote strength, stability and return to safe mobility within home environment.    Goal Outcome Evaluation:

## 2022-07-27 NOTE — PLAN OF CARE
Patient voided 1200 mL via purewick and had one large incontinence.  Lungs are clear and diminished in bases bilaterally.  Patient had one time where he felt that he was feeling short of breath.  Sp02 >92%, other VSS.  Dr. Krause was updated with no new orders.  Kept patient on continuous pulse oximetry to monitor and was found to have brief periods where he would dip into high 80s and then back up in 90s.  Placed on 1L while sleeping.  May be able to come off oxygen when awake.  Patient has denied any pain and slept throughout most of night.      Problem: Plan of Care - These are the overarching goals to be used throughout the patient stay.    Goal: Absence of Hospital-Acquired Illness or Injury  Intervention: Identify and Manage Fall Risk  Recent Flowsheet Documentation  Taken 7/27/2022 0332 by Angelina Blanco RN  Safety Promotion/Fall Prevention:   activity supervised   assistive device/personal items within reach   bed alarm on   clutter free environment maintained   fall prevention program maintained   nonskid shoes/slippers when out of bed   patient and family education   safety round/check completed   supervised activity   treat reversible contributory factors   treat underlying cause  Taken 7/26/2022 2055 by Angelina Blanco RN  Safety Promotion/Fall Prevention:   activity supervised   assistive device/personal items within reach   bed alarm on   clutter free environment maintained   fall prevention program maintained   nonskid shoes/slippers when out of bed   patient and family education   safety round/check completed   supervised activity   treat reversible contributory factors   treat underlying cause  Intervention: Prevent and Manage VTE (Venous Thromboembolism) Risk  Recent Flowsheet Documentation  Taken 7/27/2022 0332 by Angelina Blanco RN  VTE Prevention/Management: (plavix) other (see comments)  Taken 7/26/2022 2055 by Angelina Blanco RN  VTE Prevention/Management: (plavix) other  (see comments)     Problem: Heart Failure Comorbidity  Goal: Maintenance of Heart Failure Symptom Control  Intervention: Maintain Heart Failure-Management  Recent Flowsheet Documentation  Taken 7/27/2022 0332 by Angelina Blanco RN  Medication Review/Management:   medications reviewed   high-risk medications identified  Taken 7/26/2022 2055 by Angelina Blanco, RN  Medication Review/Management:   medications reviewed   high-risk medications identified   Goal Outcome Evaluation:

## 2022-07-27 NOTE — PROGRESS NOTES
SAFETY CHECKLIST  ID Bands and Risk clasps correct and in place (DNR, Fall risk, Allergy, Latex, Limb):  Yes  All Lines Reconciled and labeled correctly: Yes  Whiteboard updated:Yes  Environmental interventions (bed/chair alarm on, call light, side rails, restraints, sitter....): Yes     Verify Tele #: [phone]

## 2022-07-27 NOTE — PROGRESS NOTES
Patient able to discharge to home with home care. All questions asked and answered. Elizabeth Barrera RN on 7/27/2022 at 3:30 PM

## 2022-07-27 NOTE — PHARMACY - DISCHARGE MEDICATION RECONCILIATION AND EDUCATION
Pharmacy:  Discharge Counseling and Medication Reconciliation    Waqas Ferro  [address]   [place] [id number] [phone] (home)   77 year old male  PCP: Hugo Hannah    Allergies: Patient has no known allergies.    Discharge Counseling:    Pharmacist met with patient today to review the medication portion of the After Visit Summary (with an emphasis on NEW medications) and to address patient's questions/concerns.    Summary of Education: Counseled patient on dose increase of Furosemide, as well as discontinuing Losartan and Metoprolol for now until he follows up with cardiology, due to lower blood pressures.    Materials Provided:  MedCounselor sheets printed from Clinical Pharmacology on: N/A- pt not prescribed any new medications at discharge.    Discharge Medication Reconciliation:    It has been determined that the patient has an adequate supply of medications available or which can be obtained from the patient's preferred pharmacy. A med list was NOT faxed to pharmacy, as discontinuation of Metoprolol and Losartan are likely temporary, with hopes that they will be restarted after patient follows up with PCP/Cardiology.    Thank you for the consult.    Tex Lauren [facility]........July 27, 2022 12:58 PM

## 2022-07-27 NOTE — DISCHARGE SUMMARY
[facility]  Hospitalist Discharge Summary      Date of Admission:  7/23/2022  Date of Discharge:  7/27/2022  Discharging Provider: Seamus Bauman MD  Discharge Service: Hospitalist Service    Discharge Diagnoses   Principal Problem:    Acute on chronic congestive heart failure, unspecified heart failure type (H)  Active Problems:    Scrotal swelling    Acute kidney injury (H)    Coronary artery disease involving native coronary artery of native heart without angina pectoris        Follow-ups Needed After Discharge   Follow-up Appointments     Follow-up and recommended labs and tests       Follow up with primary care provider, Hugo Hannah, within 7 days for   hospital follow- up.  The following labs/tests are recommended: creatinine  Has follow-up with cardiology in near future.             Unresulted Labs Ordered in the Past 30 Days of this Admission     No orders found from 6/23/2022 to 7/24/2022.      These results will be followed up by Dr. Hannah    Discharge Disposition   Admited to home care:   Agency: [facility]  Discharged to home  Condition at discharge: Satisfactory      Hospital Course   Acute on chronic congestive heart failure, unspecified heart failure type (H)  Presenting with fluid retention in legs with scrotal swelling  Known previous history of ischemic cardiomyopathy with previous echo showing EF less than 30%  Echocardiogram today showing EF of approximately 22%  Diuresing with Lasix, slow improvement of swelling.  Not requiring supplemental oxygen  Continue diuresis, will Ace wrap legs  Because of lower pressures, his ARB and metoprolol were held  Down about 12 pound overall,  legs less swollen, less short of air  Home today with OP cardiology follow-up at [facility], discharge on slightly higher dosing of lasix, 120 mg in AM, 80 mg in PM until cards follow-up  At discharge holding dose of metoprolol and losartan until seen by primary care provider  [facility]  ordered with daily weights, and PT/OT ordered    Acute kidney injury (H)  Initial elevation in creatinine, stable overall  Continue to monitor    Coronary artery disease involving native coronary artery of native heart without angina pectoris  [facility] with a number of stenting's and evaluations in the past  Continues on daily Plavix  Asymptomatic with no anginal symptoms  Echocardiogram showing persistent ischemic cardiomyopathy with no acute changes  Continue with outpatient cardiology management    Scrotal swelling  Due to fluid overload related to CHF  Better with diuresis    Documentation of Face to Face and Certification for Home Health Services    I certify that patient: Waqas Ferro is under my care and that I, or a nurse practitioner or physician's assistant working with me, had a face-to-face encounter that meets the physician face-to-face encounter requirements with this patient on: 7/27/2022.    This encounter with the patient was in whole, or in part, for the following medical condition, which is the primary reason for home health care: Congestive heart failure.    I certify that, based on my findings, the following services are medically necessary home health services: Nursing, Occupational Therapy and Physical Therapy.    My clinical findings support the need for the above services because: Nurse is needed: To assess CHF and fluid status after changes in medications or other medical regimen.., Occupational Therapy Services are needed to assess and treat cognitive ability and address ADL safety due to impairment in cognition. and Physical Therapy Services are needed to assess and treat the following functional impairments: strength, with concerns for falls.    Further, I certify that my clinical findings support that this patient is homebound (i.e. absences from home require considerable and taxing effort and are for medical reasons or [employer] services or infrequently or of  short duration when for other reasons) because: Leaving home is medically contraindicated for the following reason(s): Dyspnea on exertion that makes it so they cannot leave their home for needed services without clinical deterioration...    Based on the above findings. I certify that this patient is confined to the home and needs intermittent skilled nursing care, physical therapy and/or speech therapy.  The patient is under my care, and I have initiated the establishment of the plan of care.  This patient will be followed by a physician who will periodically review the plan of care.  Physician/Provider to provide follow up care: Hugo Hannah    Attending hospital physician (the Medicare certified [place] provider): Seamus Bauman MD  Physician Signature: See electronic signature associated with these discharge orders.  Date: 7/27/2022      Consultations This Hospital Stay   OCCUPATIONAL THERAPY ADULT IP CONSULT  NUTRITION SERVICES ADULT IP CONSULT  CARE MANAGEMENT / SOCIAL WORK IP CONSULT  PHYSICAL THERAPY ADULT IP CONSULT  OCCUPATIONAL THERAPY ADULT IP CONSULT  SOCIAL WORK IP CONSULT    Code Status   Full Code    Time Spent on this Encounter   I, Seamus Bauman MD, personally saw the patient today and spent greater than 30 minutes discharging this patient.       Seamus Bauman MD  [facility]  [address]  [place] [address]  Phone: [phone]  Fax: [phone]  ______________________________________________________________________    Physical Exam   Vital Signs: Temp: 98.4  F (36.9  C) Temp src: Tympanic BP: 103/60 Pulse: 80   Resp: 18 SpO2: 91 % O2 Device: None (Room air) Oxygen Delivery: 1 LPM  Weight: 178 lbs 4.8 oz  Constitutional: awake, alert, cooperative, no apparent distress, and appears stated age  Respiratory: No increased work of breathing, good air exchange, clear to auscultation bilaterally, no crackles or wheezing  Cardiovascular: regular  rate and rhythm  GI: normal bowel sounds, soft and non-distended  Genitounirinary: scrotum with some swelling, improved over admit       Primary Care Physician   Hugo Hannah    Discharge Orders      Home Care Referral      Reason for your hospital stay    Presenting with increased fluid retention in legs and scrotum associated with congestive heart failure     Follow-up and recommended labs and tests     Follow up with primary care provider, Hugo Hannah, within 7 days for hospital follow- up.  The following labs/tests are recommended: creatinine  Has follow-up with cardiology in near future.     Activity    Your activity upon discharge: activity as tolerated     Diet    Follow this diet upon discharge: Orders Placed This Encounter      Fluid restriction 2000 ML FLUID      Combination Diet 2 gm NA Diet; Low Saturated Fat Diet       Significant Results and Procedures   Most Recent 3 CBC's:  Recent Labs   Lab Test 07/27/22  0526 07/26/22  0605 07/25/22  0546   WBC 6.1 5.7 6.0   HGB 12.4* 12.0* 11.9*   MCV 86 86 85    170 167     Most Recent 3 BMP's:  Recent Labs   Lab Test 07/27/22  0526 07/26/22  0605 07/25/22  0546    136 137   POTASSIUM 3.9 3.8 3.6   CHLORIDE 99 101 102   CO2 33* 28 27   BUN 27* 30* 26*   CR 1.52* 1.55* 1.42*   ANIONGAP 8 7 8   SARABJIT 8.3* 8.9 8.8   GLC 96 100 91     Most Recent 3 BNP's:  Recent Labs   Lab Test 07/24/22  0005 12/06/18  2101   NTBNPI 1,836* 977*   ,   Results for orders placed or performed during the hospital encounter of 07/23/22   XR Chest 2 Views    Narrative    PROCEDURE INFORMATION:   Exam: XR Chest   Exam date and time: 7/24/2022 1:52 AM   Age: 77 years old   Clinical indication: Shortness of breath; Additional info: Volume overload, SOB     TECHNIQUE:   Imaging protocol: Radiologic exam of the chest.   Views: 2 views.     COMPARISON:   CR XR CHEST PORT 1 VW 12/6/2018 9:19 PM     FINDINGS:   Lungs: Left lung is well expanded. Eventration of the right  hemidiaphragm.   Indistinct pulmonary vasculature. The linear opacities in the right lower lung   zone.   Pleural spaces: Blunting of the bilateral costophrenic sulci. No pneumothorax.   Heart/Mediastinum: Cardiomediastinal silhouette is prominent in size and stable   in contour.   Bones/joints: Osseous structures are stable.       Impression    IMPRESSION:   1. Prominence of the cardiac silhouette which may reflect cardiomegaly and/or   pericardial effusion.   2. Findings compatible with pulmonary interstitial edema with bilateral pleural   effusions, right greater than left with adjacent airspace disease. Cannot   exclude a loculated fluid component on the right.   3. Coarse pericardial calcifications cannot exclude sequelae of prior trauma,   surgery, or infection.     THIS DOCUMENT HAS BEEN ELECTRONICALLY SIGNED BY ANGELA ANDREW MD   Echocardiogram Complete     Value    Biplane LVEF 22%    Narrative    [id number]  [id number]  [id number]  [id number]^JOSHUA^KIMBERLY^JAVAD     [facility]  [address] [place] [address]     Name: BETHANIE GODWIN  MRN: [id number]  : 1945  Study Date: 2022 07:36 AM  Age: 77 yrs  Gender: Male  Patient Location: [facility]  Reason For Study: Heart Failure  Ordering Physician: KIMBERLY LEWIS  Performed By: Payton Willis RDCS, RVT     BSA: 1.9 m2  Height: 62 in  Weight: 190 lb  HR: 71  BP: 95/63 mmHg  ______________________________________________________________________________  Procedure  Complete Portable Echo Adult. Contrast Definity. Definity (NDC #53755-790-95)  given intravenously. Patient was given 4ml mixture of 1.5ml Definity and 8.5ml  saline. 6 ml wasted. Definity Lot # [id number] .  ______________________________________________________________________________  Interpretation Summary  Biplane LVEF is 22%.  Akinetic apical segments.  Right ventricular function, chamber size, wall motion, and thickness are  normal.  Right ventricular systolic  pressure is 47mmHg above the right atrial pressure.  IVC diameter >2.1 cm collapsing <50% with sniff suggests a high RA pressure  estimated at 15 mmHg or greater.  No pericardial effusion is present.  There is no prior study for direct comparison.  ______________________________________________________________________________  Left Ventricle  Biplane LVEF is 22%. Left ventricular wall thickness is normal. Left  ventricular size is normal. Left ventricular diastolic function is  indeterminate. Akinetic apical segments.     Right Ventricle  Right ventricular function, chamber size, wall motion, and thickness are  normal.     Atria  Moderate left atrial enlargement is present. Mild to moderate right atrial  enlargement is present. The atrial septum is intact as assessed by color  Doppler .     Mitral Valve  The mitral valve is normal. Trace to mild mitral insufficiency is present.     Aortic Valve  Mild aortic valve calcification is present. The valve leaflets are not well  visualized.     Tricuspid Valve  The tricuspid valve is normal. Mild tricuspid insufficiency is present. Right  ventricular systolic pressure is 47mmHg above the right atrial pressure.     Pulmonic Valve  The pulmonic valve is normal.     Vessels  The thoracic aorta is normal. The pulmonary artery and bifurcation cannot be  assessed. IVC diameter >2.1 cm collapsing <50% with sniff suggests a high RA  pressure estimated at 15 mmHg or greater.     Pericardium  No pericardial effusion is present.     Compared to Previous Study  There is no prior study for direct comparison.     ______________________________________________________________________________  MMode/2D Measurements & Calculations  IVSd: 0.61 cm  LVIDd: 5.0 cm  LVIDs: 4.6 cm  LVPWd: 1.0 cm  FS: 6.9 %     LV mass(C)d: 134.9 grams  LV mass(C)dI: 72.1 grams/m2  Ao root diam: 3.4 cm  asc Aorta Diam: 3.2 cm  LVOT diam: 2.3 cm  LVOT area: 4.2 cm2  LA Volume (BP): 82.6 ml  LA Volume Index (BP):  44.2 ml/m2  RWT: 0.40     Doppler Measurements & Calculations  MV E max anibal: 87.3 cm/sec  MV A max anibal: 27.2 cm/sec  MV E/A: 3.2     MV dec slope: 594.0 cm/sec2  MV dec time: 0.15 sec  Ao V2 max: 81.4 cm/sec  Ao max PG: 3.0 mmHg  DOYLE(V,D): 4.0 cm2  LV V1 max P.4 mmHg  LV V1 max: 77.6 cm/sec  LV V1 VTI: 14.5 cm  SV(LVOT): 60.2 ml  SI(LVOT): 32.2 ml/m2  PA acc time: 0.15 sec  TR max anibal: 341.0 cm/sec  TR max P.5 mmHg  AV Anibal Ratio (DI): 0.95  Lateral E/e': 11.0     ______________________________________________________________________________  Report approved by: Torsten Montgomery 2022 10:45 AM               Discharge Medications   Current Discharge Medication List      CONTINUE these medications which have CHANGED    Details   furosemide (LASIX) 40 MG tablet Take 3 tabs, 120 mg in AM and 2 tabs, 80 mg in PM  Qty: 150 tablet, Refills: 2    Associated Diagnoses: Acute on chronic systolic congestive heart failure (H)         CONTINUE these medications which have NOT CHANGED    Details   aspirin (ASA) 81 MG chewable tablet Take 81 mg by mouth daily  Refills: 2      atorvastatin (LIPITOR) 40 MG tablet Take 40 mg by mouth At Bedtime  Refills: 2      clopidogrel (PLAVIX) 75 MG tablet Take 75 mg by mouth daily  Refills: 2         STOP taking these medications       losartan (COZAAR) 25 MG tablet Comments:   Reason for Stopping:         metoprolol succinate ER (TOPROL XL) 100 MG 24 hr tablet Comments:   Reason for Stopping:             Allergies   No Known Allergies

## 2022-07-27 NOTE — PLAN OF CARE
"Patient is alert and orientated x 4. Patient makes needs known and uses call light appropriately. Has ongoing edema to lower extremities, scrotum and penis. Denies pain on assessment, but with william cares reports tenderness. Intermittent nonproductive cough. Ambulates with SBA in room. Manwick discontinued.   Problem: Plan of Care - These are the overarching goals to be used throughout the patient stay.    Goal: Plan of Care Review/Shift Note  Description: The Plan of Care Review/Shift note should be completed every shift.  The Outcome Evaluation is a brief statement about your assessment that the patient is improving, declining, or no change.  This information will be displayed automatically on your shift note.  Outcome: Ongoing, Progressing  Goal: Patient-Specific Goal (Individualized)  Description: You can add care plan individualizations to a care plan. Examples of Individualization might be:  \"Parent requests to be called daily at 9am for status\", \"I have a hard time hearing out of my right ear\", or \"Do not touch me to wake me up as it startles me\".  Outcome: Ongoing, Progressing  Goal: Absence of Hospital-Acquired Illness or Injury  Outcome: Ongoing, Progressing  Intervention: Identify and Manage Fall Risk  Recent Flowsheet Documentation  Taken 7/26/2022 1551 by Pedro Arana, RN  Safety Promotion/Fall Prevention:   safety round/check completed   patient and family education   nonskid shoes/slippers when out of bed   clutter free environment maintained  Taken 7/26/2022 0902 by Pedro Arana, RN  Safety Promotion/Fall Prevention:   safety round/check completed   patient and family education   nonskid shoes/slippers when out of bed   clutter free environment maintained  Intervention: Prevent and Manage VTE (Venous Thromboembolism) Risk  Recent Flowsheet Documentation  Taken 7/26/2022 1551 by Pedro Arana, RN  VTE Prevention/Management: SCDs (sequential compression devices) off  Taken 7/26/2022 0902 " by Pedro Arana, RN  VTE Prevention/Management: SCDs (sequential compression devices) off  Taken 7/26/2022 0852 by Pedro Arana, RN  Activity Management: activity adjusted per tolerance  Goal: Optimal Comfort and Wellbeing  Outcome: Ongoing, Progressing  Goal: Readiness for Transition of Care  Outcome: Ongoing, Progressing     Problem: Heart Failure Comorbidity  Goal: Maintenance of Heart Failure Symptom Control  Outcome: Ongoing, Progressing  Intervention: Maintain Heart Failure-Management  Recent Flowsheet Documentation  Taken 7/26/2022 1551 by Pedro Arana, RN  Medication Review/Management:   medications reviewed   high-risk medications identified  Taken 7/26/2022 0902 by Pedro Arana, RN  Medication Review/Management:   medications reviewed   high-risk medications identified     Problem: Fluid Volume Excess  Goal: Fluid Balance  Outcome: Ongoing, Progressing   Goal Outcome Evaluation:

## 2022-07-27 NOTE — PROGRESS NOTES
SAFETY CHECKLIST  ID Bands and Risk clasps correct and in place (DNR, Fall risk, Allergy, Latex, Limb):  Yes  All Lines Reconciled and labeled correctly: Yes  Whiteboard updated:Yes  Environmental interventions (bed/chair alarm on, call light, side rails, restraints, sitter....): Yes   Tele:#13    Elizabeth Barrera RN on 7/27/2022 at 7:17 AM

## 2022-07-28 ENCOUNTER — TELEPHONE (OUTPATIENT)
Dept: FAMILY MEDICINE | Facility: OTHER | Age: 77
End: 2022-07-28

## 2022-07-28 ENCOUNTER — PATIENT OUTREACH (OUTPATIENT)
Dept: CARE COORDINATION | Facility: CLINIC | Age: 77
End: 2022-07-28

## 2022-07-28 PROCEDURE — G0180 MD CERTIFICATION HHA PATIENT: HCPCS | Performed by: FAMILY MEDICINE

## 2022-07-28 NOTE — TELEPHONE ENCOUNTER
[facility]-Home health care is looking for verbal orders Skilled Nursing for 1 time a week for 4 weeks     Please call and advise  Thank You    Macey Serrato on 7/28/2022 at 2:17 PM

## 2022-07-28 NOTE — TELEPHONE ENCOUNTER
Called and let Pearl know that [facility] has not seen this patient since 2019 and he will need to be seen. He does have an appt with  on 08/01/2022 and the orders can be obtained that appt. .Kajal Red LPN .......................7/28/2022  2:49 PM

## 2022-07-28 NOTE — PROGRESS NOTES
"Transitional Care Management Phone Call  Verified patient's name/.      Summary of hospitalization:  [facility] discharge summary reviewed  DISCHARGE DIAGNOSIS:   Acute on chronic congestive heart failure, unspecified heart failure type (H)  Active Problems:    Scrotal swelling    Acute kidney injury (H)    Coronary artery disease involving native coronary artery of native heart without angina pectoris       DATE OF DISCHARGE: 2022    Diagnostic Tests/Treatments reviewed.  Follow up needed:  Follow up with primary care provider, Hugo Hannah, within 7 days for   hospital follow- up.  The following labs/tests are recommended: creatinine  Has follow-up with cardiology in near future.        Scheduled with [facility] .     Post Discharge Medication Reconciliation: discharge medications reconciled, continue medications without change.  Medications reviewed by: by myself    Problems taking medications regularly:  None  Problems adhering to non-medication therapy:  None    Other Healthcare Providers Involved in Patient s Care:         Homecare    Update since discharge: improved.   Homecare nurse was out, planning once a week visits.   Plans to  rx tomorrow, Reviewed Lasix.   Pt denies sob, cp, edema in LE. When asked about swelling in scrotal area, pt no-the nurse was out.   Pt states he plans to contact his pharmacy for refill of Nitro. \"Last bottle was 2 1/2 years old.\"Pt denies questions at this time.         Plan of care communicated with patient  Just a friendly reminder that you appointment is   Next 5 appointments (look out 90 days)    Aug 01, 2022 10:20 AM  Office Visit with Phyllis Diaz,   [facility] [address]  [place] [address] [phone]      .  We encourage you to keep this appointment.    Please remember to bring all of your pills in their bottles (including any vitamins " or over the counter pills) with you to your appointment.   The patient indicates understanding of these issues and agrees with the plan of care.   Yes, plans to follow plan of care and keep the scheduled appointment.     Was the patient contacted within the 2 business days or other approved timeframe?  Yes  Was the Medication reconciliation and management done since the patient was discharged? Yes  Brenda Dick RN ,....................  7/28/2022   4:17 PM

## 2022-07-29 DIAGNOSIS — I25.10 CORONARY ARTERY DISEASE INVOLVING NATIVE CORONARY ARTERY OF NATIVE HEART WITHOUT ANGINA PECTORIS: Primary | ICD-10-CM

## 2022-07-29 RX ORDER — NITROGLYCERIN 0.4 MG/1
TABLET SUBLINGUAL
Qty: 20 TABLET | Refills: 1 | Status: SHIPPED | OUTPATIENT
Start: 2022-07-29 | End: 2022-09-08

## 2022-07-29 NOTE — TELEPHONE ENCOUNTER
Pt is requesting refill  sent Rx request for the following:     Requested Prescriptions   Pending Prescriptions Disp Refills     nitroGLYcerin (NITROSTAT) 0.4 MG sublingual tablet 50 tablet 1     Sig: For chest pain place 1 tablet under the tongue every 5 minutes for 3 doses. If symptoms persist 5 minutes after 1st dose call 911.     Last Prescription Date:   Note on file- pt states  prescription.    Last Fill Qty/Refills:          , R-     Last Office Visit:             2019  Future Office visit:           2022    Routing refill request to provider for review/approval because:  Medication is reported/historical  Pt states bottle is 2 1/2 years old.     Unable to complete prescription refill per RN Medication Refill Policy.................... Brenda Dick RN ....................  2022   8:37 AM

## 2022-07-29 NOTE — TELEPHONE ENCOUNTER
Patient has hospital follow up scheduled with you on Monday. Rachid is requesting verbal homecare orders so they can get out to see him.    Lety Serra LPN on 7/29/2022 at 10:36 AM

## 2022-08-01 ENCOUNTER — OFFICE VISIT (OUTPATIENT)
Dept: FAMILY MEDICINE | Facility: OTHER | Age: 77
End: 2022-08-01
Attending: FAMILY MEDICINE
Payer: MEDICARE

## 2022-08-01 VITALS
DIASTOLIC BLOOD PRESSURE: 70 MMHG | HEIGHT: 63 IN | WEIGHT: 165 LBS | SYSTOLIC BLOOD PRESSURE: 110 MMHG | RESPIRATION RATE: 24 BRPM | HEART RATE: 102 BPM | TEMPERATURE: 98.3 F | OXYGEN SATURATION: 97 % | BODY MASS INDEX: 29.23 KG/M2

## 2022-08-01 DIAGNOSIS — N50.89 SCROTAL SWELLING: ICD-10-CM

## 2022-08-01 DIAGNOSIS — Z11.59 NEED FOR HEPATITIS C SCREENING TEST: ICD-10-CM

## 2022-08-01 DIAGNOSIS — N17.9 ACUTE KIDNEY INJURY (H): ICD-10-CM

## 2022-08-01 DIAGNOSIS — I50.23 ACUTE ON CHRONIC SYSTOLIC CONGESTIVE HEART FAILURE (H): ICD-10-CM

## 2022-08-01 DIAGNOSIS — I25.10 CORONARY ARTERY DISEASE INVOLVING NATIVE CORONARY ARTERY OF NATIVE HEART WITHOUT ANGINA PECTORIS: Primary | ICD-10-CM

## 2022-08-01 LAB
ALT SERPL W P-5'-P-CCNC: 27 U/L (ref 7–52)
ANION GAP SERPL CALCULATED.3IONS-SCNC: 6 MMOL/L (ref 3–14)
BUN SERPL-MCNC: 15 MG/DL (ref 7–25)
CALCIUM SERPL-MCNC: 9.8 MG/DL (ref 8.6–10.3)
CHLORIDE BLD-SCNC: 100 MMOL/L (ref 98–107)
CHOLEST SERPL-MCNC: 57 MG/DL
CO2 SERPL-SCNC: 31 MMOL/L (ref 21–31)
CREAT SERPL-MCNC: 1.28 MG/DL (ref 0.7–1.3)
FASTING STATUS PATIENT QL REPORTED: ABNORMAL
GFR SERPL CREATININE-BSD FRML MDRD: 58 ML/MIN/1.73M2
GLUCOSE BLD-MCNC: 105 MG/DL (ref 70–105)
HDLC SERPL-MCNC: 22 MG/DL (ref 23–92)
LDLC SERPL CALC-MCNC: 24 MG/DL
NONHDLC SERPL-MCNC: 35 MG/DL
POTASSIUM BLD-SCNC: 3.6 MMOL/L (ref 3.5–5.1)
SODIUM SERPL-SCNC: 137 MMOL/L (ref 134–144)
TRIGL SERPL-MCNC: 54 MG/DL
TSH SERPL DL<=0.005 MIU/L-ACNC: 3.66 MU/L (ref 0.4–4)

## 2022-08-01 PROCEDURE — 36415 COLL VENOUS BLD VENIPUNCTURE: CPT | Mod: ZL | Performed by: FAMILY MEDICINE

## 2022-08-01 PROCEDURE — 80061 LIPID PANEL: CPT | Mod: ZL | Performed by: FAMILY MEDICINE

## 2022-08-01 PROCEDURE — G0463 HOSPITAL OUTPT CLINIC VISIT: HCPCS

## 2022-08-01 PROCEDURE — 84460 ALANINE AMINO (ALT) (SGPT): CPT | Mod: ZL | Performed by: FAMILY MEDICINE

## 2022-08-01 PROCEDURE — 86803 HEPATITIS C AB TEST: CPT | Mod: ZL | Performed by: FAMILY MEDICINE

## 2022-08-01 PROCEDURE — 80048 BASIC METABOLIC PNL TOTAL CA: CPT | Mod: ZL | Performed by: FAMILY MEDICINE

## 2022-08-01 PROCEDURE — 99496 TRANSJ CARE MGMT HIGH F2F 7D: CPT | Performed by: FAMILY MEDICINE

## 2022-08-01 PROCEDURE — 84443 ASSAY THYROID STIM HORMONE: CPT | Mod: ZL | Performed by: FAMILY MEDICINE

## 2022-08-01 RX ORDER — NITROGLYCERIN 0.4 MG/1
TABLET SUBLINGUAL
Qty: 20 TABLET | Refills: 1 | Status: SHIPPED | OUTPATIENT
Start: 2022-08-01 | End: 2024-02-05

## 2022-08-01 ASSESSMENT — ENCOUNTER SYMPTOMS
DIARRHEA: 0
ABDOMINAL PAIN: 0
CONSTIPATION: 0
FEVER: 0
VOMITING: 0
PALPITATIONS: 0
DIFFICULTY URINATING: 0
NAUSEA: 0

## 2022-08-01 ASSESSMENT — PAIN SCALES - GENERAL: PAINLEVEL: MODERATE PAIN (5)

## 2022-08-01 NOTE — PROGRESS NOTES
Assessment & Plan     Coronary artery disease involving native coronary artery of native heart without angina pectoris  No chest pain since hospital discharge; however, he needs a new prescription for nitroglycerin as his current is .  New prescription provided.  Check lipid panel and ALT for medication monitoring.  Follow-up with cardiology as scheduled.  - nitroGLYcerin (NITROSTAT) 0.4 MG sublingual tablet; For chest pain place 1 tablet under the tongue every 5 minutes for 3 doses. If symptoms persist 5 minutes after 1st dose call 911.  - ALT (SGPT)  - Lipid Panel    Acute on chronic systolic congestive heart failure (H)  Stable.  Continue current medication regimen without adjustment.  DME order for compression stockings.  Check TSH.  - TSH Reflex GH  - Compression Sleeve/Stocking Order for DME - ONLY FOR DME    Scrotal swelling  As above.    Acute kidney injury (H)  Resolved.  - Basic Metabolic Panel    Need for hepatitis C screening test  - Hepatitis C Screen Reflex to HCV RNA Quant and Genotype    Phyllis Diaz, [facility] [place]   Waqas is a 77 year old, presenting for the following health issues:  Hospital F/U (CHF)    History of Present Illness       Heart Failure:  He presents for follow up of heart failure. He is experiencing shortness of breath No and with activity only, He is experiencing lower extremity edema which is same as usual. He denies orthopenea and is not coughing at night. Patient is checking weight daily. He has recently had a weight decrease. He has side effects from medications including fatigue. He has has a medical visit for heart failure 1 time since the last visit.    Vascular Disease:  He presents for follow up of vascular disease.  He takes daily aspirin.    He eats 4 or more servings of fruits and vegetables daily.He consumes 0 sweetened beverage(s) daily.He exercises with enough effort to increase his heart rate 9 or less minutes per day.   "He exercises with enough effort to increase his heart rate 3 or less days per week.   He is taking medications regularly.     Hospital Follow-up Visit:    Hospital/Nursing Home/IP Rehab Facility: [facility]  Date of Admission: 7/23/22  Date of Discharge: 7/27/22  Reason(s) for Admission: CHF    Was your hospitalization related to COVID-19? No   Problems taking medications regularly:  None  Medication changes since discharge: None  Problems adhering to non-medication therapy:  None    Summary of hospitalization:  [facility] discharge summary reviewed  Diagnostic Tests/Treatments reviewed.  Follow up needed: Cardiology  Other Healthcare Providers Involved in Patient s Care:         None  Update since discharge: stable.     Plan of care communicated with patient and caregiver    Acute on chronic congestive heart failure, Scrotal swelling:  He reports that his baseline weight is around 161 lbs.  He is 165 lbs in clinic today.  His symptoms have improved with increased diuresis.  He denies any complications or adverse effects from higher Furosemide dose.  He has held Losartan and Metoprolol as instructed.  Scrotal swelling has resolved.  He denies shortness of breath, orthopnea, and PND.    Coronary artery disease:  He has continued taking ASA, Plavix, and Atorvastatin without adjustments.  He denies any chest pain.  He reports that he has follow-up with his cardiologist on 8/16.  [facility]    Acute kidney injury:  Initially elevated while hospitalized but stable overall.    Review of Systems   Constitutional: Negative for fever.   Cardiovascular: Negative for palpitations.   Gastrointestinal: Negative for abdominal pain, constipation, diarrhea, nausea and vomiting.   Genitourinary: Negative for difficulty urinating.          Objective    /70   Pulse 102   Temp 98.3  F (36.8  C) (Tympanic)   Resp 24   Ht 1.6 m (5' 3\")   Wt " 74.8 kg (165 lb)   SpO2 97%   BMI 29.23 kg/m    Body mass index is 29.23 kg/m .  Physical Exam  Constitutional:       General: He is not in acute distress.     Appearance: Normal appearance. He is not ill-appearing.   Cardiovascular:      Rate and Rhythm: Normal rate and regular rhythm.      Heart sounds: No murmur heard.    No friction rub. No gallop.   Pulmonary:      Effort: Pulmonary effort is normal.      Breath sounds: Normal breath sounds. No wheezing, rhonchi or rales.   Abdominal:      General: Bowel sounds are normal.      Palpations: Abdomen is soft.      Tenderness: There is no abdominal tenderness. There is no guarding or rebound.   Musculoskeletal:      Comments: Trace-1+ pitting edema lower extremity bilaterally.   Neurological:      Mental Status: He is alert.   Psychiatric:         Mood and Affect: Mood normal.

## 2022-08-01 NOTE — NURSING NOTE
"Chief Complaint   Patient presents with     Hospital F/U     CHF         Initial /70   Pulse 102   Temp 98.3  F (36.8  C) (Tympanic)   Resp 24   Ht 1.6 m (5' 3\")   Wt 74.8 kg (165 lb)   SpO2 97%   BMI 29.23 kg/m   Estimated body mass index is 29.23 kg/m  as calculated from the following:    Height as of this encounter: 1.6 m (5' 3\").    Weight as of this encounter: 74.8 kg (165 lb).         Norma J. Gosselin, YAS   "

## 2022-08-02 LAB — HCV AB SERPL QL IA: NONREACTIVE

## 2022-08-04 ENCOUNTER — TELEPHONE (OUTPATIENT)
Dept: FAMILY MEDICINE | Facility: OTHER | Age: 77
End: 2022-08-04

## 2022-08-04 NOTE — TELEPHONE ENCOUNTER
Spoke with Pearl and gave her the information from Dr Diaz.  Norma J. Gosselin, LPN .......  8/4/2022  10:00 AM

## 2022-08-10 ENCOUNTER — TELEPHONE (OUTPATIENT)
Dept: FAMILY MEDICINE | Facility: OTHER | Age: 77
End: 2022-08-10

## 2022-08-10 DIAGNOSIS — N50.89 SCROTAL SWELLING: ICD-10-CM

## 2022-08-10 DIAGNOSIS — I25.10 CORONARY ARTERY DISEASE INVOLVING NATIVE CORONARY ARTERY OF NATIVE HEART WITHOUT ANGINA PECTORIS: Primary | ICD-10-CM

## 2022-08-10 DIAGNOSIS — I50.23 ACUTE ON CHRONIC SYSTOLIC CONGESTIVE HEART FAILURE (H): ICD-10-CM

## 2022-08-10 NOTE — TELEPHONE ENCOUNTER
Order teed up. Unable to find in office notes.  Norma J. Gosselin, LPN .......  8/10/2022  2:34 PM

## 2022-08-10 NOTE — TELEPHONE ENCOUNTER
Dr Hannah reviewed and completed the following home care or hospice form(s) for  [facility] home care   This covers the certification period effective 07/28/2022 to 09/25/2022.  Kajal Red, YAS on 8/10/2022 at 2:02 PM

## 2022-08-10 NOTE — TELEPHONE ENCOUNTER
Order faxed to  [email or web address] [place].  Norma J. Gosselin, LPN .......  8/10/2022  3:47 PM

## 2022-08-12 ENCOUNTER — TELEPHONE (OUTPATIENT)
Dept: FAMILY MEDICINE | Facility: OTHER | Age: 77
End: 2022-08-12
Payer: MEDICARE

## 2022-08-12 DIAGNOSIS — I11.0 HYPERTENSIVE HEART DISEASE WITH HEART FAILURE (H): Primary | ICD-10-CM

## 2022-08-12 NOTE — TELEPHONE ENCOUNTER
Hugo Hannah MD reviewed and completed the following home care or hospice form(s) for [facility]   This covers the certification period effective 7- to 9-.  Ekaterina Powell LPN on 8/12/2022 at 1:33 PM

## 2022-08-23 ENCOUNTER — TELEPHONE (OUTPATIENT)
Dept: FAMILY MEDICINE | Facility: OTHER | Age: 77
End: 2022-08-23

## 2022-08-23 NOTE — TELEPHONE ENCOUNTER
Waqas takes his weight every day. 153.4 a couple of days ago. Today 157.2. Just wanted to advise. He is not having shortness of breath. He can pretty much relate it to his diet in the last few days.    Anita Dawson on 8/23/2022 at 9:18 AM

## 2022-09-08 ENCOUNTER — OFFICE VISIT (OUTPATIENT)
Dept: FAMILY MEDICINE | Facility: OTHER | Age: 77
End: 2022-09-08
Attending: FAMILY MEDICINE
Payer: MEDICARE

## 2022-09-08 VITALS
SYSTOLIC BLOOD PRESSURE: 102 MMHG | BODY MASS INDEX: 29.3 KG/M2 | HEIGHT: 63 IN | RESPIRATION RATE: 24 BRPM | WEIGHT: 165.4 LBS | HEART RATE: 73 BPM | OXYGEN SATURATION: 97 % | DIASTOLIC BLOOD PRESSURE: 64 MMHG | TEMPERATURE: 97.7 F

## 2022-09-08 DIAGNOSIS — I25.10 CORONARY ARTERY DISEASE INVOLVING NATIVE CORONARY ARTERY OF NATIVE HEART WITHOUT ANGINA PECTORIS: ICD-10-CM

## 2022-09-08 DIAGNOSIS — Z01.818 PRE-OP EXAM: Primary | ICD-10-CM

## 2022-09-08 DIAGNOSIS — I50.23 ACUTE ON CHRONIC SYSTOLIC CONGESTIVE HEART FAILURE (H): ICD-10-CM

## 2022-09-08 DIAGNOSIS — H25.12 AGE-RELATED NUCLEAR CATARACT OF LEFT EYE: ICD-10-CM

## 2022-09-08 PROBLEM — H25.10 AGE-RELATED NUCLEAR CATARACT: Status: ACTIVE | Noted: 2022-09-08

## 2022-09-08 PROCEDURE — G0463 HOSPITAL OUTPT CLINIC VISIT: HCPCS | Performed by: FAMILY MEDICINE

## 2022-09-08 PROCEDURE — 99214 OFFICE O/P EST MOD 30 MIN: CPT | Performed by: FAMILY MEDICINE

## 2022-09-08 RX ORDER — SPIRONOLACTONE 25 MG/1
1 TABLET ORAL DAILY
COMMUNITY
Start: 2022-08-23 | End: 2023-12-20

## 2022-09-08 RX ORDER — FUROSEMIDE 40 MG
80 TABLET ORAL 2 TIMES DAILY
COMMUNITY
Start: 2022-09-08 | End: 2022-10-24

## 2022-09-08 ASSESSMENT — PAIN SCALES - GENERAL: PAINLEVEL: MODERATE PAIN (5)

## 2022-09-08 NOTE — PROGRESS NOTES
[facility]  [address]  [place] [address]  Phone: [phone]  Fax: [phone]  Primary Provider: Emre Hannah  Pre-op Performing Provider: EMRE HANNAH      PREOPERATIVE EVALUATION:  Today's date: 9/8/2022    Waqas Ferro is a 77 year old male who presents for a preoperative evaluation.    Surgical Information:  Surgery/Procedure: cataract left   Surgery Location: [place]   Surgeon:    Surgery Date: 09/14/2022  Time of Surgery: TBD  Where patient plans to recover: At home with family  Fax number for surgical facility: [phone]    Type of Anesthesia Anticipated: to be determined    Assessment & Plan     The proposed surgical procedure is considered LOW risk.    Pre-op exam      Age-related nuclear cataract of left eye      Acute on chronic systolic congestive heart failure (H)  Recent ejection fraction 22 percent.  - furosemide (LASIX) 40 MG tablet; 2 tablets (80 mg) 2 times daily Take 3 tabs, 120 mg in AM and 2 tabs, 80 mg in PM    Coronary artery disease involving native coronary artery of native heart without angina pectoris             Risks and Recommendations:  The patient has the following additional risks and recommendations for perioperative complications:   - No identified additional risk factors other than previously addressed    Medication Instructions:      RECOMMENDATION:  APPROVAL GIVEN to proceed with proposed procedure, without further diagnostic evaluation.                      Subjective     HPI related to upcoming procedure: Patient arrives here for preop.  He is scheduled undergo cataract surgery of his left eye September 14.  He reports having trouble with seeing over the last year.  Patient is also requesting form brought in from the Department of Motor Veh[employer].  He was given a handicap sticker I do not have the report available.  He is not sure who gave it to him.  But the report asked for length of time.  He states that he  believes the handicap sticker is for congestive heart failure.  He does use a walker.  Reports that he can only walk about 20 to 30 feet without stopping to rest because of his CHF and ejection fraction of 22%.  He also has arthritic knees.    Preop Questions 9/8/2022   1. Have you ever had a heart attack or stroke? No   2. Have you ever had surgery on your heart or blood vessels, such as a stent placement, a coronary artery bypass, or surgery on an artery in your head, neck, heart, or legs? YES -    3. Do you have chest pain with activity? No   4. Do you have a history of  heart failure? YES -    5. Do you currently have a cold, bronchitis or symptoms of other infection? No   6. Do you have a cough, shortness of breath, or wheezing? No   7. Do you or anyone in your family have previous history of blood clots? No   8. Do you or does anyone in your family have a serious bleeding problem such as prolonged bleeding following surgeries or cuts? No   9. Have you ever had problems with anemia or been told to take iron pills? No   10. Have you had any abnormal blood loss such as black, tarry or bloody stools? No   11. Have you ever had a blood transfusion? No   12. Are you willing to have a blood transfusion if it is medically needed before, during, or after your surgery? Yes   13. Have you or any of your relatives ever had problems with anesthesia? No   14. Do you have sleep apnea, excessive snoring or daytime drowsiness? No   15. Do you have any artifical heart valves or other implanted medical devices like a pacemaker, defibrillator, or continuous glucose monitor? No   16. Do you have artificial joints? No   17. Are you allergic to latex? No       Health Care Directive:  Patient does not have a Health Care Directive or Living Will: Discussed advance care planning with patient; however, patient declined at this time.    Preoperative Review of :   reviewed - no record of controlled substances  prescribed.          Review of Systems  CONSTITUTIONAL: NEGATIVE for fever, chills, change in weight  ENT/MOUTH: NEGATIVE for ear, mouth and throat problems  RESP: NEGATIVE for significant cough or SOB  CV: NEGATIVE for chest pain, palpitations or peripheral edema    Patient Active Problem List    Diagnosis Date Noted     Coronary artery disease involving native coronary artery of native heart without angina pectoris 07/25/2022     Priority: Medium     Scrotal swelling 07/24/2022     Priority: Medium     Bilateral lower extremity edema 07/24/2022     Priority: Medium     Acute on chronic congestive heart failure, unspecified heart failure type (H) 07/24/2022     Priority: Medium     CHF exacerbation (H) 07/24/2022     Priority: Medium     Acute kidney injury (H) 07/24/2022     Priority: Medium     Acute on chronic systolic congestive heart failure (H) 02/04/2019     Priority: Medium      Past Medical History:   Diagnosis Date     Coronary artery disease involving native coronary artery of native heart without angina pectoris      Ischemic cardiomyopathy      Past Surgical History:   Procedure Laterality Date     HEART CATH STENT COR W/WO PTCA      chronic LAD occlusion     Current Outpatient Medications   Medication Sig Dispense Refill     aspirin (ASA) 81 MG chewable tablet Take 81 mg by mouth daily  2     atorvastatin (LIPITOR) 40 MG tablet Take 40 mg by mouth At Bedtime  2     clopidogrel (PLAVIX) 75 MG tablet Take 75 mg by mouth daily  2     furosemide (LASIX) 40 MG tablet Take 3 tabs, 120 mg in AM and 2 tabs, 80 mg in PM (Patient taking differently: 80 mg 2 times daily Take 3 tabs, 120 mg in AM and 2 tabs, 80 mg in PM) 150 tablet 2     nitroGLYcerin (NITROSTAT) 0.4 MG sublingual tablet For chest pain place 1 tablet under the tongue every 5 minutes for 3 doses. If symptoms persist 5 minutes after 1st dose call 911. 20 tablet 1     spironolactone (ALDACTONE) 25 MG tablet Take 1 tablet by mouth daily        "nitroGLYcerin (NITROSTAT) 0.4 MG sublingual tablet For chest pain place 1 tablet under the tongue every 5 minutes for 3 doses. If symptoms persist 5 minutes after 1st dose call 911. (Patient not taking: Reported on 2022) 20 tablet 1       No Known Allergies     Social History     Tobacco Use     Smoking status: Former Smoker     Packs/day: 4.00     Years: 20.00     Pack years: 80.00     Types: Cigarettes     Quit date:      Years since quittin.7     Smokeless tobacco: Never Used   Substance Use Topics     Alcohol use: Not Currently     Alcohol/week: 2.0 standard drinks     Types: 2 Cans of beer per week     Comment: 4 a month      Family History   Problem Relation Age of Onset     Alcoholism Father      Cancer Sister      Coronary Artery Disease Brother      History   Drug Use No         Objective     /64   Pulse 73   Temp 97.7  F (36.5  C)   Resp 24   Ht 1.6 m (5' 3\")   Wt 75 kg (165 lb 6.4 oz)   SpO2 97%   BMI 29.30 kg/m      Physical Exam  Constitutional:       Appearance: Normal appearance.   HENT:      Head: Normocephalic.   Cardiovascular:      Rate and Rhythm: Normal rate.   Pulmonary:      Effort: Pulmonary effort is normal.      Breath sounds: Normal breath sounds.   Abdominal:      General: Abdomen is flat.   Musculoskeletal:      Comments: Patient has 3+ lower extremity edema   Skin:     General: Skin is warm.   Neurological:      Mental Status: He is alert.   Psychiatric:         Mood and Affect: Mood normal.      Comments: Slightly tangential       GENERAL APPEARANCE: healthy, alert and no distress  HENT: ear canals and TM's normal and nose and mouth without ulcers or lesions  RESP: lungs clear to auscultation - no rales, rhonchi or wheezes  CV: regular rate and rhythm, normal S1 S2, no S3 or S4 and no murmur, click or rub   ABDOMEN: soft, nontender, no HSM or masses and bowel sounds normal  NEURO: Normal strength and tone, sensory exam grossly normal, mentation intact and " speech normal    Recent Labs   Lab Test 08/01/22  1133 07/27/22  0526 07/26/22  0605   HGB  --  12.4* 12.0*   PLT  --  164 170    140 136   POTASSIUM 3.6 3.9 3.8   CR 1.28 1.52* 1.55*        Diagnostics:     No EKG required for low risk surgery (cataract, skin procedure, breast biopsy, etc).    Revised Cardiac Risk Index (RCRI):  The patient has the following serious cardiovascular risks for perioperative complications:       RCRI Interpretation:          Signed Electronically by: uHgo Hannah MD  Copy of this evaluation report is provided to requesting physician.

## 2022-09-21 DIAGNOSIS — I25.10 CORONARY ARTERY DISEASE INVOLVING NATIVE CORONARY ARTERY OF NATIVE HEART WITHOUT ANGINA PECTORIS: ICD-10-CM

## 2022-09-21 DIAGNOSIS — I50.9 ACUTE ON CHRONIC CONGESTIVE HEART FAILURE, UNSPECIFIED HEART FAILURE TYPE (H): ICD-10-CM

## 2022-09-22 RX ORDER — METOPROLOL SUCCINATE 100 MG/1
100 TABLET, EXTENDED RELEASE ORAL DAILY
Qty: 90 TABLET | Refills: 4 | Status: SHIPPED | OUTPATIENT
Start: 2022-09-22 | End: 2023-11-02

## 2022-09-22 NOTE — TELEPHONE ENCOUNTER
[id number] sent Rx request for the following:      METOPROLOL SUCC 100MG ER TAB      Last Prescription Date:   7/27/2022  Last Fill Qty/Refills:         30, R-0    Last Office Visit:              9/8/2022   Future Office visit:           none    Routing refill request to provider for review/approval because:  Patient was told to hold medication at discharge from hospital on 7/27/22 until he saw his PCP. Unsure if patient should be restarting medication.     Yordan Lopes RN, BSN  ....................  9/22/2022   10:58 AM

## 2022-10-22 DIAGNOSIS — I50.23 ACUTE ON CHRONIC SYSTOLIC CONGESTIVE HEART FAILURE (H): ICD-10-CM

## 2022-10-24 RX ORDER — FUROSEMIDE 40 MG
TABLET ORAL
Qty: 150 TABLET | Refills: 2 | Status: SHIPPED | OUTPATIENT
Start: 2022-10-24 | End: 2023-06-29

## 2022-10-24 NOTE — TELEPHONE ENCOUNTER
[id number] sent Rx request for the following:      FUROSEMIDE 40MG TABLET      Last Prescription Date:   historical  Last Fill Qty/Refills:         n/a, R-n/a    Last Office Visit:              9/8/2022   Future Office visit:           none    Routing refill request to provider for review/approval because:  Medication is reported/historical    Yordan Lopes RN, BSN  ....................  10/24/2022   2:22 PM

## 2023-06-27 DIAGNOSIS — I50.23 ACUTE ON CHRONIC SYSTOLIC CONGESTIVE HEART FAILURE (H): ICD-10-CM

## 2023-06-29 RX ORDER — FUROSEMIDE 40 MG
TABLET ORAL
Qty: 150 TABLET | Refills: 2 | Status: SHIPPED | OUTPATIENT
Start: 2023-06-29 | End: 2023-12-20

## 2023-06-29 NOTE — TELEPHONE ENCOUNTER
[id number] ([facility] of [place] sent Rx request for the following:      Requested Prescriptions   Pending Prescriptions Disp Refills     furosemide (LASIX) 40 MG tablet [Pharmacy Med Name: FUROSEMIDE 40MG TABLET] 150 tablet 2     Sig: TAKE 3 TABLETS (120MG) BY MOUTH IN THE MORNING AND TAKE 2 TABLETS (80MG) IN THE EVENING.   Last Prescription Date:   10/24/22  Last Fill Qty/Refills:         150, R-2    Last Office Visit:              9/8/22   Future Office visit:           None    Lyric Kelly RN .............. 6/29/2023  2:47 PM

## 2023-09-13 ENCOUNTER — APPOINTMENT (OUTPATIENT)
Dept: CT IMAGING | Facility: OTHER | Age: 78
End: 2023-09-13
Attending: FAMILY MEDICINE
Payer: COMMERCIAL

## 2023-09-13 ENCOUNTER — HOSPITAL ENCOUNTER (EMERGENCY)
Facility: OTHER | Age: 78
Discharge: HOME | End: 2023-09-13
Attending: FAMILY MEDICINE | Admitting: FAMILY MEDICINE
Payer: COMMERCIAL

## 2023-09-13 VITALS
HEART RATE: 74 BPM | RESPIRATION RATE: 14 BRPM | OXYGEN SATURATION: 92 % | SYSTOLIC BLOOD PRESSURE: 108 MMHG | DIASTOLIC BLOOD PRESSURE: 74 MMHG | TEMPERATURE: 97 F
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
## 2023-09-13 DIAGNOSIS — R51.9 NONINTRACTABLE HEADACHE, UNSPECIFIED CHRONICITY PATTERN, UNSPECIFIED HEADACHE TYPE: ICD-10-CM

## 2023-09-13 LAB
ANION GAP SERPL CALCULATED.3IONS-SCNC: 9 MMOL/L (ref 7–15)
BASOPHILS # BLD AUTO: 0.1 10E3/UL (ref 0–0.2)
BASOPHILS NFR BLD AUTO: 1 %
BUN SERPL-MCNC: 33.8 MG/DL (ref 8–23)
CALCIUM SERPL-MCNC: 9.5 MG/DL (ref 8.8–10.2)
CHLORIDE SERPL-SCNC: 91 MMOL/L (ref 98–107)
CREAT SERPL-MCNC: 1.38 MG/DL (ref 0.67–1.17)
DEPRECATED HCO3 PLAS-SCNC: 30 MMOL/L (ref 22–29)
EGFRCR SERPLBLD CKD-EPI 2021: 53 ML/MIN/1.73M2
EOSINOPHIL # BLD AUTO: 0.1 10E3/UL (ref 0–0.7)
EOSINOPHIL NFR BLD AUTO: 1 %
ERYTHROCYTE [DISTWIDTH] IN BLOOD BY AUTOMATED COUNT: 17.6 % (ref 10–15)
GLUCOSE SERPL-MCNC: 156 MG/DL (ref 70–99)
HCT VFR BLD AUTO: 38 % (ref 40–53)
HGB BLD-MCNC: 12.4 G/DL (ref 13.3–17.7)
HOLD SPECIMEN: NORMAL
HOLD SPECIMEN: NORMAL
IMM GRANULOCYTES # BLD: 0 10E3/UL
IMM GRANULOCYTES NFR BLD: 1 %
INR PPP: 1.25 (ref 0.85–1.15)
LYMPHOCYTES # BLD AUTO: 0.5 10E3/UL (ref 0.8–5.3)
LYMPHOCYTES NFR BLD AUTO: 6 %
MCH RBC QN AUTO: 26.8 PG (ref 26.5–33)
MCHC RBC AUTO-ENTMCNC: 32.6 G/DL (ref 31.5–36.5)
MCV RBC AUTO: 82 FL (ref 78–100)
MONOCYTES # BLD AUTO: 0.9 10E3/UL (ref 0–1.3)
MONOCYTES NFR BLD AUTO: 11 %
NEUTROPHILS # BLD AUTO: 6.7 10E3/UL (ref 1.6–8.3)
NEUTROPHILS NFR BLD AUTO: 80 %
NRBC # BLD AUTO: 0 10E3/UL
NRBC BLD AUTO-RTO: 0 /100
PLATELET # BLD AUTO: 317 10E3/UL (ref 150–450)
POTASSIUM SERPL-SCNC: 4.1 MMOL/L (ref 3.4–5.3)
RBC # BLD AUTO: 4.62 10E6/UL (ref 4.4–5.9)
SODIUM SERPL-SCNC: 130 MMOL/L (ref 136–145)
TROPONIN T SERPL HS-MCNC: 29 NG/L
WBC # BLD AUTO: 8.3 10E3/UL (ref 4–11)

## 2023-09-13 PROCEDURE — 85025 COMPLETE CBC W/AUTO DIFF WBC: CPT | Performed by: FAMILY MEDICINE

## 2023-09-13 PROCEDURE — 70450 CT HEAD/BRAIN W/O DYE: CPT

## 2023-09-13 PROCEDURE — 96375 TX/PRO/DX INJ NEW DRUG ADDON: CPT | Performed by: FAMILY MEDICINE

## 2023-09-13 PROCEDURE — 93010 ELECTROCARDIOGRAM REPORT: CPT | Performed by: STUDENT IN AN ORGANIZED HEALTH CARE EDUCATION/TRAINING PROGRAM

## 2023-09-13 PROCEDURE — 80048 BASIC METABOLIC PNL TOTAL CA: CPT | Performed by: FAMILY MEDICINE

## 2023-09-13 PROCEDURE — 84484 ASSAY OF TROPONIN QUANT: CPT | Performed by: FAMILY MEDICINE

## 2023-09-13 PROCEDURE — 96361 HYDRATE IV INFUSION ADD-ON: CPT | Performed by: FAMILY MEDICINE

## 2023-09-13 PROCEDURE — 72125 CT NECK SPINE W/O DYE: CPT

## 2023-09-13 PROCEDURE — 96374 THER/PROPH/DIAG INJ IV PUSH: CPT | Performed by: FAMILY MEDICINE

## 2023-09-13 PROCEDURE — 93005 ELECTROCARDIOGRAM TRACING: CPT | Performed by: FAMILY MEDICINE

## 2023-09-13 PROCEDURE — 258N000003 HC RX IP 258 OP 636: Performed by: FAMILY MEDICINE

## 2023-09-13 PROCEDURE — 99284 EMERGENCY DEPT VISIT MOD MDM: CPT | Performed by: FAMILY MEDICINE

## 2023-09-13 PROCEDURE — 99285 EMERGENCY DEPT VISIT HI MDM: CPT | Mod: 25 | Performed by: FAMILY MEDICINE

## 2023-09-13 PROCEDURE — 85610 PROTHROMBIN TIME: CPT | Performed by: FAMILY MEDICINE

## 2023-09-13 PROCEDURE — 36415 COLL VENOUS BLD VENIPUNCTURE: CPT | Performed by: FAMILY MEDICINE

## 2023-09-13 PROCEDURE — 250N000011 HC RX IP 250 OP 636: Mod: JZ | Performed by: FAMILY MEDICINE

## 2023-09-13 RX ORDER — ONDANSETRON 2 MG/ML
4 INJECTION INTRAMUSCULAR; INTRAVENOUS ONCE
Status: DISCONTINUED | OUTPATIENT
Start: 2023-09-13 | End: 2023-09-13 | Stop reason: HOSPADM

## 2023-09-13 RX ORDER — METHYLPREDNISOLONE SODIUM SUCCINATE 125 MG/2ML
125 INJECTION, POWDER, LYOPHILIZED, FOR SOLUTION INTRAMUSCULAR; INTRAVENOUS ONCE
Status: COMPLETED | OUTPATIENT
Start: 2023-09-13 | End: 2023-09-13

## 2023-09-13 RX ORDER — RIZATRIPTAN BENZOATE 5 MG/1
5 TABLET ORAL
Qty: 20 TABLET | Refills: 0 | Status: SHIPPED | OUTPATIENT
Start: 2023-09-13 | End: 2023-09-23

## 2023-09-13 RX ORDER — FENTANYL CITRATE 50 UG/ML
25 INJECTION, SOLUTION INTRAMUSCULAR; INTRAVENOUS ONCE
Status: COMPLETED | OUTPATIENT
Start: 2023-09-13 | End: 2023-09-13

## 2023-09-13 RX ORDER — HYDROMORPHONE HCL IN WATER/PF 6 MG/30 ML
0.2 PATIENT CONTROLLED ANALGESIA SYRINGE INTRAVENOUS EVERY 30 MIN PRN
Status: DISCONTINUED | OUTPATIENT
Start: 2023-09-13 | End: 2023-09-13 | Stop reason: HOSPADM

## 2023-09-13 RX ADMIN — FENTANYL CITRATE 25 MCG: 50 INJECTION, SOLUTION INTRAMUSCULAR; INTRAVENOUS at 09:10

## 2023-09-13 RX ADMIN — HYDROMORPHONE HYDROCHLORIDE 0.2 MG: 0.2 INJECTION, SOLUTION INTRAMUSCULAR; INTRAVENOUS; SUBCUTANEOUS at 10:57

## 2023-09-13 RX ADMIN — METHYLPREDNISOLONE SODIUM SUCCINATE 125 MG: 125 INJECTION, POWDER, FOR SOLUTION INTRAMUSCULAR; INTRAVENOUS at 10:54

## 2023-09-13 RX ADMIN — SODIUM CHLORIDE 250 ML: 9 INJECTION, SOLUTION INTRAVENOUS at 09:11

## 2023-09-13 ASSESSMENT — ENCOUNTER SYMPTOMS
HEADACHES: 1
FACIAL ASYMMETRY: 0
SEIZURES: 0
VOMITING: 0
CHILLS: 0
FACIAL SWELLING: 0
LIGHT-HEADEDNESS: 0
PHOTOPHOBIA: 0
AGITATION: 0
NAUSEA: 1
BRUISES/BLEEDS EASILY: 1
CONFUSION: 0
NECK STIFFNESS: 0
FLANK PAIN: 0
NUMBNESS: 0
SPEECH DIFFICULTY: 0
TREMORS: 0
FEVER: 0
ADENOPATHY: 0

## 2023-09-13 ASSESSMENT — ACTIVITIES OF DAILY LIVING (ADL)
ADLS_ACUITY_SCORE: 35

## 2023-09-13 NOTE — ED TRIAGE NOTES
Pt arrives via private car with complaints of a severe headache. Pt fell out of bed about a week ago and can't remember if  he hit his head or not, states he is on blood thinners. Denies losing consciousness. Headache started 3 days ago and radiates to neck. Denies taking any pain relievers. Neuro assessment intact.     Triage Assessment       Row Name 09/13/23 0846       Triage Assessment (Adult)    Airway WDL WDL       Respiratory WDL    Respiratory WDL WDL       Skin Circulation/Temperature WDL    Skin Circulation/Temperature WDL WDL       Cardiac WDL    Cardiac WDL WDL       Peripheral/Neurovascular WDL    Peripheral Neurovascular WDL WDL       Cognitive/Neuro/Behavioral WDL    Cognitive/Neuro/Behavioral WDL WDL

## 2023-09-13 NOTE — ED PROVIDER NOTES
History     Chief Complaint   Patient presents with    Fall    Headache     HPI  Waqas Ferro is a 78 year old male with history of CHF, edema, ÓSCAR, CAD who presents emergency department a week after he rolled out of bed, not sure if he hit his head significantly.  He is on blood thinners and had continuously worsening headache since then.  Headache came on very slowly, no abrupt onset.  No neck stiffness, moves his head without any difficulty.  No recent fevers or chills.  Mildly nauseated on occasion, no emesis however.  No visual changes, reports his pain is 8 or 9 out of 10 at the time of my evaluation.  Patient does not endorse any strokelike symptoms.  His gait has been steady.  Patient does not endorse any chest pain or shortness of breath.  Patient just fell out of bed, he had no passing out spells, no recurrent falls.  No trauma otherwise.  Patient arrives by private vehicle, his landlord drove him into the ED.  Patient is on Plavix and aspirin due to his coronary artery disease.  Patient states he bruises easily due to these medications.    Reviewed nurses notes below, similar history is related to me.  Pt arrives via private car with complaints of a severe headache. Pt fell out of bed about a week ago and can't remember if  he hit his head or not, states he is on blood thinners. Denies losing consciousness. Headache started 3 days ago and radiates to neck. Denies taking any pain relievers. Neuro assessment intact.     Allergies:  No Known Allergies    Problem List:    Patient Active Problem List    Diagnosis Date Noted    Age-related nuclear cataract 09/08/2022     Priority: Medium    Coronary artery disease involving native coronary artery of native heart without angina pectoris 07/25/2022     Priority: Medium    Scrotal swelling 07/24/2022     Priority: Medium    Bilateral lower extremity edema 07/24/2022     Priority: Medium    Acute on chronic congestive heart failure, unspecified heart failure  type (H) 2022     Priority: Medium    CHF exacerbation (H) 2022     Priority: Medium    Acute kidney injury (H) 2022     Priority: Medium    Acute on chronic systolic congestive heart failure (H) ejection fraction 22% 2019     Priority: Medium        Past Medical History:    Past Medical History:   Diagnosis Date    Coronary artery disease involving native coronary artery of native heart without angina pectoris     Ischemic cardiomyopathy        Past Surgical History:    Past Surgical History:   Procedure Laterality Date    HEART CATH STENT COR W/WO PTCA      chronic LAD occlusion       Family History:    Family History   Problem Relation Age of Onset    Alcoholism Father     Cancer Sister     Coronary Artery Disease Brother        Social History:  Marital Status:  Single [1]  Social History     Tobacco Use    Smoking status: Former     Packs/day: 4.00     Years: 20.00     Pack years: 80.00     Types: Cigarettes     Quit date:      Years since quittin.7    Smokeless tobacco: Never   Vaping Use    Vaping Use: Never used   Substance Use Topics    Alcohol use: Not Currently     Alcohol/week: 2.0 standard drinks of alcohol     Types: 2 Cans of beer per week     Comment: 4 a month     Drug use: No        Medications:    aspirin (ASA) 81 MG chewable tablet  atorvastatin (LIPITOR) 40 MG tablet  clopidogrel (PLAVIX) 75 MG tablet  furosemide (LASIX) 40 MG tablet  metoprolol succinate ER (TOPROL XL) 100 MG 24 hr tablet  rizatriptan (MAXALT) 5 MG tablet  spironolactone (ALDACTONE) 25 MG tablet  nitroGLYcerin (NITROSTAT) 0.4 MG sublingual tablet          Review of Systems   Constitutional:  Negative for chills and fever.   HENT:  Negative for ear pain and facial swelling.    Eyes:  Negative for photophobia and visual disturbance.   Gastrointestinal:  Positive for nausea. Negative for vomiting.   Genitourinary:  Negative for flank pain.   Musculoskeletal:  Negative for neck stiffness.    Neurological:  Positive for headaches. Negative for tremors, seizures, syncope, facial asymmetry, speech difficulty, light-headedness and numbness.   Hematological:  Negative for adenopathy. Bruises/bleeds easily.   Psychiatric/Behavioral:  Negative for agitation and confusion.        Physical Exam   BP: 110/75  Pulse: 85  Temp: 97  F (36.1  C)  Resp: 20  SpO2: 99 %      Physical Exam  Vitals and nursing note reviewed.   Constitutional:       Appearance: Normal appearance.   HENT:      Head: Atraumatic.      Right Ear: Tympanic membrane normal.      Left Ear: Tympanic membrane normal.      Nose: Nose normal.   Eyes:      Pupils: Pupils are equal, round, and reactive to light.   Cardiovascular:      Rate and Rhythm: Normal rate and regular rhythm.      Pulses: Normal pulses.   Pulmonary:      Effort: Pulmonary effort is normal.      Breath sounds: Normal breath sounds.   Musculoskeletal:      Cervical back: Normal range of motion.      Right lower leg: No edema.      Left lower leg: No edema.   Skin:     Capillary Refill: Capillary refill takes less than 2 seconds.   Neurological:      General: No focal deficit present.      Mental Status: He is alert.      Cranial Nerves: No cranial nerve deficit.      Motor: No weakness.      Gait: Gait normal.       EKG: Normal sinus rhythm, rate 73, QTc 462 ms, QRS duration 94 ms.  Nonspecific T wave inversion in V3 through V6.  When compared with 24 July 2022 EKG there is no significant clinical change.    Results for orders placed or performed during the hospital encounter of 09/13/23 (from the past 24 hour(s))   CBC with platelets differential    Narrative    The following orders were created for panel order CBC with platelets differential.  Procedure                               Abnormality         Status                     ---------                               -----------         ------                     CBC with platelets and d...[[id number]]  Abnormal             Final result                 Please view results for these tests on the individual orders.   Basic metabolic panel   Result Value Ref Range    Sodium 130 (L) 136 - 145 mmol/L    Potassium 4.1 3.4 - 5.3 mmol/L    Chloride 91 (L) 98 - 107 mmol/L    Carbon Dioxide (CO2) 30 (H) 22 - 29 mmol/L    Anion Gap 9 7 - 15 mmol/L    Urea Nitrogen 33.8 (H) 8.0 - 23.0 mg/dL    Creatinine 1.38 (H) 0.67 - 1.17 mg/dL    Calcium 9.5 8.8 - 10.2 mg/dL    Glucose 156 (H) 70 - 99 mg/dL    GFR Estimate 53 (L) >60 mL/min/1.73m2   INR   Result Value Ref Range    INR 1.25 (H) 0.85 - 1.15   CBC with platelets and differential   Result Value Ref Range    WBC Count 8.3 4.0 - 11.0 10e3/uL    RBC Count 4.62 4.40 - 5.90 10e6/uL    Hemoglobin 12.4 (L) 13.3 - 17.7 g/dL    Hematocrit 38.0 (L) 40.0 - 53.0 %    MCV 82 78 - 100 fL    MCH 26.8 26.5 - 33.0 pg    MCHC 32.6 31.5 - 36.5 g/dL    RDW 17.6 (H) 10.0 - 15.0 %    Platelet Count 317 150 - 450 10e3/uL    % Neutrophils 80 %    % Lymphocytes 6 %    % Monocytes 11 %    % Eosinophils 1 %    % Basophils 1 %    % Immature Granulocytes 1 %    NRBCs per 100 WBC 0 <1 /100    Absolute Neutrophils 6.7 1.6 - 8.3 10e3/uL    Absolute Lymphocytes 0.5 (L) 0.8 - 5.3 10e3/uL    Absolute Monocytes 0.9 0.0 - 1.3 10e3/uL    Absolute Eosinophils 0.1 0.0 - 0.7 10e3/uL    Absolute Basophils 0.1 0.0 - 0.2 10e3/uL    Absolute Immature Granulocytes 0.0 <=0.4 10e3/uL    Absolute NRBCs 0.0 10e3/uL   Extra Tube    Narrative    The following orders were created for panel order Extra Tube.  Procedure                               Abnormality         Status                     ---------                               -----------         ------                     Extra Red Top Tube[[id number]]                               Final result               Extra Purple Top Tube[[id number]]                                                       Extra Green Top (Lithium...[[id number]]                      Final result                 Please view  results for these tests on the individual orders.   Extra Red Top Tube   Result Value Ref Range    Hold Specimen JIC    Extra Green Top (Lithium Heparin) ON ICE   Result Value Ref Range    Hold Specimen JIC    Troponin T, High Sensitivity   Result Value Ref Range    Troponin T, High Sensitivity 29 (H) <=22 ng/L   CT Head w/o Contrast    Narrative    PROCEDURE: CT HEAD W/O CONTRAST   9/13/2023 9:33 AM    HISTORY:Male, age,  78 years, , , fall HA    COMPARISON:No relevant prior imaging.    TECHNIQUE: CT of the brain without contrast. Axial; sagittal and  coronal reconstructed images were reviewed.    FINDINGS: Ventricles and sulci mildly enlarged consistent with mild  generalized atrophy. Gray and white matter demonstrate scattered areas  of decreased density. The cystic structure seen along the periphery of  the posterior fossa.    There is no evidence of mass, mass effect or midline shift. No  evidence of acute hemorrhage. No acute fracture.       Impression    IMPRESSION:   No acute intracranial abnormality.  No acute fracture.     Midline/para midline arachnoid cyst.    Findings suggesting mild atrophy and small vessel disease.      This facility minimizes radiation dose by adjusting the mA and/or kV  according to each patient size.      This CT scan was performed using one or more the following dose  reduction techniques:    -Automated exposure control,  -Adjustment of the mA and/or kV according to patient's size, and/or,  -Use of iterative reconstruction technique.      EMRE MAJOR MD         SYSTEM ID:  [id number]   CT Cervical Spine w/o Contrast    Narrative    CT CERVICAL SPINE W/O CONTRAST, 9/13/2023 9:42 AM    History: Male, age 78 years; fall HA    Comparison: No relevant prior imaging.    TECHNIQUE: CT was performed of the cervical spine. Axial, sagittal and  coronal images were reviewed. MIP and/or 3-D images were constructed  by the technologist.    FINDINGS: Cervical spine demonstrates normal  curvature. There is no  evidence of an acute fracture. Vertebral bodies and posterior elements  are well aligned. Of degenerative changes seen throughout the cervical  spine, most evident along the dorsal aspect of the atlantoaxial joint  as well as along the C5-6 and C6-7 disc spaces. Soft tissues of the  neck demonstrate no acute abnormality.      Impression    IMPRESSION:   No evidence of acute or subacute bony abnormality.    This facility minimizes radiation dose by adjusting the mA and/or kV  according to each patient size.      This CT scan was performed using one or more the following dose  reduction techniques:    -Automated exposure control,  -Adjustment of the mA and/or kV according to patient's size, and/or,  -Use of iterative reconstruction technique.    EMRE MAJOR MD         SYSTEM ID:  [id number]       Medications   ondansetron (ZOFRAN) injection 4 mg (0 mg Intravenous Hold 9/13/23 1057)   HYDROmorphone (DILAUDID) injection 0.2 mg (0.2 mg Intravenous $Given 9/13/23 1057)   sodium chloride 0.9% BOLUS 250 mL (0 mLs Intravenous Stopped 9/13/23 1057)   fentaNYL (PF) (SUBLIMAZE) injection 25 mcg (25 mcg Intravenous $Given 9/13/23 0910)   methylPREDNISolone sodium succinate (solu-MEDROL) injection 125 mg (125 mg Intravenous $Given 9/13/23 1054)       Assessments & Plan (with Medical Decision Making)     I have reviewed the nursing notes.    I have reviewed the findings, diagnosis, plan and need for follow up with the patient.      Medical Decision Making  The patient's presentation was of moderate complexity (an acute illness with systemic symptoms).    The patient's evaluation involved:  history and exam without other MDM data elements    The patient's management necessitated moderate risk (prescription drug management including medications given in the ED).    Headache: Differential diagnosis includes but not limited to migraine, dehydration, postconcussive headache, CVA, occult malignancy, occult  bleeding among others.  Reassuring presentation, reassuring clinical work-up here in the ED.  Reassuring clinical trajectory over the course of his ED stay.  After shared decision-making conversation, patient feels like to go home without additional interventions or diagnostics as they are not definitively urgently indicated at this time.    New Prescriptions    RIZATRIPTAN (MAXALT) 5 MG TABLET    Take 1 tablet (5 mg) by mouth at onset of headache for migraine May repeat in 2 hours. Max 6 tablets/24 hours.       Final diagnoses:   Nonintractable headache, unspecified chronicity pattern, unspecified headache type   Fall at home, initial encounter     Patient feeling much better over the course of his ED stay.  Discussed options, including outpatient management, further observation in the ED or hospitalization.  Patient feels better and would like to go home.  Will return to the emergency department recurrent pain, change in pattern of pain, fever or worsening symptoms.  Patient verbalized understanding plan is agreement he left the ED in improving condition.  Reassuring serial neurologic exams performed here in the ED, gait steady, toe raise intact right before discharge.  Ambulated well as trial of discharge.  Patient left the ED with family.  9/13/2023   [facility]  09/13/23 0132

## 2023-09-15 LAB
ATRIAL RATE - MUSE: 73 BPM
DIASTOLIC BLOOD PRESSURE - MUSE: NORMAL MMHG
INTERPRETATION ECG - MUSE: NORMAL
P AXIS - MUSE: 7 DEGREES
PR INTERVAL - MUSE: 208 MS
QRS DURATION - MUSE: 94 MS
QT - MUSE: 420 MS
QTC - MUSE: 462 MS
R AXIS - MUSE: 41 DEGREES
SYSTOLIC BLOOD PRESSURE - MUSE: NORMAL MMHG
T AXIS - MUSE: 137 DEGREES
VENTRICULAR RATE- MUSE: 73 BPM

## 2023-10-29 DIAGNOSIS — I25.10 CORONARY ARTERY DISEASE INVOLVING NATIVE CORONARY ARTERY OF NATIVE HEART WITHOUT ANGINA PECTORIS: ICD-10-CM

## 2023-10-29 DIAGNOSIS — I50.9 ACUTE ON CHRONIC CONGESTIVE HEART FAILURE, UNSPECIFIED HEART FAILURE TYPE (H): ICD-10-CM

## 2023-11-02 RX ORDER — METOPROLOL SUCCINATE 100 MG/1
100 TABLET, EXTENDED RELEASE ORAL DAILY
Qty: 30 TABLET | Refills: 2 | Status: SHIPPED | OUTPATIENT
Start: 2023-11-02 | End: 2023-12-20

## 2023-11-02 NOTE — TELEPHONE ENCOUNTER
"[id number] sent Rx request for the following:      Requested Prescriptions   Pending Prescriptions Disp Refills    metoprolol succinate ER (TOPROL XL) 100 MG 24 hr tablet [Pharmacy Med Name: METOPROLOL SUCC 100MG ER TAB] 90 tablet 4     Sig: TAKE 1 TABLET (100 MG) BY MOUTH DAILY       Beta-Blockers Protocol Failed - 10/29/2023  5:00 AM        Failed - Recent (12 mo) or future (30 days) visit within the authorizing provider's specialty     Patient has had an office visit with the authorizing provider or a provider within the authorizing providers department within the previous 12 mos or has a future within next 30 days. See \"Patient Info\" tab in inbasket, or \"Choose Columns\" in Meds & Orders section of the refill encounter.              Passed - Blood pressure under 140/90 in past 12 months     BP Readings from Last 3 Encounters:   09/13/23 108/74   09/08/22 102/64   08/01/22 110/70                 Passed - Patient is age 6 or older        Passed - Medication is active on med list             Last Prescription Date:   9/22/2022  Last Fill Qty/Refills:         90, R-4    Last Office Visit:              9/8/2022 [place]   Future Office visit:           None    Patient due for annual exam, routing to scheduling to assist.    Amaris Quintana RN on 11/2/2023 at 3:04 PM        "

## 2023-11-08 ENCOUNTER — OFFICE VISIT (OUTPATIENT)
Dept: FAMILY MEDICINE | Facility: OTHER | Age: 78
End: 2023-11-08
Attending: STUDENT IN AN ORGANIZED HEALTH CARE EDUCATION/TRAINING PROGRAM
Payer: COMMERCIAL

## 2023-11-08 ENCOUNTER — HOSPITAL ENCOUNTER (EMERGENCY)
Facility: OTHER | Age: 78
Discharge: LEFT BEFORE DISCHARGE | End: 2023-11-08
Payer: COMMERCIAL

## 2023-11-08 ENCOUNTER — HOSPITAL ENCOUNTER (OUTPATIENT)
Dept: GENERAL RADIOLOGY | Facility: OTHER | Age: 78
Discharge: HOME | End: 2023-11-08
Attending: STUDENT IN AN ORGANIZED HEALTH CARE EDUCATION/TRAINING PROGRAM
Payer: COMMERCIAL

## 2023-11-08 VITALS
HEIGHT: 63 IN | RESPIRATION RATE: 24 BRPM | DIASTOLIC BLOOD PRESSURE: 69 MMHG | HEART RATE: 82 BPM | SYSTOLIC BLOOD PRESSURE: 107 MMHG | TEMPERATURE: 97.9 F | BODY MASS INDEX: 27.11 KG/M2 | WEIGHT: 153 LBS

## 2023-11-08 VITALS
OXYGEN SATURATION: 98 % | WEIGHT: 153 LBS | HEART RATE: 75 BPM | HEIGHT: 63 IN | SYSTOLIC BLOOD PRESSURE: 104 MMHG | RESPIRATION RATE: 18 BRPM | TEMPERATURE: 98.5 F | BODY MASS INDEX: 27.11 KG/M2 | DIASTOLIC BLOOD PRESSURE: 66 MMHG

## 2023-11-08 DIAGNOSIS — M79.89 SWELLING OF RIGHT FOOT: ICD-10-CM

## 2023-11-08 DIAGNOSIS — M10.9 ACUTE GOUT OF RIGHT FOOT, UNSPECIFIED CAUSE: Primary | ICD-10-CM

## 2023-11-08 LAB
ANION GAP SERPL CALCULATED.3IONS-SCNC: 12 MMOL/L (ref 7–15)
BASOPHILS # BLD AUTO: 0.1 10E3/UL (ref 0–0.2)
BASOPHILS NFR BLD AUTO: 1 %
BUN SERPL-MCNC: 14.4 MG/DL (ref 8–23)
CALCIUM SERPL-MCNC: 9.5 MG/DL (ref 8.8–10.2)
CHLORIDE SERPL-SCNC: 101 MMOL/L (ref 98–107)
CREAT SERPL-MCNC: 1.15 MG/DL (ref 0.67–1.17)
CRP SERPL-MCNC: 74.34 MG/L
DEPRECATED HCO3 PLAS-SCNC: 22 MMOL/L (ref 22–29)
EGFRCR SERPLBLD CKD-EPI 2021: 66 ML/MIN/1.73M2
EOSINOPHIL # BLD AUTO: 0.2 10E3/UL (ref 0–0.7)
EOSINOPHIL NFR BLD AUTO: 3 %
ERYTHROCYTE [DISTWIDTH] IN BLOOD BY AUTOMATED COUNT: 20.1 % (ref 10–15)
GLUCOSE SERPL-MCNC: 104 MG/DL (ref 70–99)
HCT VFR BLD AUTO: 36.5 % (ref 40–53)
HGB BLD-MCNC: 12.1 G/DL (ref 13.3–17.7)
IMM GRANULOCYTES # BLD: 0.1 10E3/UL
IMM GRANULOCYTES NFR BLD: 1 %
LYMPHOCYTES # BLD AUTO: 1 10E3/UL (ref 0.8–5.3)
LYMPHOCYTES NFR BLD AUTO: 12 %
MCH RBC QN AUTO: 28.1 PG (ref 26.5–33)
MCHC RBC AUTO-ENTMCNC: 33.2 G/DL (ref 31.5–36.5)
MCV RBC AUTO: 85 FL (ref 78–100)
MONOCYTES # BLD AUTO: 1.3 10E3/UL (ref 0–1.3)
MONOCYTES NFR BLD AUTO: 15 %
NEUTROPHILS # BLD AUTO: 6 10E3/UL (ref 1.6–8.3)
NEUTROPHILS NFR BLD AUTO: 68 %
NRBC # BLD AUTO: 0 10E3/UL
NRBC BLD AUTO-RTO: 0 /100
PLATELET # BLD AUTO: 227 10E3/UL (ref 150–450)
POTASSIUM SERPL-SCNC: 3.9 MMOL/L (ref 3.4–5.3)
RBC # BLD AUTO: 4.31 10E6/UL (ref 4.4–5.9)
SODIUM SERPL-SCNC: 135 MMOL/L (ref 135–145)
URATE SERPL-MCNC: 7.6 MG/DL (ref 3.4–7)
WBC # BLD AUTO: 8.7 10E3/UL (ref 4–11)

## 2023-11-08 PROCEDURE — G0463 HOSPITAL OUTPT CLINIC VISIT: HCPCS

## 2023-11-08 PROCEDURE — 99214 OFFICE O/P EST MOD 30 MIN: CPT | Performed by: STUDENT IN AN ORGANIZED HEALTH CARE EDUCATION/TRAINING PROGRAM

## 2023-11-08 PROCEDURE — 85025 COMPLETE CBC W/AUTO DIFF WBC: CPT | Mod: ZL | Performed by: STUDENT IN AN ORGANIZED HEALTH CARE EDUCATION/TRAINING PROGRAM

## 2023-11-08 PROCEDURE — 80048 BASIC METABOLIC PNL TOTAL CA: CPT | Mod: ZL | Performed by: STUDENT IN AN ORGANIZED HEALTH CARE EDUCATION/TRAINING PROGRAM

## 2023-11-08 PROCEDURE — 36415 COLL VENOUS BLD VENIPUNCTURE: CPT | Mod: ZL | Performed by: STUDENT IN AN ORGANIZED HEALTH CARE EDUCATION/TRAINING PROGRAM

## 2023-11-08 PROCEDURE — 84550 ASSAY OF BLOOD/URIC ACID: CPT | Mod: ZL | Performed by: STUDENT IN AN ORGANIZED HEALTH CARE EDUCATION/TRAINING PROGRAM

## 2023-11-08 PROCEDURE — 86140 C-REACTIVE PROTEIN: CPT | Mod: ZL | Performed by: STUDENT IN AN ORGANIZED HEALTH CARE EDUCATION/TRAINING PROGRAM

## 2023-11-08 PROCEDURE — 73630 X-RAY EXAM OF FOOT: CPT | Mod: RT

## 2023-11-08 RX ORDER — PREDNISONE 20 MG/1
TABLET ORAL
Qty: 15 TABLET | Refills: 0 | Status: SHIPPED | OUTPATIENT
Start: 2023-11-08 | End: 2023-11-19

## 2023-11-08 RX ORDER — PREDNISONE 20 MG/1
TABLET ORAL
Qty: 15 TABLET | Refills: 0 | Status: SHIPPED | OUTPATIENT
Start: 2023-11-08 | End: 2023-11-08

## 2023-11-08 NOTE — NURSING NOTE
"Chief Complaint   Patient presents with    Musculoskeletal Problem         Initial There were no vitals taken for this visit. Estimated body mass index is 27.1 kg/m  as calculated from the following:    Height as of an earlier encounter on 11/8/23: 1.6 m (5' 3\").    Weight as of an earlier encounter on 11/8/23: 69.4 kg (153 lb).     Advance Care Directive on file? yes  Advance Care Directive provided to patient? No     FOOD SECURITY SCREENING QUESTIONS:    The next two questions are to help us understand your food security.  If you are feeling you need any assistance in this area, we have resources available to support you today.    Hunger Vital Signs:  Within the past 12 months we worried whether our food would run out before we got money to buy more. Never  Within the past 12 months the food we bought just didn't last and we didn't have money to get more. Never  Elena Mcmahon LPN on 11/8/2023 at 3:48 PM      Elena Mcmahon     "

## 2023-11-08 NOTE — ED TRIAGE NOTES
"Patient arrives to ED from home via private vehicle (landlord) with CO of right foot pain. Pain rates pain 7/10 and states it is \"sore.\" Patient is unaware of obtaining injury and states pain started 4 days ago. Patient states he is unable to bear weight on right leg.     Triage Assessment (Adult)       Row Name 11/08/23 1215          Triage Assessment    Airway WDL WDL        Respiratory WDL    Respiratory WDL WDL        Skin Circulation/Temperature WDL    Skin Circulation/Temperature WDL X;temperature     Skin Temperature cool        Cardiac WDL    Cardiac WDL WDL        Peripheral/Neurovascular WDL    Peripheral Neurovascular WDL capillary refill     Capillary Refill, General greater than 3 secs        Cognitive/Neuro/Behavioral WDL    Cognitive/Neuro/Behavioral WDL WDL        Fine Coma Scale    Best Eye Response 4-->(E4) spontaneous     Best Motor Response 6-->(M6) obeys commands     Best Verbal Response 5-->(V5) oriented     Fine Coma Scale Score 15     Assessment Qualifiers no eye obstruction present                     "

## 2023-11-08 NOTE — PROGRESS NOTES
Assessment & Plan     (M10.9) Acute gout of right foot, unspecified cause  (primary encounter diagnosis)    Comment: Right foot swelling, most likely gout.  Uric acid elevated, CRP is elevated.  White blood cell count is normal, no evidence of fracture or dislocation on x-ray imaging.  Consider fracture or injury versus inflammatory such as gout versus infectious.  No pain or extension of symptoms into the calf that it is less likely to be DVT.  He does take Plavix, aspirin.  No other blood thinners.    Plan: predniSONE (DELTASONE) 20 MG tablet, Crutches         Order for DME - ONLY FOR DME, DISCONTINUED:         predniSONE (DELTASONE) 20 MG tablet          To treat with course of prednisone.  He was given crutches, discussed the risks of crutches including increased risk of falls.  Tylenol, no ibuprofen at this time.  Clinical handout given on gout.  Follow-up with PCP if symptoms persist.  Return to rapid clinic or go to the ER if symptoms worsen or change.  He is comfortable and agreeable with this plan.      (M79.89) Swelling of right foot    Comment: Most likely gout.    Plan: CBC and Differential, Basic Metabolic Panel,         Uric acid, XR Foot Right G/E 3 Views, CRP         inflammation            Kisha Salmeron PA-C  [facility]          Smita Alan is a 78 year old, presenting for the following health issues:  Musculoskeletal Problem      HPI     Patient presents today with concerns of right foot pain.  States it started about 4 days ago.  Has continued to swell and get more red.  Has continued to bother her more.  He is having a hard time standing on the foot.  He is unsure if there was any sort of injury to the foot.  Nothing specific that he can recall.  He has not been using over the counter medications for pain.  He has been using ice.  Denies pain into the calf.      Review of Systems   Constitutional, HEENT, cardiovascular, pulmonary, gi and gu systems are  "negative, except as otherwise noted.        Objective    /66 (BP Location: Left arm, Patient Position: Sitting, Cuff Size: Adult Regular)   Pulse 75   Temp 98.5  F (36.9  C) (Temporal)   Resp 18   Ht 1.6 m (5' 3\")   Wt 69.4 kg (153 lb)   SpO2 98%   BMI 27.10 kg/m    Body mass index is 27.1 kg/m .    Physical Exam   GENERAL: healthy, alert and no distress  RESP: lungs clear to auscultation - no rales, rhonchi or wheezes  CV: regular rate and rhythm, normal S1 S2  MS: 2+ nonpitting edema, erythema over the medial and lateral side of the top of the foot, tenderness to palpation over the top of the foot, light touch sensation is intact, distal pulses intact, decreased range of motion due to swelling and pain              "

## 2023-11-08 NOTE — ED NOTES
"Unable to obtain medication history as patient is unaware of what he takes. He states he doesn't read the label, he \"just takes them.\"  "

## 2023-11-08 NOTE — PATIENT INSTRUCTIONS
Gout    Prednisone burst and taper. Take in the morning with food, avoid NSAIDS.    Tylenol as needed.    Elevation, ice as tolerated.    Follow up with PCP if persisting.    Return to rapid clinic or ER if worsening.

## 2023-12-20 ENCOUNTER — OFFICE VISIT (OUTPATIENT)
Dept: FAMILY MEDICINE | Facility: OTHER | Age: 78
End: 2023-12-20
Attending: FAMILY MEDICINE
Payer: COMMERCIAL

## 2023-12-20 VITALS
RESPIRATION RATE: 20 BRPM | SYSTOLIC BLOOD PRESSURE: 102 MMHG | BODY MASS INDEX: 29.69 KG/M2 | HEART RATE: 83 BPM | DIASTOLIC BLOOD PRESSURE: 66 MMHG | WEIGHT: 167.6 LBS | OXYGEN SATURATION: 99 % | TEMPERATURE: 98.6 F

## 2023-12-20 DIAGNOSIS — Z00.00 ENCOUNTER FOR MEDICARE ANNUAL WELLNESS EXAM: Primary | ICD-10-CM

## 2023-12-20 DIAGNOSIS — I50.9 ACUTE ON CHRONIC CONGESTIVE HEART FAILURE, UNSPECIFIED HEART FAILURE TYPE (H): ICD-10-CM

## 2023-12-20 DIAGNOSIS — I25.10 CORONARY ARTERY DISEASE INVOLVING NATIVE CORONARY ARTERY OF NATIVE HEART WITHOUT ANGINA PECTORIS: ICD-10-CM

## 2023-12-20 DIAGNOSIS — I50.23 ACUTE ON CHRONIC SYSTOLIC CONGESTIVE HEART FAILURE (H): ICD-10-CM

## 2023-12-20 PROBLEM — R60.0 BILATERAL LOWER EXTREMITY EDEMA: Status: RESOLVED | Noted: 2022-07-24 | Resolved: 2023-12-20

## 2023-12-20 LAB
ALBUMIN SERPL BCG-MCNC: 4 G/DL (ref 3.5–5.2)
ALP SERPL-CCNC: 81 U/L (ref 40–150)
ALT SERPL W P-5'-P-CCNC: 34 U/L (ref 0–70)
ANION GAP SERPL CALCULATED.3IONS-SCNC: 9 MMOL/L (ref 7–15)
AST SERPL W P-5'-P-CCNC: 24 U/L (ref 0–45)
BASOPHILS # BLD AUTO: 0.1 10E3/UL (ref 0–0.2)
BASOPHILS NFR BLD AUTO: 1 %
BILIRUB SERPL-MCNC: 0.8 MG/DL
BUN SERPL-MCNC: 12.5 MG/DL (ref 8–23)
CALCIUM SERPL-MCNC: 9.6 MG/DL (ref 8.8–10.2)
CHLORIDE SERPL-SCNC: 100 MMOL/L (ref 98–107)
CHOLEST SERPL-MCNC: 95 MG/DL
CREAT SERPL-MCNC: 1.01 MG/DL (ref 0.67–1.17)
DEPRECATED HCO3 PLAS-SCNC: 25 MMOL/L (ref 22–29)
EGFRCR SERPLBLD CKD-EPI 2021: 77 ML/MIN/1.73M2
EOSINOPHIL # BLD AUTO: 0.2 10E3/UL (ref 0–0.7)
EOSINOPHIL NFR BLD AUTO: 3 %
ERYTHROCYTE [DISTWIDTH] IN BLOOD BY AUTOMATED COUNT: 16.7 % (ref 10–15)
FASTING STATUS PATIENT QL REPORTED: NO
GLUCOSE SERPL-MCNC: 104 MG/DL (ref 70–99)
HCT VFR BLD AUTO: 38.4 % (ref 40–53)
HDLC SERPL-MCNC: 33 MG/DL
HGB BLD-MCNC: 13 G/DL (ref 13.3–17.7)
IMM GRANULOCYTES # BLD: 0.2 10E3/UL
IMM GRANULOCYTES NFR BLD: 2 %
LDLC SERPL CALC-MCNC: 43 MG/DL
LYMPHOCYTES # BLD AUTO: 0.8 10E3/UL (ref 0.8–5.3)
LYMPHOCYTES NFR BLD AUTO: 9 %
MCH RBC QN AUTO: 30.2 PG (ref 26.5–33)
MCHC RBC AUTO-ENTMCNC: 33.9 G/DL (ref 31.5–36.5)
MCV RBC AUTO: 89 FL (ref 78–100)
MONOCYTES # BLD AUTO: 1.5 10E3/UL (ref 0–1.3)
MONOCYTES NFR BLD AUTO: 16 %
NEUTROPHILS # BLD AUTO: 6.1 10E3/UL (ref 1.6–8.3)
NEUTROPHILS NFR BLD AUTO: 69 %
NONHDLC SERPL-MCNC: 62 MG/DL
NRBC # BLD AUTO: 0 10E3/UL
NRBC BLD AUTO-RTO: 0 /100
PLATELET # BLD AUTO: 266 10E3/UL (ref 150–450)
POTASSIUM SERPL-SCNC: 4.5 MMOL/L (ref 3.4–5.3)
PROT SERPL-MCNC: 8.4 G/DL (ref 6.4–8.3)
RBC # BLD AUTO: 4.31 10E6/UL (ref 4.4–5.9)
SODIUM SERPL-SCNC: 134 MMOL/L (ref 135–145)
TRIGL SERPL-MCNC: 93 MG/DL
WBC # BLD AUTO: 8.9 10E3/UL (ref 4–11)

## 2023-12-20 PROCEDURE — 80061 LIPID PANEL: CPT | Mod: ZL | Performed by: FAMILY MEDICINE

## 2023-12-20 PROCEDURE — 85025 COMPLETE CBC W/AUTO DIFF WBC: CPT | Mod: ZL | Performed by: FAMILY MEDICINE

## 2023-12-20 PROCEDURE — G0463 HOSPITAL OUTPT CLINIC VISIT: HCPCS | Mod: 25

## 2023-12-20 PROCEDURE — 36415 COLL VENOUS BLD VENIPUNCTURE: CPT | Mod: ZL | Performed by: FAMILY MEDICINE

## 2023-12-20 PROCEDURE — G0439 PPPS, SUBSEQ VISIT: HCPCS | Performed by: FAMILY MEDICINE

## 2023-12-20 PROCEDURE — 80053 COMPREHEN METABOLIC PANEL: CPT | Mod: ZL | Performed by: FAMILY MEDICINE

## 2023-12-20 RX ORDER — FUROSEMIDE 40 MG
TABLET ORAL
Qty: 450 TABLET | Refills: 4 | Status: SHIPPED | OUTPATIENT
Start: 2023-12-20

## 2023-12-20 RX ORDER — SPIRONOLACTONE 25 MG/1
25 TABLET ORAL DAILY
Qty: 90 TABLET | Refills: 4 | Status: SHIPPED | OUTPATIENT
Start: 2023-12-20 | End: 2024-02-05

## 2023-12-20 RX ORDER — METOPROLOL SUCCINATE 100 MG/1
100 TABLET, EXTENDED RELEASE ORAL DAILY
Qty: 90 TABLET | Refills: 3 | Status: SHIPPED | OUTPATIENT
Start: 2023-12-20 | End: 2024-02-05

## 2023-12-20 RX ORDER — CLOPIDOGREL BISULFATE 75 MG/1
75 TABLET ORAL DAILY
Qty: 90 TABLET | Refills: 4 | Status: SHIPPED | OUTPATIENT
Start: 2023-12-20

## 2023-12-20 RX ORDER — ATORVASTATIN CALCIUM 40 MG/1
40 TABLET, FILM COATED ORAL AT BEDTIME
Qty: 90 TABLET | Refills: 4 | Status: SHIPPED | OUTPATIENT
Start: 2023-12-20

## 2023-12-20 RX ORDER — LOSARTAN POTASSIUM 25 MG/1
25 TABLET ORAL AT BEDTIME
COMMUNITY
Start: 2023-11-08

## 2023-12-20 RX ORDER — PREDNISONE 20 MG/1
20 TABLET ORAL DAILY
COMMUNITY
End: 2023-12-20

## 2023-12-20 ASSESSMENT — ENCOUNTER SYMPTOMS
SHORTNESS OF BREATH: 1
HEADACHES: 0
MYALGIAS: 0
FREQUENCY: 1
ABDOMINAL PAIN: 0
DYSURIA: 0
CONSTIPATION: 0
NERVOUS/ANXIOUS: 0
HEMATURIA: 0
DIARRHEA: 0
NAUSEA: 0
DIZZINESS: 0
CHILLS: 1
WEAKNESS: 0
PARESTHESIAS: 0
HEMATOCHEZIA: 0
SORE THROAT: 0
FEVER: 0
EYE PAIN: 0
HEARTBURN: 0
ARTHRALGIAS: 1
PALPITATIONS: 0
JOINT SWELLING: 0
COUGH: 1

## 2023-12-20 ASSESSMENT — ACTIVITIES OF DAILY LIVING (ADL): CURRENT_FUNCTION: NO ASSISTANCE NEEDED

## 2023-12-20 ASSESSMENT — PAIN SCALES - GENERAL: PAINLEVEL: NO PAIN (0)

## 2023-12-20 NOTE — LETTER
December 21, 2023      Waqas GAMING Kasie  [address] [place] [address]        Dear ,    We are writing to inform you of your test results.    Your test results fall within the expected range(s) or remain unchanged from previous results.  Please continue with current treatment plan.    Resulted Orders   Comprehensive Metabolic Panel   Result Value Ref Range    Sodium 134 (L) 135 - 145 mmol/L      Comment:      Reference intervals for this test were updated on 09/26/2023 to more accurately reflect our healthy population. There may be differences in the flagging of prior results with similar values performed with this method. Interpretation of those prior results can be made in the context of the updated reference intervals.     Potassium 4.5 3.4 - 5.3 mmol/L    Carbon Dioxide (CO2) 25 22 - 29 mmol/L    Anion Gap 9 7 - 15 mmol/L    Urea Nitrogen 12.5 8.0 - 23.0 mg/dL    Creatinine 1.01 0.67 - 1.17 mg/dL    GFR Estimate 77 >60 mL/min/1.73m2    Calcium 9.6 8.8 - 10.2 mg/dL    Chloride 100 98 - 107 mmol/L    Glucose 104 (H) 70 - 99 mg/dL    Alkaline Phosphatase 81 40 - 150 U/L      Comment:      Reference intervals for this test were updated on 11/14/2023 to more accurately reflect our healthy population. There may be differences in the flagging of prior results with similar values performed with this method. Interpretation of those prior results can be made in the context of the updated reference intervals.    AST 24 0 - 45 U/L      Comment:      Reference intervals for this test were updated on 6/12/2023 to more accurately reflect our healthy population. There may be differences in the flagging of prior results with similar values performed with this method. Interpretation of those prior results can be made in the context of the updated reference intervals.    ALT 34 0 - 70 U/L      Comment:      Reference intervals for this test were updated on 6/12/2023 to more accurately reflect our healthy  population. There may be differences in the flagging of prior results with similar values performed with this method. Interpretation of those prior results can be made in the context of the updated reference intervals.      Protein Total 8.4 (H) 6.4 - 8.3 g/dL    Albumin 4.0 3.5 - 5.2 g/dL    Bilirubin Total 0.8 <=1.2 mg/dL   Lipid Panel   Result Value Ref Range    Cholesterol 95 <200 mg/dL    Triglycerides 93 <150 mg/dL    Direct Measure HDL 33 (L) >=40 mg/dL    LDL Cholesterol Calculated 43 <=100 mg/dL    Non HDL Cholesterol 62 <130 mg/dL    Patient Fasting > 8hrs? No     Narrative    Cholesterol  Desirable:  <200 mg/dL    Triglycerides  Normal:  Less than 150 mg/dL  Borderline High:  150-199 mg/dL  High:  200-499 mg/dL  Very High:  Greater than or equal to 500 mg/dL    Direct Measure HDL  Female:  Greater than or equal to 50 mg/dL   Male:  Greater than or equal to 40 mg/dL    LDL Cholesterol  Desirable:  <100mg/dL  Above Desirable:  100-129 mg/dL   Borderline High:  130-159 mg/dL   High:  160-189 mg/dL   Very High:  >= 190 mg/dL    Non HDL Cholesterol  Desirable:  130 mg/dL  Above Desirable:  130-159 mg/dL  Borderline High:  160-189 mg/dL  High:  190-219 mg/dL  Very High:  Greater than or equal to 220 mg/dL   CBC with platelets and differential   Result Value Ref Range    WBC Count 8.9 4.0 - 11.0 10e3/uL    RBC Count 4.31 (L) 4.40 - 5.90 10e6/uL    Hemoglobin 13.0 (L) 13.3 - 17.7 g/dL    Hematocrit 38.4 (L) 40.0 - 53.0 %    MCV 89 78 - 100 fL    MCH 30.2 26.5 - 33.0 pg    MCHC 33.9 31.5 - 36.5 g/dL    RDW 16.7 (H) 10.0 - 15.0 %    Platelet Count 266 150 - 450 10e3/uL    % Neutrophils 69 %    % Lymphocytes 9 %    % Monocytes 16 %    % Eosinophils 3 %    % Basophils 1 %    % Immature Granulocytes 2 %    NRBCs per 100 WBC 0 <1 /100    Absolute Neutrophils 6.1 1.6 - 8.3 10e3/uL    Absolute Lymphocytes 0.8 0.8 - 5.3 10e3/uL    Absolute Monocytes 1.5 (H) 0.0 - 1.3 10e3/uL    Absolute Eosinophils 0.2 0.0 - 0.7 10e3/uL     Absolute Basophils 0.1 0.0 - 0.2 10e3/uL    Absolute Immature Granulocytes 0.2 <=0.4 10e3/uL    Absolute NRBCs 0.0 10e3/uL       If you have any questions or concerns, please call the clinic at the number listed above.       Sincerely,      Hugo Hannah MD

## 2023-12-20 NOTE — PATIENT INSTRUCTIONS
Patient Education   Personalized Prevention Plan  You are due for the preventive services outlined below.  Your care team is available to assist you in scheduling these services.  If you have already completed any of these items, please share that information with your care team to update in your medical record.  Health Maintenance Due   Topic Date Due     Heart Failure Action Plan  Never done     Discuss Advance Care Planning  Never done     Diptheria Tetanus Pertussis (DTAP/TDAP/TD) Vaccine (1 - Tdap) Never done     RSV VACCINE (Pregnancy & 60+) (1 - 1-dose 60+ series) Never done     Annual Wellness Visit  Never done     Liver Monitoring Lab  08/01/2023     Cholesterol Lab  08/01/2023     COVID-19 Vaccine (2 - 2023-24 season) 09/01/2023     Zoster (Shingles) Vaccine (3 of 3) 07/12/2023

## 2023-12-20 NOTE — PROGRESS NOTES
"SUBJECTIVE:   Waqas is a 78 year old, presenting for the following:  Wellness Visit        Are you in the first 12 months of your Medicare coverage?  No    Patient arrives here for medication renewal.  His past medical history is significant for congestive heart failure.  Coronary artery disease.  His last visit to cardiology was about 3 years ago.  Most recent echo does show a ejection fraction of 22%.  Patient is on large doses of Lasix.  Seems to be tolerating it well.  He is also on Plavix.  I did see a note from cardiology that this should be done indefinitely.  [facility]    Healthy Habits:     In general, how would you rate your overall health?  Good    Frequency of exercise:  2-3 days/week    Duration of exercise:  15-30 minutes    Do you usually eat at least 4 servings of fruit and vegetables a day, include whole grains    & fiber and avoid regularly eating high fat or \"junk\" foods?  Yes    Taking medications regularly:  Yes    Medication side effects:  None    Ability to successfully perform activities of daily living:  No assistance needed    Home Safety:  No safety concerns identified    Hearing Impairment:  Difficulty following a conversation in a noisy restaurant or crowded room    In the past 6 months, have you been bothered by leaking of urine?  No    In general, how would you rate your overall mental or emotional health?  Good    Additional concerns today:  No      Today's PHQ-2 Score:       12/20/2023     2:47 PM   PHQ-2 ( 1999 [employer])   Q1: Little interest or pleasure in doing things 0   Q2: Feeling down, depressed or hopeless 0   PHQ-2 Score 0   Q1: Little interest or pleasure in doing things Not at all   Q2: Feeling down, depressed or hopeless Not at all   PHQ-2 Score 0           Have you ever done Advance Care Planning? (For example, a Health Directive, POLST, or a discussion with a medical provider or your loved ones about your wishes): No, advance care planning information given " to patient to review.  Patient declined advance care planning discussion at this time.       Fall risk  Fallen 2 or more times in the past year?: No  Any fall with injury in the past year?: No    Cognitive Screening   1) Repeat 3 items (Leader, Season, Table)    2) Clock draw: NORMAL  3) 3 item recall: Recalls 1 object   Results: NORMAL clock, 1-2 items recalled: COGNITIVE IMPAIRMENT LESS LIKELY    Mini-CogTM Copyright GERSON Ortiz. Licensed by the author for use in [facility]; reprinted with permission (tammy@[email or web address]). All rights reserved.      Do you have sleep apnea, excessive snoring or daytime drowsiness? : no    Reviewed and updated as needed this visit by clinical staff   Tobacco  Allergies  Meds              Reviewed and updated as needed this visit by Provider                 Social History     Tobacco Use     Smoking status: Former     Packs/day: 4.00     Years: 20.00     Additional pack years: 0.00     Total pack years: 80.00     Types: Cigarettes     Quit date:      Years since quittin.9     Smokeless tobacco: Never   Substance Use Topics     Alcohol use: Yes     Alcohol/week: 2.0 standard drinks of alcohol     Types: 2 Cans of beer per week     Comment: 4 a month              2023     2:52 PM   Alcohol Use   Prescreen: >3 drinks/day or >7 drinks/week? No     Do you have a current opioid prescription? No  Do you use any other controlled substances or medications that are not prescribed by a provider? None              Current providers sharing in care for this patient include:   Patient Care Team:  Hugo Hannah MD as PCP - General (Family Practice)  Hugo Hannah MD as Assigned PCP    The following health maintenance items are reviewed in Epic and correct as of today:  Health Maintenance   Topic Date Due     HF ACTION PLAN  Never done     ADVANCE CARE PLANNING  Never done     DTAP/TDAP/TD IMMUNIZATION (1 - Tdap) Never done     RSV VACCINE (Pregnancy & 60+) (1 - 1-dose  "60+ series) Never done     MEDICARE ANNUAL WELLNESS VISIT  Never done     ALT  08/01/2023     LIPID  08/01/2023     COVID-19 Vaccine (2 - 2023-24 season) 09/01/2023     ZOSTER IMMUNIZATION (3 of 3) 07/12/2023     BMP  05/08/2024     CBC  11/08/2024     FALL RISK ASSESSMENT  12/20/2024     TSH W/FREE T4 REFLEX  Completed     HEPATITIS C SCREENING  Completed     PHQ-2 (once per calendar year)  Completed     INFLUENZA VACCINE  Completed     Pneumococcal Vaccine: 65+ Years  Completed     IPV IMMUNIZATION  Aged Out     HPV IMMUNIZATION  Aged Out     MENINGITIS IMMUNIZATION  Aged Out     RSV MONOCLONAL ANTIBODY  Aged Out     Lab work is in process          Review of Systems   Constitutional:  Positive for chills. Negative for fever.   HENT:  Positive for congestion and hearing loss. Negative for ear pain and sore throat.    Eyes:  Negative for pain and visual disturbance.   Respiratory:  Positive for cough and shortness of breath.    Cardiovascular:  Positive for peripheral edema. Negative for chest pain and palpitations.   Gastrointestinal:  Negative for abdominal pain, constipation, diarrhea, heartburn, hematochezia and nausea.   Genitourinary:  Positive for frequency. Negative for dysuria, genital sores, hematuria, impotence, penile discharge and urgency.   Musculoskeletal:  Positive for arthralgias. Negative for joint swelling and myalgias.   Skin:  Negative for rash.   Neurological:  Negative for dizziness, weakness, headaches and paresthesias.   Psychiatric/Behavioral:  Negative for mood changes. The patient is not nervous/anxious.          OBJECTIVE:   /66   Pulse 83   Temp 98.6  F (37  C)   Resp 20   Wt 76 kg (167 lb 9.6 oz)   SpO2 99%   BMI 29.69 kg/m   Estimated body mass index is 29.69 kg/m  as calculated from the following:    Height as of 11/8/23: 1.6 m (5' 3\").    Weight as of this encounter: 76 kg (167 lb 9.6 oz).  Physical Exam  Constitutional:       Appearance: He is obese.   HENT:      Head: " Normocephalic.   Cardiovascular:      Rate and Rhythm: Normal rate and regular rhythm.   Pulmonary:      Effort: Pulmonary effort is normal.      Breath sounds: Normal breath sounds.   Neurological:      Mental Status: He is alert.   Psychiatric:         Mood and Affect: Mood normal.       GENERAL: healthy, alert and no distress  NECK: no adenopathy, no asymmetry, masses, or scars and thyroid normal to palpation  RESP: lungs clear to auscultation - no rales, rhonchi or wheezes  CV: regular rate and rhythm, normal S1 S2, no S3 or S4, no murmur, click or rub, no peripheral edema and peripheral pulses strong  ABDOMEN: soft, nontender, no hepatosplenomegaly, no masses and bowel sounds normal  MS: no gross musculoskeletal defects noted, no edema    Diagnostic Test Results:  Labs reviewed in Epic  Results for orders placed or performed in visit on 12/20/23 (from the past 24 hour(s))   Comprehensive Metabolic Panel   Result Value Ref Range    Sodium 134 (L) 135 - 145 mmol/L    Potassium 4.5 3.4 - 5.3 mmol/L    Carbon Dioxide (CO2) 25 22 - 29 mmol/L    Anion Gap 9 7 - 15 mmol/L    Urea Nitrogen 12.5 8.0 - 23.0 mg/dL    Creatinine 1.01 0.67 - 1.17 mg/dL    GFR Estimate 77 >60 mL/min/1.73m2    Calcium 9.6 8.8 - 10.2 mg/dL    Chloride 100 98 - 107 mmol/L    Glucose 104 (H) 70 - 99 mg/dL    Alkaline Phosphatase 81 40 - 150 U/L    AST 24 0 - 45 U/L    ALT 34 0 - 70 U/L    Protein Total 8.4 (H) 6.4 - 8.3 g/dL    Albumin 4.0 3.5 - 5.2 g/dL    Bilirubin Total 0.8 <=1.2 mg/dL   CBC and Differential    Narrative    The following orders were created for panel order CBC and Differential.  Procedure                               Abnormality         Status                     ---------                               -----------         ------                     CBC with platelets and d...[[id number]]  Abnormal            Final result                 Please view results for these tests on the individual orders.   Lipid Panel   Result Value  Ref Range    Cholesterol 95 <200 mg/dL    Triglycerides 93 <150 mg/dL    Direct Measure HDL 33 (L) >=40 mg/dL    LDL Cholesterol Calculated 43 <=100 mg/dL    Non HDL Cholesterol 62 <130 mg/dL    Patient Fasting > 8hrs? No     Narrative    Cholesterol  Desirable:  <200 mg/dL    Triglycerides  Normal:  Less than 150 mg/dL  Borderline High:  150-199 mg/dL  High:  200-499 mg/dL  Very High:  Greater than or equal to 500 mg/dL    Direct Measure HDL  Female:  Greater than or equal to 50 mg/dL   Male:  Greater than or equal to 40 mg/dL    LDL Cholesterol  Desirable:  <100mg/dL  Above Desirable:  100-129 mg/dL   Borderline High:  130-159 mg/dL   High:  160-189 mg/dL   Very High:  >= 190 mg/dL    Non HDL Cholesterol  Desirable:  130 mg/dL  Above Desirable:  130-159 mg/dL  Borderline High:  160-189 mg/dL  High:  190-219 mg/dL  Very High:  Greater than or equal to 220 mg/dL   CBC with platelets and differential   Result Value Ref Range    WBC Count 8.9 4.0 - 11.0 10e3/uL    RBC Count 4.31 (L) 4.40 - 5.90 10e6/uL    Hemoglobin 13.0 (L) 13.3 - 17.7 g/dL    Hematocrit 38.4 (L) 40.0 - 53.0 %    MCV 89 78 - 100 fL    MCH 30.2 26.5 - 33.0 pg    MCHC 33.9 31.5 - 36.5 g/dL    RDW 16.7 (H) 10.0 - 15.0 %    Platelet Count 266 150 - 450 10e3/uL    % Neutrophils 69 %    % Lymphocytes 9 %    % Monocytes 16 %    % Eosinophils 3 %    % Basophils 1 %    % Immature Granulocytes 2 %    NRBCs per 100 WBC 0 <1 /100    Absolute Neutrophils 6.1 1.6 - 8.3 10e3/uL    Absolute Lymphocytes 0.8 0.8 - 5.3 10e3/uL    Absolute Monocytes 1.5 (H) 0.0 - 1.3 10e3/uL    Absolute Eosinophils 0.2 0.0 - 0.7 10e3/uL    Absolute Basophils 0.1 0.0 - 0.2 10e3/uL    Absolute Immature Granulocytes 0.2 <=0.4 10e3/uL    Absolute NRBCs 0.0 10e3/uL       ASSESSMENT / PLAN:   (Z00.00) Encounter for Medicare annual wellness exam  (primary encounter diagnosis)  Comment: Will also set the patient up with cardiology consult  Plan: CBC and Differential            (I50.23) Acute on  "chronic systolic congestive heart failure (H)  Comment: Medication refill.  Plan: furosemide (LASIX) 40 MG tablet            (I50.9) Acute on chronic congestive heart failure, unspecified heart failure type (H)  Comment:   Plan: metoprolol succinate ER (TOPROL XL) 100 MG 24         hr tablet, atorvastatin (LIPITOR) 40 MG tablet,        spironolactone (ALDACTONE) 25 MG tablet,         Comprehensive Metabolic Panel, CBC and         Differential, Lipid Panel            (I25.10) Coronary artery disease involving native coronary artery of native heart without angina pectoris  Comment:   Plan: metoprolol succinate ER (TOPROL XL) 100 MG 24         hr tablet, clopidogrel (PLAVIX) 75 MG tablet                  COUNSELING:        BMI:   Estimated body mass index is 29.69 kg/m  as calculated from the following:    Height as of 11/8/23: 1.6 m (5' 3\").    Weight as of this encounter: 76 kg (167 lb 9.6 oz).         He reports that he quit smoking about 43 years ago. His smoking use included cigarettes. He has a 80 pack-year smoking history. He has never used smokeless tobacco.      Appropriate preventive services were discussed with this patient, including applicable screening as appropriate for fall prevention, nutrition, physical activity, Tobacco-use cessation, weight loss and cognition.  Checklist reviewing preventive services available has been given to the patient.    Reviewed patients plan of care and provided an AVS. The  casi Alan meets the Care Plan requirement. This Care Plan has been established and reviewed with the .        Hugo Hannah MD  [facility]    Identified Health Risks:    "

## 2023-12-21 PROBLEM — N50.89 SCROTAL SWELLING: Status: RESOLVED | Noted: 2022-07-24 | Resolved: 2023-12-21

## 2024-02-05 ENCOUNTER — OFFICE VISIT (OUTPATIENT)
Dept: CARDIOLOGY | Facility: OTHER | Age: 79
End: 2024-02-05
Attending: NURSE PRACTITIONER
Payer: COMMERCIAL

## 2024-02-05 VITALS
RESPIRATION RATE: 14 BRPM | HEIGHT: 63 IN | TEMPERATURE: 97.7 F | HEART RATE: 82 BPM | SYSTOLIC BLOOD PRESSURE: 84 MMHG | BODY MASS INDEX: 28.28 KG/M2 | OXYGEN SATURATION: 98 % | WEIGHT: 159.6 LBS | DIASTOLIC BLOOD PRESSURE: 64 MMHG

## 2024-02-05 DIAGNOSIS — Z95.5 HISTORY OF CORONARY ARTERY STENT PLACEMENT: ICD-10-CM

## 2024-02-05 DIAGNOSIS — I50.22 CHRONIC SYSTOLIC HEART FAILURE (H): ICD-10-CM

## 2024-02-05 DIAGNOSIS — I95.2 HYPOTENSION DUE TO MEDICATION: ICD-10-CM

## 2024-02-05 DIAGNOSIS — I25.5 ISCHEMIC CARDIOMYOPATHY: Primary | ICD-10-CM

## 2024-02-05 DIAGNOSIS — I25.10 CORONARY ARTERY DISEASE INVOLVING NATIVE CORONARY ARTERY OF NATIVE HEART WITHOUT ANGINA PECTORIS: ICD-10-CM

## 2024-02-05 LAB
ANION GAP SERPL CALCULATED.3IONS-SCNC: 11 MMOL/L (ref 7–15)
ATRIAL RATE - MUSE: 82 BPM
BUN SERPL-MCNC: 23.6 MG/DL (ref 8–23)
CALCIUM SERPL-MCNC: 9.6 MG/DL (ref 8.8–10.2)
CHLORIDE SERPL-SCNC: 97 MMOL/L (ref 98–107)
CREAT SERPL-MCNC: 1.53 MG/DL (ref 0.67–1.17)
DEPRECATED HCO3 PLAS-SCNC: 27 MMOL/L (ref 22–29)
DIASTOLIC BLOOD PRESSURE - MUSE: NORMAL MMHG
EGFRCR SERPLBLD CKD-EPI 2021: 47 ML/MIN/1.73M2
GLUCOSE SERPL-MCNC: 115 MG/DL (ref 70–99)
INTERPRETATION ECG - MUSE: NORMAL
NT-PROBNP SERPL-MCNC: 788 PG/ML (ref 0–1800)
P AXIS - MUSE: 69 DEGREES
POTASSIUM SERPL-SCNC: 4.2 MMOL/L (ref 3.4–5.3)
PR INTERVAL - MUSE: 188 MS
QRS DURATION - MUSE: 94 MS
QT - MUSE: 402 MS
QTC - MUSE: 469 MS
R AXIS - MUSE: -42 DEGREES
SODIUM SERPL-SCNC: 135 MMOL/L (ref 135–145)
SYSTOLIC BLOOD PRESSURE - MUSE: NORMAL MMHG
T AXIS - MUSE: 113 DEGREES
VENTRICULAR RATE- MUSE: 82 BPM

## 2024-02-05 PROCEDURE — 36415 COLL VENOUS BLD VENIPUNCTURE: CPT | Mod: ZL | Performed by: NURSE PRACTITIONER

## 2024-02-05 PROCEDURE — 99204 OFFICE O/P NEW MOD 45 MIN: CPT | Performed by: NURSE PRACTITIONER

## 2024-02-05 PROCEDURE — 93005 ELECTROCARDIOGRAM TRACING: CPT | Performed by: NURSE PRACTITIONER

## 2024-02-05 PROCEDURE — 93010 ELECTROCARDIOGRAM REPORT: CPT | Performed by: INTERNAL MEDICINE

## 2024-02-05 PROCEDURE — 83880 ASSAY OF NATRIURETIC PEPTIDE: CPT | Mod: ZL | Performed by: NURSE PRACTITIONER

## 2024-02-05 PROCEDURE — G0463 HOSPITAL OUTPT CLINIC VISIT: HCPCS

## 2024-02-05 PROCEDURE — 82374 ASSAY BLOOD CARBON DIOXIDE: CPT | Mod: ZL | Performed by: NURSE PRACTITIONER

## 2024-02-05 RX ORDER — SPIRONOLACTONE 25 MG/1
12.5 TABLET ORAL DAILY
Qty: 45 TABLET | Refills: 3 | Status: SHIPPED | OUTPATIENT
Start: 2024-02-05

## 2024-02-05 RX ORDER — NITROGLYCERIN 0.4 MG/1
TABLET SUBLINGUAL
Qty: 6 TABLET | Refills: 11 | Status: SHIPPED | OUTPATIENT
Start: 2024-02-05

## 2024-02-05 ASSESSMENT — PAIN SCALES - GENERAL: PAINLEVEL: NO PAIN (0)

## 2024-02-05 NOTE — PROGRESS NOTES
[facility] HEART CARE   CARDIOLOGY CONSULT     Waqas Ferro   [address] [place] [address]      Hugo Hannah     Chief Complaint   Patient presents with    Referral     Dr Hannah for CHF on 12/202/2023         HPI:   Mr. Ferro is a 78 year old male who presents for cardiology evaluation with known severe ischemic CM, LVEF <30%with history of CAD. Previously followed cardiology through [facility], last visit in June 2021.    Patient was hospitalized in July 2022 last for CHF exacerbation. TTE at that time revealing LVEF <30%.    Patient with known CAD, ischemic CM. In review of notes from [facility] (June 2021) he has been documented to have severe CAD with chronic LAD occlusion.  Left heart cath in March 2019 requiring Impella CP support with rotational arthrectomy of the ostial LAD.  PCI of the ostial LAD and distal LAD, additionally PCI of the ostial RI. From a heart failure standpoint, he has continued on guideline directed medical therapy, limited due to hypotension.  He has had narrow complex QRS for which she is not a candidate for CRT, had also noted some nonviable tissue- did not support benefit for CRT.  It was also documented that patient had declined ICD for primary prevention.    Patient is currently on ASA 81 mg daily, Atorvastatin 40 mg nightly, Plavix 75 mg daily continued for complex CAD. Lasix dose is currently at 120 mg in the AM and 80 mg in the evening, Losartan 25 mg at bedtime and Spironolactone 25 mg daily. He was previously also on beta blocker Toprol  mg daily which he has not been taking. Admits this was discontinued a few months ago due to low blood pressure and suspects low heart rate at that time.     Patient describes himself as largely asymptomatic with severe heart failure.  He has not experienced any anginal symptoms in the setting of ischemic heart disease.  He denies any chest pain or pressure, no recent nitroglycerin use.  He has not experienced any  dyspnea increased at rest or with exertion.  He is hypotensive on arrival to clinic today and denies any symptoms of lightheadedness or orthostatic lightheadedness related to this.  He has not experienced any palpitations or racing heart.  No syncope events.  He denies noticing any increased edema or weight gain.  He has no specific concerns today.    RELEVANT TESTING REVIEWED:  Echocardiogram 2022  Interpretation Summary  Biplane LVEF is 22%.  Akinetic apical segments.  Right ventricular function, chamber size, wall motion, and thickness are  normal.  Right ventricular systolic pressure is 47mmHg above the right atrial pressure.  IVC diameter >2.1 cm collapsing <50% with sniff suggests a high RA pressure  estimated at 15 mmHg or greater.  No pericardial effusion is present.  There is no prior study for direct comparison.    PAST MEDICAL HISTORY:   Past Medical History:   Diagnosis Date    Coronary artery disease involving native coronary artery of native heart without angina pectoris     Ischemic cardiomyopathy           FAMILY HISTORY:   Family History   Problem Relation Age of Onset    Alcoholism Father     Cancer Sister     Coronary Artery Disease Brother           PAST SURGICAL HISTORY:   Past Surgical History:   Procedure Laterality Date    HEART CATH STENT COR W/WO PTCA      chronic LAD occlusion          SOCIAL HISTORY:   Social History     Socioeconomic History    Marital status: Single     Spouse name: None    Number of children: None    Years of education: None    Highest education level: High school graduate   Occupational History    Occupation: Other Machine     Employer: [employer]     Comment: Retired   Tobacco Use    Smoking status: Former     Packs/day: 4.00     Years: 20.00     Additional pack years: 0.00     Total pack years: 80.00     Types: Cigarettes     Start date:      Quit date: 1980     Years since quittin.1    Smokeless tobacco: Never   Vaping Use    Vaping Use: Never used    Substance and Sexual Activity    Alcohol use: Yes     Alcohol/week: 2.0 standard drinks of alcohol     Types: 2 Cans of beer per week     Comment: 4 a month     Drug use: No    Sexual activity: Not Currently     Partners: Female     Social Determinants of Health     Financial Resource Strain: Medium Risk (4/9/2019)    Overall Financial Resource Strain (CARDIA)     Difficulty of Paying Living Expenses: Somewhat hard   Food Insecurity: No Food Insecurity (4/9/2019)    Hunger Vital Sign     Worried About Running Out of Food in the Last Year: Never true     Ran Out of Food in the Last Year: Never true   Transportation Needs: No Transportation Needs (4/9/2019)    PRAPARE - Transportation     Lack of Transportation (Medical): No     Lack of Transportation (Non-Medical): No   Physical Activity: Inactive (4/9/2019)    Exercise Vital Sign     Days of Exercise per Week: 0 days     Minutes of Exercise per Session: 0 min   Stress: Stress Concern Present (4/9/2019)    [employer] - Occupational Stress Questionnaire     Feeling of Stress : To some extent   Social Connections: Unknown (4/9/2019)    Social Connection and Isolation Panel [NHANES]     Frequency of Communication with Friends and Family: Once a week     Frequency of Social Gatherings with Friends and Family: Once a week     Attends Jewish Services: Never     Active Member of Clubs or Organizations: No     Attends Club or Organization Meetings: Not asked   Interpersonal Safety: Low Risk  (2/5/2024)    Interpersonal Safety     Do you feel physically and emotionally safe where you currently live?: Yes     Within the past 12 months, have you been hit, slapped, kicked or otherwise physically hurt by someone?: No     Within the past 12 months, have you been humiliated or emotionally abused in other ways by your partner or ex-partner?: No          CURRENT MEDICATIONS:   Current Outpatient Medications   Medication    aspirin (ASA) 81 MG  "chewable tablet    atorvastatin (LIPITOR) 40 MG tablet    clopidogrel (PLAVIX) 75 MG tablet    furosemide (LASIX) 40 MG tablet    losartan (COZAAR) 25 MG tablet    nitroGLYcerin (NITROSTAT) 0.4 MG sublingual tablet    spironolactone (ALDACTONE) 25 MG tablet     No current facility-administered medications for this visit.      ALLERGIES:   No Known Allergies       ROS:   CONSTITUTIONAL: No reported fever or chills. No changes in weight.  ENT: No visual disturbance, ear ache, epistaxis or sore throat.   CARDIOVASCULAR: No chest pain, chest pressure or chest discomfort. No palpitations or lower extremity edema.   RESPIRATORY: No shortness of breath, dyspnea upon exertion, cough, wheezing or hemoptysis.  No orthopnea or PND.  GI: No reported abdominal pain, melena or hematochezia.   : No reported hematuria or dysuria.   NEUROLOGICAL: No lightheadedness, dizziness, syncope, ataxia, paresthesias or weakness.  No orthostatic lightheadedness.  HEMATOLOGIC: No history of anemia. No bleeding. Describes easy bruising on daily aspirin and clopidogrel-he has tolerated dual antiplatelet therapy without complication. No history of blood clots.   MUSCULOSKELETAL: No new joint pain or swelling, no muscle pain.  ENDOCRINOLOGIC: No temperature intolerance. No hair or skin changes.  SKIN: No abnormal rashes or sores, no unusual itching.      PHYSICAL EXAM:   BP (!) 84/64 (BP Location: Right arm, Patient Position: Sitting, Cuff Size: Adult Large)   Pulse 82   Temp 97.7  F (36.5  C) (Temporal)   Resp 14   Ht 1.6 m (5' 3\")   Wt 72.4 kg (159 lb 9.6 oz)   SpO2 98%   BMI 28.27 kg/m    GENERAL: The patient is a well-developed, well-nourished, in no apparent distress.  HEENT: Head is normocephalic and atraumatic. Eyes are symmetrical with normal visual tracking. No icterus, no xanthelasmas. Nares appeared normal without nasal drainage. Mucous membranes are moist, no cyanosis.  NECK: Supple, no cervical bruits, JVP not visible. "   CHEST/ LUNGS: Lungs clear to auscultation, no rales, rhonchi or wheezes, no use of accessory muscles, no retractions, respirations unlabored and normal respiratory rate.   CARDIO: Regular rate and rhythm normal with S1 and S2, no S3 or S4 and no murmur, click or rub.   ABD: Abdomen is nondistended.   EXTREMITIES: No edema present.   MUSCULOSKELETAL: No visible joint swelling.   NEUROLOGIC: Alert and oriented X3. Normal speech, gait and affect. No focal neurologic deficits.   SKIN: No jaundice. No rashes or visible skin lesions present. No ecchymosis.     EKG:    NSR, rate 82 bpm, left axis deviation.  Past lateral infarct.  QRS duration 94 ms.     LAB RESULTS:   Office Visit on 12/20/2023   Component Date Value Ref Range Status    Sodium 12/20/2023 134 (L)  135 - 145 mmol/L Final    Potassium 12/20/2023 4.5  3.4 - 5.3 mmol/L Final    Carbon Dioxide (CO2) 12/20/2023 25  22 - 29 mmol/L Final    Anion Gap 12/20/2023 9  7 - 15 mmol/L Final    Urea Nitrogen 12/20/2023 12.5  8.0 - 23.0 mg/dL Final    Creatinine 12/20/2023 1.01  0.67 - 1.17 mg/dL Final    GFR Estimate 12/20/2023 77  >60 mL/min/1.73m2 Final    Calcium 12/20/2023 9.6  8.8 - 10.2 mg/dL Final    Chloride 12/20/2023 100  98 - 107 mmol/L Final    Glucose 12/20/2023 104 (H)  70 - 99 mg/dL Final    Alkaline Phosphatase 12/20/2023 81  40 - 150 U/L Final    AST 12/20/2023 24  0 - 45 U/L Final    ALT 12/20/2023 34  0 - 70 U/L Final    Protein Total 12/20/2023 8.4 (H)  6.4 - 8.3 g/dL Final    Albumin 12/20/2023 4.0  3.5 - 5.2 g/dL Final    Bilirubin Total 12/20/2023 0.8  <=1.2 mg/dL Final    Cholesterol 12/20/2023 95  <200 mg/dL Final    Triglycerides 12/20/2023 93  <150 mg/dL Final    Direct Measure HDL 12/20/2023 33 (L)  >=40 mg/dL Final    LDL Cholesterol Calculated 12/20/2023 43  <=100 mg/dL Final    Non HDL Cholesterol 12/20/2023 62  <130 mg/dL Final    Patient Fasting > 8hrs? 12/20/2023 No   Final    WBC Count 12/20/2023 8.9  4.0 - 11.0 10e3/uL Final    RBC  Count 12/20/2023 4.31 (L)  4.40 - 5.90 10e6/uL Final    Hemoglobin 12/20/2023 13.0 (L)  13.3 - 17.7 g/dL Final    Hematocrit 12/20/2023 38.4 (L)  40.0 - 53.0 % Final    MCV 12/20/2023 89  78 - 100 fL Final    MCH 12/20/2023 30.2  26.5 - 33.0 pg Final    MCHC 12/20/2023 33.9  31.5 - 36.5 g/dL Final    RDW 12/20/2023 16.7 (H)  10.0 - 15.0 % Final    Platelet Count 12/20/2023 266  150 - 450 10e3/uL Final    % Neutrophils 12/20/2023 69  % Final    % Lymphocytes 12/20/2023 9  % Final    % Monocytes 12/20/2023 16  % Final    % Eosinophils 12/20/2023 3  % Final    % Basophils 12/20/2023 1  % Final    % Immature Granulocytes 12/20/2023 2  % Final    NRBCs per 100 WBC 12/20/2023 0  <1 /100 Final    Absolute Neutrophils 12/20/2023 6.1  1.6 - 8.3 10e3/uL Final    Absolute Lymphocytes 12/20/2023 0.8  0.8 - 5.3 10e3/uL Final    Absolute Monocytes 12/20/2023 1.5 (H)  0.0 - 1.3 10e3/uL Final    Absolute Eosinophils 12/20/2023 0.2  0.0 - 0.7 10e3/uL Final    Absolute Basophils 12/20/2023 0.1  0.0 - 0.2 10e3/uL Final    Absolute Immature Granulocytes 12/20/2023 0.2  <=0.4 10e3/uL Final    Absolute NRBCs 12/20/2023 0.0  10e3/uL Final   Office Visit on 11/08/2023   Component Date Value Ref Range Status    Sodium 11/08/2023 135  135 - 145 mmol/L Final    Potassium 11/08/2023 3.9  3.4 - 5.3 mmol/L Final    Chloride 11/08/2023 101  98 - 107 mmol/L Final    Carbon Dioxide (CO2) 11/08/2023 22  22 - 29 mmol/L Final    Anion Gap 11/08/2023 12  7 - 15 mmol/L Final    Urea Nitrogen 11/08/2023 14.4  8.0 - 23.0 mg/dL Final    Creatinine 11/08/2023 1.15  0.67 - 1.17 mg/dL Final    GFR Estimate 11/08/2023 66  >60 mL/min/1.73m2 Final    Calcium 11/08/2023 9.5  8.8 - 10.2 mg/dL Final    Glucose 11/08/2023 104 (H)  70 - 99 mg/dL Final    Uric Acid 11/08/2023 7.6 (H)  3.4 - 7.0 mg/dL Final    CRP Inflammation 11/08/2023 74.34 (H)  <5.00 mg/L Final    WBC Count 11/08/2023 8.7  4.0 - 11.0 10e3/uL Final    RBC Count 11/08/2023 4.31 (L)  4.40 - 5.90  10e6/uL Final    Hemoglobin 11/08/2023 12.1 (L)  13.3 - 17.7 g/dL Final    Hematocrit 11/08/2023 36.5 (L)  40.0 - 53.0 % Final    MCV 11/08/2023 85  78 - 100 fL Final    MCH 11/08/2023 28.1  26.5 - 33.0 pg Final    MCHC 11/08/2023 33.2  31.5 - 36.5 g/dL Final    RDW 11/08/2023 20.1 (H)  10.0 - 15.0 % Final    Platelet Count 11/08/2023 227  150 - 450 10e3/uL Final    % Neutrophils 11/08/2023 68  % Final    % Lymphocytes 11/08/2023 12  % Final    % Monocytes 11/08/2023 15  % Final    % Eosinophils 11/08/2023 3  % Final    % Basophils 11/08/2023 1  % Final    % Immature Granulocytes 11/08/2023 1  % Final    NRBCs per 100 WBC 11/08/2023 0  <1 /100 Final    Absolute Neutrophils 11/08/2023 6.0  1.6 - 8.3 10e3/uL Final    Absolute Lymphocytes 11/08/2023 1.0  0.8 - 5.3 10e3/uL Final    Absolute Monocytes 11/08/2023 1.3  0.0 - 1.3 10e3/uL Final    Absolute Eosinophils 11/08/2023 0.2  0.0 - 0.7 10e3/uL Final    Absolute Basophils 11/08/2023 0.1  0.0 - 0.2 10e3/uL Final    Absolute Immature Granulocytes 11/08/2023 0.1  <=0.4 10e3/uL Final    Absolute NRBCs 11/08/2023 0.0  10e3/uL Final          ASSESSMENT:   Waqas Ferro presents for cardiology evaluation with known severe ischemic CM, LVEF <30%with history of CAD. Previously followed cardiology through [facility], last visit in June 2021.  Patient describes himself as largely asymptomatic with severe heart failure.  He has not experienced any anginal symptoms in the setting of ischemic heart disease.  He denies any chest pain or pressure, no recent nitroglycerin use.  He has not experienced any dyspnea increased at rest or with exertion.  He is hypotensive on arrival to clinic today and denies any symptoms of lightheadedness or orthostatic lightheadedness related to this.  He has not experienced any palpitations or racing heart.  No syncope events.  He denies noticing any increased edema or weight gain.  He has no specific concerns today.    1. Ischemic cardiomyopathy  2.  Coronary artery disease involving native coronary artery of native heart without angina pectoris  3. History of coronary artery stent placement (2019)  4. Hypotension due to medication  5. Chronic systolic heart failure (H)      PLAN:   Patient with history of ischemic CM, LVEF 22% (TTE 7/2022), NYHA FC II, stage D. No recent heart failure hospitalizations and no end organ dysfunction.   Extensive CAD history with complicated PCI requiring impella support in 2019, see above notes. Denies any recent anginal symptoms and no recent nitroglycerin use.   He will continue on GDMT for HFrEF, limited by soft pressures. BP 84/64 mmHg on arrival today- asymptomatic with decline in renal function. He has been off beta blocker due to hypotension and bradycardia. He will continue on ARB- Losartan 25 mg at bedtime. Recommended he reduce Spironolactone to 12.5 mg in the morning. Consider adding on SGLT2i.  ECG today with narrow QRS, QRS duration 94 ms. Not a candidate for CRT.   There is prior documentation that he has declined primary prevention ICD, his LVEF has remained <35%. Recommended repeat echocardiogram, last TTE in July 2022. If LVEF remains <35% consider primary prevention ICD as functionally he has been quite stable over the last few years.   Consider cardiac rehab for chronic severe HFrEF on optimized GDMT.   He has remain on DAPT due to complexity of his coronary disease, he will continue with this and continue on high intensity statin with LDL goal <75.    Orders Placed This Encounter   Procedures    N terminal pro BNP outpatient    Basic metabolic panel    EKG 12-lead, tracing only (Same Day)    Echocardiogram Complete       Thank you for allowing me to participate in the care of your patient. Please do not hesitate to contact me if you have any questions.     Total time 54 min on date of encounter spent reviewing records, face to face time obtaining HPI, physical exam, reviewing results of recent testing and  counseling on the above diagnoses and recommended plan of care.    Vidya Dowell, APRN CNP [facility]

## 2024-02-05 NOTE — NURSING NOTE
"Chief Complaint   Patient presents with    Referral     Dr Hannah for CHF on 12/202/2023        Initial BP (!) 84/64 (BP Location: Right arm, Patient Position: Sitting, Cuff Size: Adult Large)   Pulse (!) 44   Temp 97.7  F (36.5  C) (Temporal)   Resp 14   Ht 1.6 m (5' 3\")   Wt 72.4 kg (159 lb 9.6 oz)   SpO2 98%   BMI 28.27 kg/m   Estimated body mass index is 28.27 kg/m  as calculated from the following:    Height as of this encounter: 1.6 m (5' 3\").    Weight as of this encounter: 72.4 kg (159 lb 9.6 oz).  Meds Reconciled: complete  Pt is on Aspirin  Pt is on a Statin  Pt is not on Xarelto or Eliquis  Pt is not on a Warfarin   PHQ and/or HAO reviewed. Pt referred to PCP/MH Provider as appropriate.    Sangeeta Yuan LPN         "

## 2024-02-05 NOTE — PATIENT INSTRUCTIONS
You will receive a phone call to schedule echocardiogram.  Labs today.   Reduce Spironolactone to half tab (12.5 mg) daily due to low blood pressure.   Follow-up with cardiology in 3 months, review results of echocardiogram at this visit.

## 2024-03-06 ENCOUNTER — HOSPITAL ENCOUNTER (OUTPATIENT)
Dept: CARDIOLOGY | Facility: OTHER | Age: 79
Discharge: HOME | End: 2024-03-06
Attending: NURSE PRACTITIONER | Admitting: NURSE PRACTITIONER
Payer: COMMERCIAL

## 2024-03-06 DIAGNOSIS — Z95.5 HISTORY OF CORONARY ARTERY STENT PLACEMENT: ICD-10-CM

## 2024-03-06 DIAGNOSIS — I25.10 CORONARY ARTERY DISEASE INVOLVING NATIVE CORONARY ARTERY OF NATIVE HEART WITHOUT ANGINA PECTORIS: ICD-10-CM

## 2024-03-06 DIAGNOSIS — I25.5 ISCHEMIC CARDIOMYOPATHY: ICD-10-CM

## 2024-03-06 LAB — LVEF ECHO: NORMAL

## 2024-03-06 PROCEDURE — 93306 TTE W/DOPPLER COMPLETE: CPT | Mod: 26 | Performed by: INTERNAL MEDICINE

## 2024-03-06 PROCEDURE — 255N000002 HC RX 255 OP 636: Performed by: NURSE PRACTITIONER

## 2024-03-06 PROCEDURE — 999N000208 ECHOCARDIOGRAM COMPLETE

## 2024-03-06 RX ADMIN — PERFLUTREN 4 ML: 6.52 INJECTION, SUSPENSION INTRAVENOUS at 09:12

## 2024-04-26 NOTE — PROGRESS NOTES
06/07/19 1400   Session   Session 60 Day Individualized Treatment Plan   Certified through this date 07/05/19   Cardiac Rehab Assessment   Cardiac Rehab Assessment 6/7/19:Pt continues to come to rehab regularly along with doing most of the education classes.  He is limited by knee pain with walking.  Will f/u with cardiology this month, [facility].  Goal #1-Pt able to exercise a total of 40 minutes.  We are working on increasing his time, but knee pain limits him.  Goal #2-Pt attended nutrition classes and has started making small changes at home.  Does not eat out.  Goal #3- Pt down 4 additional pounds in the past 30 days.  Hopes to keep watching his portion sizes to see additional weight loss.  5/8/2019 30 day evaluation Goal#1 patient is participating at exercise sessions and has increased her exercise to 45 minutes each session Goal#2Continues to monitor salt intake and read labes to avoid fluid overloa Goal#3 Is eating smaller portions and increase exercise to loose weight4/9/19 Initial cardiac rehab visit after NSTEMI on  12/6/19, and PCI 3/14/19 at [facility]. EF 30 %, and CHF problems with dyspnea developed. Patient has been active in home only, due to weather, is dyspnic with activity. He was able to walk 10 minutes, but short of breath and HR exceeding target range. Edema to lower extremities 1+, lungs clear bilaterally. Rhythm sinus. No chest pain, only knees arthritic. Goal #1 Exercise 30-45 minutes, 3 x week by 1 month.  Goal #2 Eat lower salt foods, read labels, and do daily weights, by 1 month, to prevent return to the hospital. Goal # 3 Eat smaller portions and exercise, to loose 1-2 lbs per month.    General Information   Treatment Diagnosis NSTEMI   Date of Treatment Diagnosis 12/06/19   Secondary Treatment Diagnosis Angioplasty   Significant Past CV History History of Heart Failure   Lead up symptoms dyspnea   Hospital Location [facility] Discharge  Physical Therapy    Visit Type: initial evaluation    Relevant History/Co-morbidities: s/p revision of the left ÓSCAR posterior approach, WBAT.   January 2024- pt had an antibiotic spacer put in and was TTWB. reports unable to walk since. was just transferring from bed to wheelchair. has a walker at home    SUBJECTIVE  Patient agreed to participate in therapy this date.  RN in agreement to work with patient for therapy session.  I am going to try. I havent walked since January so it feels very new.   Patient / Family Goal: maximize function and return home    Pain     Location: during mobility, at rest denies pain, also c/o pain in the right knee    At onset of session (out of 10): 5     OBJECTIVE     Cognitive Status   Orientation    - Oriented to: person, place, time and situation  Functional Communication   - Overall Status: within functional limits  Attention Span    - Attention: intact  Following Direction   - follows all commands and directions consistently  Transition Between Tasks   - transitions without difficulty  Executive Function    - Organization: intact  Memory   - intact  Safety Awareness/Insight   - intact  Awareness of Deficits   - fully aware of deficits    Vitals:  BP  Pre therapy 131/62  During therapy 130/62  Post therapy 126/72         Range of Motion (ROM)   (degrees unless noted; active unless noted; norms in ( ); negative=lacking to 0, positive=beyond 0)  WFL: LLE, RLE, except as noted, left hip    Strength  (out of 5 unless noted, standard test position unless noted)   Hip:    - Flexion:         Left: 3, pain         Right: 5    - Abduction:         Left: 3, pain         Right: 5  Knee:    - Flexion:         Left: 3, pain         Right: 5    - Extension:         Left: 3, pain         Right: 5  Ankle:    - Dorsiflexion:         Left: 5         Right: 5    - Plantar Flexion:         Left: 5         Right: 5      Sitting Balance  (PETER = base of support)  Static      - Trial 1 details:  "Date 03/17/19   Signs and Symptoms Post Hospital Discharge None   Outpatient Cardiac Rehab Start Date 04/09/19   Primary Physician Brielle   Primary Physician Follow Up Completed   Cardiologist Dr. Miranda   Cardiologist Follow Up Scheduled  (6/20/19)   Risk Stratification High   Summary of Cath Report   Summary of Cath Report Available   Date Performed 12/09/18   Left Main 40-50      Ramus 90   OM 90   Cath Report Comments see report   Living and Work Status    Living Arrangements and Social Status house;roommate   Support System Live with an adult   Return to Employment Retired   Occupation    Preventative Medications   CMS recommended medications Beta Blocker;Antiplatelets;Lipid Lowering   Fall Risk Screen   Fall screen completed by Cardiac Rehab   Have you fallen 2 or more times in the past year? No   Have you fallen and had an injury in the past year? No   Is patient a fall risk? No   Pain   Patient Currently in Pain Yes   Pain Location knees   Pain Rating 2/10   Pain Description Discomfort   Pain Description Comment arthritis   Physical Assessments   Incisions WNL   Edema None   Right Lung Sounds not assessed   Left Lung Sounds not assessed   Limitations Arthritis   Individualized Treatment Plan   Monitored Sessions Scheduled 36   Monitored Sessions Attended 15   Oxygen   Supplemental Oxygen needed No   Nutrition Management - Weight Management   Assessment Re-assessment   Age 74   Weight 73 kg (161 lb)   Height 1.6 m (5' 2.99\")   BMI (Calculated) 28.53   Goal Weight 70.3 kg (155 lb)   Initial Rate Your Plate Score. Dietary tool to assess eating patterns. Scores range from 24 to 72. The higher the score the healthier the eating pattern.   (given)   Weight Management Comments weight down 4 pounds since admission   Nutrition Management - Lipids   Lipids Labs Not Available   Prescribed Lipid Medication Yes   Statin Intensity Moderate Intensity   Nutrition Management - Diabetes   Diabetes No   Nutrition " independent  Dynamic      - Trial 1 details: independent    Standing Balance  (PETER = base of support)  Firm Surface: Double Leg      - Static, Eyes Open       - Trial 1 details: moderate assist     - Dynamic, Eyes Open       - Trial 1 details: moderate assist      Sensation/Dermatome Testing:    Intact: LLE light touch, RLE light touch    Bed Mobility  - Supine to sit: minimal assist  Assist needed to move the leg out of the bed  Transfers  Assistive devices: gait belt, 2-wheeled walker  - Sit to stand: moderate assist  - Stand to sit: moderate assist    Ambulation / Gait  - Assistive device: gait belt and 2-wheeled walker  - Distance (feet unless otherwise indicated): 15, 15  - Assist Level: moderate assist  - Surface: even  - Description: antalgic, unsteady and step to  Needed one seated rest period     Interventions     Supine    Lower Extremity: Left: heel slides, quad sets, gluteus sets and ankle pumps,   Training provided: activity tolerance, balance retraining, bed mobility training, HEP training, gait training, safety training, use of assistive device and transfer training  Educated in posterior hip precautions  Skilled input: Verbal instruction/cues  Verbal Consent: Writer verbally educated and received verbal consent for hand placement, positioning of patient, and techniques to be performed today from patient for therapist position for techniques as described above and how they are pertinent to the patient's plan of care.  Home Exercise Program/Education Materials: Patient educated in posterior hip precautions provided with written instructions.          Education:   Education provided during session:  - Results of above outlined education: Verbalizes understanding    ASSESSMENT   Patient will benefit from skilled therapy to address listed impairments and functional limitations.  Interferring components: decreased activity tolerance    Discharge needs based on today's assessment:   - Current level of  Management Summary   Dietary Recommendations Low Fat;Low Cholesterol;Low Sodium   Stages of Change for Diet Compliance Action   Interventions Planned Attend Nutrition Education Class(es);Complete Food Diary;Educate on Weight Management Principles;Educate on Benefits of Exercise   Interventions In Progress or Completed Educated on Benefits of Exercise;Attended Nutrition Class(es)   Patient Goals Goal #1   Goal #1 Description Exercise 30-40 minutes 3 x week by 1 month   Goal #1 Target Date 05/08/19   Goal #1 Date Met 06/07/19   Goal #1 Progress Towards Goal met   Nutrition Summary Comments encouraged smaller portions, healthy choices   Nutrition Target Outcome Weight loss .5-1 lb/week (if BMI > 25)   Psychosocial Management   Psychosocial Assessment Re-assessment   Is there history of clinical depression or increased risk of depression?   (unkown)   Current Level of Stress per Patient Report Mild   Current Coping Skills Has Positive Support System   Initial Patient Health Questionnaire -9 Score (PHQ-9) for depression. 5-9 Minimal symptoms, 10-14 Minor depression, 15-19 Major depression, moderately severe, > 20 Major depression, severe  4   Initial [facility] Survey score.  Quality of Life:   If total score > 25 review individual areas where patient rated a 4 or 5.  Consider patients current medical condition and what role that plays on the score.   Adjust treatment protocol to improve areas of concern.  Consider the following:  PHQ9 score, DASI, and re-assessment within the next 30 days to assist with developing treatments.  17   Stages of Change Action   Patient Goal No   Other Core Components - Hypertension   History of or Diagnosis of Hypertension No   Currently taking Anti-Hypertensives Beta blocker   Hypertension Comments BP low, 80's systolic   Other Core Components - Tobacco   History of Tobacco Use Yes   Quit Date or Planned Quit Date   (1980)   Tobacco Use Status Former (Quit > 6 mo ago)   Tobacco Habit  function: significantly below baseline level of function   - Therapy needs at discharge: therapy 5 or more times per week   - Activities of daily living (ADLs) requiring support at discharge: bed mobility, transfers, ambulation and stairs   - Impairments that require further therapy intervention: activity tolerance, balance and pain    Patient performed mobility at min/mod assist level. Patient walked to the door and back with mod assist, very slow and with unsteady gait pattern. Patient needed one seated rest period while walking. Patient has 8 steps to get into the home. Recommending therapy 5/week at this time but recommendations may change pending progress.     AM-PAC  - Generalized Prior Level of Function:         Key: MOD A=moderate assistance, IND/MOD I=independent/modified independent  - Generalized Current Level of Function     - Current Mobility Score: 14       AM-PAC Scoring Key= >21 Modified Independent; 20-21 Supervision; 18-19 Minimal assist; 13-17 Moderate assist; 9-12 Max assist; <9 Total assist        Predicted patient presentation: Low (stable) - Patient comorbidities and complexities, as defined above, will have little effect on progress for prescribed plan of care.    PLAN (while hospitalized)  Suggestions for next session as indicated: Therapeutic exercise, transfers, gait training, stairs.   PT Frequency: Twice per day      PT/OT Mobility Equipment for Discharge: patient owns the needed equipment  Interventions: balance, bed mobility, functional transfer training, gait training and HEP train/position  Agreement to plan and goals: patient agrees with goals and treatment plan        GOALS  Long Term Goals: (to be met by time of discharge from hospital)  Sit to supine: Patient will complete sit to supine independent.  Supine to sit: Patient will complete supine to sit independent.  Sit to stand: Patient will complete sit to stand transfer with 2-wheeled walker, modified independent.   Ambulation  (even): Patient will ambulate on even surface for 150 feet with 2-wheeled walker, modified independent.   3-4 steps: Patient will ambulate 3-4 steps with no device using one rail, modified independent.   Flight of stairs: Patient will ambulate a flight of stairs with using 1 rail.   Documented in the chart in the following areas: Prior Level of Function. Assessment/Plan.      Patient at End of Session:   Location: in chair  Safety measures: alarm system in place/re-engaged, call light within reach and lines intact  Handoff to: nurse      Therapy procedure time and total treatment time can be found documented on the Time Entry flowsheet   Cigarettes   Tobacco Use per Day (average)   (4 ppd)   Years of Tobacco Use 20   Stages of Change Maintenance   Other Core Components Summary   Interventions Planned List benefits of weight management;Educate on importance of maintaining low sodium diet;None: Patient is in maintenance for smoking cessation;Educate on importance of monitoring daily weight;Educate on signs/symptoms and when to call the doctor (i.e. increased edema, dyspnea, fatigue, dizziness/lightheadedness, change in sleep patterns, chest discomfort)   Interventions In Progress or Completed Listed benefits of weight management;Educated on importance of maintaining low sodium diet;Educated on importance of monitoring daily weight;Educated on signs/symptoms and when to call the doctor;Attended Nutrition class(es)   Patient Goals Yes   Goal #1 Description Limit salty foods, do daily weight, take Lasix, note weight gain 3-5 lbs, call doctor by 1 month   Goal #1 Target Date 05/08/19   Goal #1 Date Met 06/07/19   Goal #1 Progress Towards Goal met   Activity/Exercise History   Activity/Exercise Assessment Re-assessment   Number of Days Currently participating in Moderate Physical Activity? 3-4   Number of Days Currently performing  Aerobic Exercise (including rehab)? 3   Number of Minutes per Session Currently of Aerobic Exercise (average)? 40   Current Stage of Change (Physical Activity) Action   Current Stage of Change (Aerobic Exercise) Action   Patient Goals Goal #1   Goal #1 Description Loose 1-2 lbs per 1 month   Goal #1 Target Date 06/07/19   Goal #1 Date Met 06/07/19   Goal #1 Progress Towards Goal met   Activity/Exercise Comments continue increasing time and MET level as hips tolerate   Exercise Assessment   6 Minute Walk Predicted (Male) 1351.22   6 Minute Walk Predicted (Female) 1380.4   Resting HR 95 bpm   Exercise  bpm   Post Exercise HR 92 bpm   Resting /62   Exercise /62   Post Exercise /60   Effort Rating 3   Current  MET Level 2.8   MET Level Goal 4   ECG Rhythm Sinus rhythm   Ectopy None   Current Symptoms Joint pain   Limitations/Restrictions Orthopedic (see comments)   Exercise Prescription   Mode Treadmill;Arm Ergometer   Duration/Time 30-45 min   Frequency 3 daysweek   THR (85% of age predicted max HR) 125.8   OMNI Effort Rating (0-10 Scale) 4-6/10   Progression Aerobic exercise to OMNI rating of 6 or below and at or below THR   Recommended Home Exercise   Type of Exercise Walking   Frequency (days per week) 3   Duration (minutes per session) 15-30 min   Effort Rating Recommended 4-6/10   Current Home Exercise   Type of Exercise Walking, yard work, hauling wood   Frequency (days per week) 3   Duration (minutes per session) 30   Learning Assessment   Learner Patient   Primary Language English   Preferred Learning Style Listening   Barriers to Learning No barriers noted   Patient Education   Education recommended Anatomy and Physiology of the Heart;Exercise Principles;Heart Failure;Medication Overview;Nutrition;Risk Factors;Weight Loss   Education classes attended Risk Factors;Nutrition;Anatomy and Physiology of the Heart;Blood Pressure   Physician cosignature/electronic signature indicates approval of this ITP document. I have established, reviewed and made necessary changes to the individualized treatment plan and exercise prescription for this patient.

## 2024-04-29 ENCOUNTER — TRANSFERRED RECORDS (OUTPATIENT)
Dept: HEALTH INFORMATION MANAGEMENT | Facility: OTHER | Age: 79
End: 2024-04-29
Payer: COMMERCIAL

## 2024-10-25 DIAGNOSIS — I25.10 CORONARY ARTERY DISEASE INVOLVING NATIVE CORONARY ARTERY OF NATIVE HEART WITHOUT ANGINA PECTORIS: Primary | ICD-10-CM

## 2024-10-30 RX ORDER — LOSARTAN POTASSIUM 25 MG/1
TABLET ORAL
Qty: 90 TABLET | Refills: 0 | Status: SHIPPED | OUTPATIENT
Start: 2024-10-30

## 2024-10-30 NOTE — TELEPHONE ENCOUNTER
[facility] sent Rx request for the following:      Requested Prescriptions   Pending Prescriptions Disp Refills    losartan (COZAAR) 25 MG tablet [Pharmacy Med Name: LOSARTAN 25MG TABLET] 30 tablet 0     Sig: TAKE 1 TABLET (25MG) BY MOUTH AT BEDTIME. NEEDS LABS FOR FURTHER REFILLS       Angiotensin-II Receptors Failed - 10/30/2024  5:56 AM        Failed - Has GFR on file in past 12 months and most recent value is normal        Failed - Medication indicated for associated diagnosis     The medication is prescribed for one or more of the following conditions:    Chronic Kidney Disease (CDK)    Heart Failure (HF)    Diabetes, Nephropathy   Hypertension    Coronary Artery Disease (CAD)   Raynaud's Disease   Ischemic cardiomyopathy   Cardiomyopathy   Pulmonary Hypertension         Last Prescription Date:   Noted as historical  Last Fill Qty/Refills:         , R-    Last Office Visit:              12/20/23   Future Office visit:                Routing refill request to provider for review/approval because:  Medication is reported/historical  Avril Kumar RN on 10/30/2024 at 5:57 AM

## 2025-01-23 DIAGNOSIS — I50.23 ACUTE ON CHRONIC SYSTOLIC CONGESTIVE HEART FAILURE (H): ICD-10-CM

## 2025-01-23 DIAGNOSIS — I25.10 CORONARY ARTERY DISEASE INVOLVING NATIVE CORONARY ARTERY OF NATIVE HEART WITHOUT ANGINA PECTORIS: ICD-10-CM

## 2025-01-23 DIAGNOSIS — I50.9 ACUTE ON CHRONIC CONGESTIVE HEART FAILURE, UNSPECIFIED HEART FAILURE TYPE (H): ICD-10-CM

## 2025-01-23 RX ORDER — FUROSEMIDE 40 MG/1
TABLET ORAL
Qty: 450 TABLET | Refills: 0 | Status: SHIPPED | OUTPATIENT
Start: 2025-01-23

## 2025-01-23 RX ORDER — CLOPIDOGREL BISULFATE 75 MG/1
TABLET ORAL
Qty: 90 TABLET | Refills: 0 | Status: SHIPPED | OUTPATIENT
Start: 2025-01-23

## 2025-01-23 RX ORDER — ATORVASTATIN CALCIUM 40 MG/1
40 TABLET, FILM COATED ORAL AT BEDTIME
Qty: 90 TABLET | Refills: 0 | Status: SHIPPED | OUTPATIENT
Start: 2025-01-23

## 2025-01-23 NOTE — TELEPHONE ENCOUNTER
[id number] ([facility] sent Rx request for the following:      Requested Prescriptions   Pending Prescriptions Disp Refills    furosemide (LASIX) 40 MG tablet [Pharmacy Med Name: FUROSEMIDE 40MG TABLET] 450 tablet 4     Sig: TAKE 3 TABLETS (120MG) BY MOUTH IN THE MORNING AND TAKE 2 TABLETS (80MG) IN THE EVENING.   Last Prescription Date:   12/20/23  Last Fill Qty/Refills:         450, R-4      Diuretics (Including Combos) Protocol Failed - 1/23/2025  9:35 AM        Failed - Has GFR on file in past 12 months and most recent value is normal        Failed - Recent (12 mo) or future (90 days) visit within the authorizing provider's specialty       atorvastatin (LIPITOR) 40 MG tablet [Pharmacy Med Name: ATORVASTATIN 40MG TABLET] 90 tablet 4     Sig: TAKE 1 TABLET (40 MG) BY MOUTH AT BEDTIME   Last Prescription Date:   12/20/23  Last Fill Qty/Refills:         90, R-4      Antihyperlipidemic agents Failed - 1/23/2025  9:35 AM        Failed - LDL on file in the past 12 months        Failed - Recent (12 mo) or future (90 days) visit within the authorizing provider's specialty       clopidogrel (PLAVIX) 75 MG tablet [Pharmacy Med Name: CLOPIDOGREL 75MG TABLET] 90 tablet 4     Sig: TAKE 1 TABLET (75 MG) BY MOUTH EVERY DAY   Last Prescription Date:   12/20/23  Last Fill Qty/Refills:         90, R-4      Plavix Failed - 1/23/2025  9:35 AM        Failed - Normal HGB on file in past 12 months     Recent Labs   Lab Test 12/20/23  1520   HGB 13.0*           Failed - Normal Platelets on file in past 12 months     Recent Labs   Lab Test 12/20/23  1520              Failed - Recent (12 mo) or future (90 days) visit within the authorizing provider's specialty        Failed - Medication indicated for associated diagnosis     Medication is associated with one or more of the following diagnoses:  Cerebrovascular accident  Myocardial infarction  Percutaneous coronary intervention  Peripheral arterial  occlusive disease  Transient cerebral ischemia     Last Office Visit:              12/20/23  Future Office visit:           None    Per LOV note:  Return in about 53 weeks (around 12/25/2024) for Annual Wellness Visit.     Pt due for annual exam. Routing to provider for refill consideration. Routing to Unit scheduling pool, to assist Pt in scheduling appointment. Unable to complete prescription refill per RN Medication Refill Policy. Lyric Kelly RN .............. 1/23/2025  9:40 AM

## 2025-01-23 NOTE — TELEPHONE ENCOUNTER
Patient was contacted and an appointment was made with Dr. Hannah for 2.4.2025.  Chhaya Alegre on 1/23/2025 at 9:52 AM

## 2025-02-04 ENCOUNTER — OFFICE VISIT (OUTPATIENT)
Dept: FAMILY MEDICINE | Facility: OTHER | Age: 80
End: 2025-02-04
Attending: FAMILY MEDICINE
Payer: COMMERCIAL

## 2025-02-04 DIAGNOSIS — Z00.00 ENCOUNTER FOR MEDICARE ANNUAL WELLNESS EXAM: Primary | ICD-10-CM

## 2025-02-04 DIAGNOSIS — I50.23 ACUTE ON CHRONIC SYSTOLIC CONGESTIVE HEART FAILURE (H): ICD-10-CM

## 2025-02-04 DIAGNOSIS — N18.31 CHRONIC KIDNEY DISEASE, STAGE 3A (H): ICD-10-CM

## 2025-02-04 DIAGNOSIS — I25.10 CORONARY ARTERY DISEASE INVOLVING NATIVE CORONARY ARTERY OF NATIVE HEART WITHOUT ANGINA PECTORIS: ICD-10-CM

## 2025-02-04 DIAGNOSIS — I50.9 ACUTE ON CHRONIC CONGESTIVE HEART FAILURE, UNSPECIFIED HEART FAILURE TYPE (H): ICD-10-CM

## 2025-02-04 LAB
ALBUMIN SERPL BCG-MCNC: 4.2 G/DL (ref 3.5–5.2)
ALP SERPL-CCNC: 58 U/L (ref 40–150)
ALT SERPL W P-5'-P-CCNC: 33 U/L (ref 0–70)
ANION GAP SERPL CALCULATED.3IONS-SCNC: 12 MMOL/L (ref 7–15)
AST SERPL W P-5'-P-CCNC: 31 U/L (ref 0–45)
BILIRUB SERPL-MCNC: 0.7 MG/DL
BUN SERPL-MCNC: 45.9 MG/DL (ref 8–23)
CALCIUM SERPL-MCNC: 9.1 MG/DL (ref 8.8–10.4)
CHLORIDE SERPL-SCNC: 100 MMOL/L (ref 98–107)
CHOLEST SERPL-MCNC: 72 MG/DL
CREAT SERPL-MCNC: 1.79 MG/DL (ref 0.67–1.17)
EGFRCR SERPLBLD CKD-EPI 2021: 39 ML/MIN/1.73M2
ERYTHROCYTE [DISTWIDTH] IN BLOOD BY AUTOMATED COUNT: 16.6 % (ref 10–15)
FASTING STATUS PATIENT QL REPORTED: NO
FASTING STATUS PATIENT QL REPORTED: NO
GLUCOSE SERPL-MCNC: 107 MG/DL (ref 70–99)
HCO3 SERPL-SCNC: 24 MMOL/L (ref 22–29)
HCT VFR BLD AUTO: 36 % (ref 40–53)
HDLC SERPL-MCNC: 35 MG/DL
HGB BLD-MCNC: 11.6 G/DL (ref 13.3–17.7)
LDLC SERPL CALC-MCNC: 23 MG/DL
MCH RBC QN AUTO: 28.6 PG (ref 26.5–33)
MCHC RBC AUTO-ENTMCNC: 32.2 G/DL (ref 31.5–36.5)
MCV RBC AUTO: 89 FL (ref 78–100)
NONHDLC SERPL-MCNC: 37 MG/DL
PLATELET # BLD AUTO: 250 10E3/UL (ref 150–450)
POTASSIUM SERPL-SCNC: 4.1 MMOL/L (ref 3.4–5.3)
PROT SERPL-MCNC: 8.6 G/DL (ref 6.4–8.3)
RBC # BLD AUTO: 4.06 10E6/UL (ref 4.4–5.9)
SODIUM SERPL-SCNC: 136 MMOL/L (ref 135–145)
TRIGL SERPL-MCNC: 72 MG/DL
WBC # BLD AUTO: 7.1 10E3/UL (ref 4–11)

## 2025-02-04 PROCEDURE — 84155 ASSAY OF PROTEIN SERUM: CPT | Mod: ZL | Performed by: FAMILY MEDICINE

## 2025-02-04 PROCEDURE — 36415 COLL VENOUS BLD VENIPUNCTURE: CPT | Mod: ZL | Performed by: FAMILY MEDICINE

## 2025-02-04 PROCEDURE — 85048 AUTOMATED LEUKOCYTE COUNT: CPT | Mod: ZL | Performed by: FAMILY MEDICINE

## 2025-02-04 PROCEDURE — G0463 HOSPITAL OUTPT CLINIC VISIT: HCPCS | Mod: 25

## 2025-02-04 PROCEDURE — 85018 HEMOGLOBIN: CPT | Mod: ZL | Performed by: FAMILY MEDICINE

## 2025-02-04 PROCEDURE — 82565 ASSAY OF CREATININE: CPT | Mod: ZL | Performed by: FAMILY MEDICINE

## 2025-02-04 PROCEDURE — 82465 ASSAY BLD/SERUM CHOLESTEROL: CPT | Mod: ZL | Performed by: FAMILY MEDICINE

## 2025-02-04 RX ORDER — FUROSEMIDE 40 MG/1
TABLET ORAL
Qty: 450 TABLET | Refills: 4 | Status: SHIPPED | OUTPATIENT
Start: 2025-02-04

## 2025-02-04 RX ORDER — METOPROLOL SUCCINATE 100 MG/1
50 TABLET, EXTENDED RELEASE ORAL 2 TIMES DAILY
COMMUNITY
Start: 2025-02-03 | End: 2025-02-04

## 2025-02-04 RX ORDER — LOSARTAN POTASSIUM 25 MG/1
25 TABLET ORAL DAILY
Qty: 90 TABLET | Refills: 4 | Status: SHIPPED | OUTPATIENT
Start: 2025-02-04

## 2025-02-04 RX ORDER — ATORVASTATIN CALCIUM 40 MG/1
40 TABLET, FILM COATED ORAL AT BEDTIME
Qty: 90 TABLET | Refills: 4 | Status: SHIPPED | OUTPATIENT
Start: 2025-02-04

## 2025-02-04 RX ORDER — SPIRONOLACTONE 25 MG/1
12.5 TABLET ORAL DAILY
Qty: 45 TABLET | Refills: 4 | Status: SHIPPED | OUTPATIENT
Start: 2025-02-04

## 2025-02-04 RX ORDER — CLOPIDOGREL BISULFATE 75 MG/1
75 TABLET ORAL DAILY
Qty: 90 TABLET | Refills: 4 | Status: SHIPPED | OUTPATIENT
Start: 2025-02-04

## 2025-02-04 RX ORDER — NITROGLYCERIN 0.4 MG/1
TABLET SUBLINGUAL
Qty: 6 TABLET | Refills: 11 | Status: SHIPPED | OUTPATIENT
Start: 2025-02-04

## 2025-02-04 RX ORDER — METOPROLOL SUCCINATE 100 MG/1
50 TABLET, EXTENDED RELEASE ORAL 2 TIMES DAILY
Qty: 90 TABLET | Refills: 4 | Status: SHIPPED | OUTPATIENT
Start: 2025-02-04

## 2025-02-04 NOTE — PATIENT INSTRUCTIONS
Patient Education   Preventive Care Advice   This is general advice given by our system to help you stay healthy. However, your care team may have specific advice just for you. Please talk to your care team about your preventive care needs.  Nutrition  Eat 5 or more servings of fruits and vegetables each day.  Try wheat bread, brown rice and whole grain pasta (instead of white bread, rice, and pasta).  Get enough calcium and vitamin D. Check the label on foods and aim for 100% of the RDA (recommended daily allowance).  Lifestyle  Exercise at least 150 minutes each week  (30 minutes a day, 5 days a week).  Do muscle strengthening activities 2 days a week. These help control your weight and prevent disease.  No smoking.  Wear sunscreen to prevent skin cancer.  Have a dental exam and cleaning every 6 months.  Yearly exams  See your health care team every year to talk about:  Any changes in your health.  Any medicines your care team has prescribed.  Preventive care, family planning, and ways to prevent chronic diseases.  Shots (vaccines)   HPV shots (up to age 26), if you've never had them before.  Hepatitis B shots (up to age 59), if you've never had them before.  COVID-19 shot: Get this shot when it's due.  Flu shot: Get a flu shot every year.  Tetanus shot: Get a tetanus shot every 10 years.  Pneumococcal, hepatitis A, and RSV shots: Ask your care team if you need these based on your risk.  Shingles shot (for age 50 and up)  General health tests  Diabetes screening:  Starting at age 35, Get screened for diabetes at least every 3 years.  If you are younger than age 35, ask your care team if you should be screened for diabetes.  Cholesterol test: At age 39, start having a cholesterol test every 5 years, or more often if advised.  Bone density scan (DEXA): At age 50, ask your care team if you should have this scan for osteoporosis (brittle bones).  Hepatitis C: Get tested at least once in your life.  STIs (sexually  transmitted infections)  Before age 24: Ask your care team if you should be screened for STIs.  After age 24: Get screened for STIs if you're at risk. You are at risk for STIs (including HIV) if:  You are sexually active with more than one person.  You don't use condoms every time.  You or a partner was diagnosed with a sexually transmitted infection.  If you are at risk for HIV, ask about PrEP medicine to prevent HIV.  Get tested for HIV at least once in your life, whether you are at risk for HIV or not.  Cancer screening tests  Cervical cancer screening: If you have a cervix, begin getting regular cervical cancer screening tests starting at age 21.  Breast cancer scan (mammogram): If you've ever had breasts, begin having regular mammograms starting at age 40. This is a scan to check for breast cancer.  Colon cancer screening: It is important to start screening for colon cancer at age 45.  Have a colonoscopy test every 10 years (or more often if you're at risk) Or, ask your provider about stool tests like a FIT test every year or Cologuard test every 3 years.  To learn more about your testing options, visit:   .  For help making a decision, visit:   https://bit.ly/mm36480.  Prostate cancer screening test: If you have a prostate, ask your care team if a prostate cancer screening test (PSA) at age 55 is right for you.  Lung cancer screening: If you are a current or former smoker ages 50 to 80, ask your care team if ongoing lung cancer screenings are right for you.  For informational purposes only. Not to replace the advice of your health care provider. Copyright   2023 [facility]. All rights reserved. Clinically reviewed by the [facility] Transitions Program. [employer] 487862 - REV 01/24.  Learning About Activities of Daily Living  What are activities of daily living?     Activities of daily living (ADLs) are the basic self-care tasks you do every day. These include eating, bathing, dressing,  and moving around.  As you age, and if you have health problems, you may find that it's harder to do some of these tasks. If so, your doctor can suggest ideas that may help.  To measure what kind of help you may need, your doctor will ask how well you are able to do ADLs. Let your doctor know if there are any tasks that you are having trouble doing. This is an important first step to getting help. And when you have the help you need, you can stay as independent as possible.  How will a doctor assess your ADLs?  Asking about ADLs is part of a routine health checkup your doctor will likely do as you age. Your health check might be done in a doctor's office, in your home, or at a hospital. The goal is to find out if you are having any problems that could make it hard to care for yourself or that make it unsafe for you to be on your own.  To measure your ADLs, your doctor will ask how hard it is for you to do routine tasks. Your doctor may also want to know if you have changed the way you do a task because of a health problem. Your doctor may watch how you:  Walk back and forth.  Keep your balance while you stand or walk.  Move from sitting to standing or from a bed to a chair.  Button or unbutton a shirt or sweater.  Remove and put on your shoes.  It's common to feel a little worried or anxious if you find you can't do all the things you used to be able to do. Talking with your doctor about ADLs is a way to make sure you're as safe as possible and able to care for yourself as well as you can. You may want to bring a caregiver, friend, or family member to your checkup. They can help you talk to your doctor.  Follow-up care is a key part of your treatment and safety. Be sure to make and go to all appointments, and call your doctor if you are having problems. It's also a good idea to know your test results and keep a list of the medicines you take.  Current as of: October 24, 2023  Content Version: 14.3    2024 [facility]  [employer]   Care instructions adapted under license by your healthcare professional. If you have questions about a medical condition or this instruction, always ask your healthcare professional. [facility] [employer] disclaims any warranty or liability for your use of this information.

## 2025-02-04 NOTE — NURSING NOTE
Patient here for annual medicare wellness visit. And refills. Medication Reconciliation: complete.    Kajal Red LPN  2/4/2025 11:05 AM

## 2025-02-04 NOTE — LETTER
February 4, 2025      Waqas Ferro  [address] [place] [address]        Dear ,    We are writing to inform you of your test results.    As you can see your kidney function which is your GFR is slightly decreased than previous.  This likely represents that you are slightly dehydrated.  Hopefully changing the Aldactone to half a pill a day will improve that.  We will follow-up next visit and repeat your kidney function test.  If you are doing well we may want to stop the Aldactone altogether.  I can discuss these labs further at your next visit.    Resulted Orders   Lipid Profile   Result Value Ref Range    Cholesterol 72 <200 mg/dL    Triglycerides 72 <150 mg/dL    Direct Measure HDL 35 (L) >=40 mg/dL    LDL Cholesterol Calculated 23 <100 mg/dL    Non HDL Cholesterol 37 <130 mg/dL    Patient Fasting > 8hrs? No     Narrative    Cholesterol  Desirable: < 200 mg/dL  Borderline High: 200 - 239 mg/dL  High: >= 240 mg/dL    Triglycerides  Normal: < 150 mg/dL  Borderline High: 150 - 199 mg/dL  High: 200-499 mg/dL  Very High: >= 500 mg/dL    Direct Measure HDL  Female: >= 50 mg/dL   Male: >= 40 mg/dL    LDL Cholesterol  Desirable: < 100 mg/dL  Above Desirable: 100 - 129 mg/dL   Borderline High: 130 - 159 mg/dL   High:  160 - 189 mg/dL   Very High: >= 190 mg/dL    Non HDL Cholesterol  Desirable: < 130 mg/dL  Above Desirable: 130 - 159 mg/dL  Borderline High: 160 - 189 mg/dL  High: 190 - 219 mg/dL  Very High: >= 220 mg/dL   CBC with Platelets   Result Value Ref Range    WBC Count 7.1 4.0 - 11.0 10e3/uL    RBC Count 4.06 (L) 4.40 - 5.90 10e6/uL    Hemoglobin 11.6 (L) 13.3 - 17.7 g/dL    Hematocrit 36.0 (L) 40.0 - 53.0 %    MCV 89 78 - 100 fL    MCH 28.6 26.5 - 33.0 pg    MCHC 32.2 31.5 - 36.5 g/dL    RDW 16.6 (H) 10.0 - 15.0 %    Platelet Count 250 150 - 450 10e3/uL   Comprehensive Metabolic Panel   Result Value Ref Range    Sodium 136 135 - 145 mmol/L    Potassium 4.1 3.4 - 5.3 mmol/L    Carbon  Dioxide (CO2) 24 22 - 29 mmol/L    Anion Gap 12 7 - 15 mmol/L    Urea Nitrogen 45.9 (H) 8.0 - 23.0 mg/dL    Creatinine 1.79 (H) 0.67 - 1.17 mg/dL    GFR Estimate 39 (L) >60 mL/min/1.73m2      Comment:      eGFR calculated using 2021 CKD-EPI equation.    Calcium 9.1 8.8 - 10.4 mg/dL    Chloride 100 98 - 107 mmol/L    Glucose 107 (H) 70 - 99 mg/dL    Alkaline Phosphatase 58 40 - 150 U/L    AST 31 0 - 45 U/L    ALT 33 0 - 70 U/L    Protein Total 8.6 (H) 6.4 - 8.3 g/dL    Albumin 4.2 3.5 - 5.2 g/dL    Bilirubin Total 0.7 <=1.2 mg/dL    Patient Fasting > 8hrs? No        If you have any questions or concerns, please call the clinic at the number listed above.       Sincerely,      Hugo Hannah MD    Electronically signed

## 2025-02-04 NOTE — PROGRESS NOTES
Preventive Care Visit  [facility]  Hugo Hannah MD, Family Medicine  Feb 4, 2025      Assessment & Plan     Encounter for Medicare annual wellness exam  Satisfactory.    Acute on chronic congestive heart failure, unspecified heart failure type (H)  Medications refilled.  Patient is being followed by cardiology followed by cardiology  - atorvastatin (LIPITOR) 40 MG tablet; Take 1 tablet (40 mg) by mouth at bedtime.  - spironolactone (ALDACTONE) 25 MG tablet; Take 0.5 tablets (12.5 mg) by mouth daily.  - metoprolol succinate ER (TOPROL XL) 100 MG 24 hr tablet; Take 0.5 tablets (50 mg) by mouth 2 times daily.  - CBC with Platelets; Future  - Comprehensive Metabolic Panel; Future  - CBC with Platelets  - Comprehensive Metabolic Panel    Coronary artery disease involving native coronary artery of native heart without angina pectoris  cardiology  - clopidogrel (PLAVIX) 75 MG tablet; Take 1 tablet (75 mg) by mouth daily.  - losartan (COZAAR) 25 MG tablet; Take 1 tablet (25 mg) by mouth daily.  - nitroGLYcerin (NITROSTAT) 0.4 MG sublingual tablet; For chest pain place 1 tablet under the tongue every 5 minutes for 3 doses. If symptoms persist 5 minutes after 1st dose call 911.  - Lipid Profile; Future  - Lipid Profile    Acute on chronic systolic congestive heart failure (H)  Patient's blood pressure fairly low.  Most recent visit with cardiology recommendation was to decrease his Aldactone to 12 and half milligrams a day.  He does bring with him his pills that he has not decreased it.  Will change his medication to Aldactone half a tablet a day.  Continue with cardiology follow-ups.  - furosemide (LASIX) 40 MG tablet; TAKE 3 TABLETS (120MG) BY MOUTH IN THE MORNING AND TAKE 2 TABLETS (80MG) IN THE EVENING.    Chronic kidney disease, stage 3a (H)  GFR is decreased a.  Likely the result of a prerenal azotemia.  Especially with an elevated BUN.  Will have patient follow-up after couple weeks and the  change in his Aldactone.  Results for orders placed or performed in visit on 02/04/25   Lipid Profile     Status: Abnormal   Result Value Ref Range    Cholesterol 72 <200 mg/dL    Triglycerides 72 <150 mg/dL    Direct Measure HDL 35 (L) >=40 mg/dL    LDL Cholesterol Calculated 23 <100 mg/dL    Non HDL Cholesterol 37 <130 mg/dL    Patient Fasting > 8hrs? No     Narrative    Cholesterol  Desirable: < 200 mg/dL  Borderline High: 200 - 239 mg/dL  High: >= 240 mg/dL    Triglycerides  Normal: < 150 mg/dL  Borderline High: 150 - 199 mg/dL  High: 200-499 mg/dL  Very High: >= 500 mg/dL    Direct Measure HDL  Female: >= 50 mg/dL   Male: >= 40 mg/dL    LDL Cholesterol  Desirable: < 100 mg/dL  Above Desirable: 100 - 129 mg/dL   Borderline High: 130 - 159 mg/dL   High:  160 - 189 mg/dL   Very High: >= 190 mg/dL    Non HDL Cholesterol  Desirable: < 130 mg/dL  Above Desirable: 130 - 159 mg/dL  Borderline High: 160 - 189 mg/dL  High: 190 - 219 mg/dL  Very High: >= 220 mg/dL   CBC with Platelets     Status: Abnormal   Result Value Ref Range    WBC Count 7.1 4.0 - 11.0 10e3/uL    RBC Count 4.06 (L) 4.40 - 5.90 10e6/uL    Hemoglobin 11.6 (L) 13.3 - 17.7 g/dL    Hematocrit 36.0 (L) 40.0 - 53.0 %    MCV 89 78 - 100 fL    MCH 28.6 26.5 - 33.0 pg    MCHC 32.2 31.5 - 36.5 g/dL    RDW 16.6 (H) 10.0 - 15.0 %    Platelet Count 250 150 - 450 10e3/uL   Comprehensive Metabolic Panel     Status: Abnormal   Result Value Ref Range    Sodium 136 135 - 145 mmol/L    Potassium 4.1 3.4 - 5.3 mmol/L    Carbon Dioxide (CO2) 24 22 - 29 mmol/L    Anion Gap 12 7 - 15 mmol/L    Urea Nitrogen 45.9 (H) 8.0 - 23.0 mg/dL    Creatinine 1.79 (H) 0.67 - 1.17 mg/dL    GFR Estimate 39 (L) >60 mL/min/1.73m2    Calcium 9.1 8.8 - 10.4 mg/dL    Chloride 100 98 - 107 mmol/L    Glucose 107 (H) 70 - 99 mg/dL    Alkaline Phosphatase 58 40 - 150 U/L    AST 31 0 - 45 U/L    ALT 33 0 - 70 U/L    Protein Total 8.6 (H) 6.4 - 8.3 g/dL    Albumin 4.2 3.5 - 5.2 g/dL    Bilirubin  "Total 0.7 <=1.2 mg/dL    Patient Fasting > 8hrs? No              BMI  Estimated body mass index is 28.27 kg/m  as calculated from the following:    Height as of 2/5/24: 1.6 m (5' 3\").    Weight as of 2/5/24: 72.4 kg (159 lb 9.6 oz).       Counseling  Appropriate preventive services were addressed with this patient via screening, questionnaire, or discussion as appropriate for fall prevention, nutrition, physical activity, Tobacco-use cessation, social engagement, weight loss and cognition.  Checklist reviewing preventive services available has been given to the patient.  Reviewed patient's diet, addressing concerns and/or questions.   The patient was instructed to see the dentist every 6 months.   Updated plan of care.  Patient reported difficulty with activities of daily living were addressed today.        No follow-ups on file.    Smita Alan is a 79 year old, presenting for the following:  Wellness Visit            History of Present Illness       Reason for visit:  Wellness   He is taking medications regularly.    Patient arrives here for Medicare wellness.  Also medication refill.  He does see cardiology on a regular basis.  Does have a history of markedly decrease in ejection fraction.  His blood pressure is low but currently asymptomatic.  No lightheadedness or dizziness.        Health Care Directive  Patient does not have a Health Care Directive: Discussed advance care planning with patient; information given to patient to review.      2/4/2025   General Health   How would you rate your overall physical health? (!) POOR   Feel stress (tense, anxious, or unable to sleep) Only a little   (!) STRESS CONCERN      2/4/2025   Nutrition   Diet: Low salt    I don't know       Multiple values from one day are sorted in reverse-chronological order         12/20/2023   Exercise   Frequency of exercise: 2-3 days/week         2/4/2025   Social Factors   Worry food won't last until get money to buy more No   Food " not last or not have enough money for food? No   Do you have housing? (Housing is defined as stable permanent housing and does not include staying ouside in a car, in a tent, in an abandoned building, in an overnight shelter, or couch-surfing.) Yes   Are you worried about losing your housing? No   Lack of transportation? No   Unable to get utilities (heat,electricity)? No         2/4/2025   Fall Risk   Fallen 2 or more times in the past year? No     No   Trouble with walking or balance? No     No       Proxy-reported    Multiple values from one day are sorted in reverse-chronological order          2/4/2025   Activities of Daily Living- Home Safety   Needs help with the following daily activites Laundry    None of the above   Safety concerns in the home None of the above       Multiple values from one day are sorted in reverse-chronological order         2/4/2025   Dental   Dentist two times every year? (!) NO         2/4/2025   Hearing Screening   Hearing concerns? None of the above         2/4/2025   Driving Risk Screening   Patient/family members have concerns about driving No         2/4/2025   General Alertness/Fatigue Screening   Have you been more tired than usual lately? No         2/4/2025   Urinary Incontinence Screening   Bothered by leaking urine in past 6 months No         2/4/2025   TB Screening   Were you born outside of the [place]? No         Today's PHQ-2 Score:       2/4/2025    11:23 AM   PHQ-2 ( 1999 [employer])   Q1: Little interest or pleasure in doing things 0    Q2: Feeling down, depressed or hopeless 0    PHQ-2 Score 0    Q1: Little interest or pleasure in doing things Not at all   Q2: Feeling down, depressed or hopeless Not at all   PHQ-2 Score 0       Proxy-reported           2/4/2025   Substance Use   Alcohol more than 3/day or more than 7/wk No   Do you have a current opioid prescription? No   How severe/bad is pain from 1 to 10? 7/10   Do you use any other substances recreationally? No      Social History     Tobacco Use    Smoking status: Former     Current packs/day: 0.00     Average packs/day: 4.0 packs/day for 20.0 years (80.0 ttl pk-yrs)     Types: Cigarettes     Start date:      Quit date:      Years since quittin.1    Smokeless tobacco: Never   Vaping Use    Vaping status: Never Used   Substance Use Topics    Alcohol use: Yes     Alcohol/week: 2.0 standard drinks of alcohol     Types: 2 Cans of beer per week     Comment: 4 a month     Drug use: No       ASCVD Risk   The ASCVD Risk score (Vikki LEVINE, et al., 2019) failed to calculate for the following reasons:    The valid systolic blood pressure range is 90 to 200 mmHg    The valid total cholesterol range is 130 to 320 mg/dL            Reviewed and updated as needed this visit by Provider                      Current providers sharing in care for this patient include:  Patient Care Team:  Hugo Hannah MD as PCP - General (Family Practice)  Hugo Hannah MD as Assigned PCP  Vidya Dowell APRN CNP as Assigned Heart and Vascular Provider    The following health maintenance items are reviewed in Epic and correct as of today:  Health Maintenance   Topic Date Due    HF ACTION PLAN  Never done    DTAP/TDAP/TD IMMUNIZATION (1 - Tdap) Never done    RSV VACCINE (1 - 1-dose 75+ series) Never done    BMP  2024    MEDICARE ANNUAL WELLNESS VISIT  2024    ALT  2024    LIPID  2024    CBC  2024    COVID-19 Vaccine (3 - 2024-25 season) 2025    FALL RISK ASSESSMENT  2026    GLUCOSE  2027    ADVANCE CARE PLANNING  2028    TSH W/FREE T4 REFLEX  Completed    HEPATITIS C SCREENING  Completed    PHQ-2 (once per calendar year)  Completed    INFLUENZA VACCINE  Completed    Pneumococcal Vaccine: 50+ Years  Completed    ZOSTER IMMUNIZATION  Completed    HPV IMMUNIZATION  Aged Out    MENINGITIS IMMUNIZATION  Aged Out            Objective    Exam  There were no vitals taken for this  "visit.   Estimated body mass index is 28.27 kg/m  as calculated from the following:    Height as of 2/5/24: 1.6 m (5' 3\").    Weight as of 2/5/24: 72.4 kg (159 lb 9.6 oz).    Physical Exam  GENERAL: alert and no distress  NECK: no adenopathy, no asymmetry, masses, or scars  RESP: lungs clear to auscultation - no rales, rhonchi or wheezes  CV: regular rate and rhythm, normal S1 S2, no S3 or S4, no murmur, click or rub, no peripheral edema  ABDOMEN: soft, nontender, no hepatosplenomegaly, no masses and bowel sounds normal  MS: no gross musculoskeletal defects noted, no edema         No data to display                   The longitudinal plan of care for the diagnosis(es)/condition(s) as documented were addressed during this visit. Due to the added complexity in care, I will continue to support Waqas in the subsequent management and with ongoing continuity of care.      Signed Electronically by: Hugo Hannah MD    "

## (undated) RX ORDER — FUROSEMIDE 10 MG/ML
INJECTION INTRAMUSCULAR; INTRAVENOUS
Status: DISPENSED
Start: 2022-07-24

## (undated) RX ORDER — ASPIRIN 81 MG/1
TABLET, CHEWABLE ORAL
Status: DISPENSED
Start: 2018-12-06

## (undated) RX ORDER — METHYLPREDNISOLONE SODIUM SUCCINATE 125 MG/2ML
INJECTION, POWDER, LYOPHILIZED, FOR SOLUTION INTRAMUSCULAR; INTRAVENOUS
Status: DISPENSED
Start: 2023-09-13

## (undated) RX ORDER — HEPARIN SODIUM 10000 [USP'U]/100ML
INJECTION, SOLUTION INTRAVENOUS
Status: DISPENSED
Start: 2018-12-06

## (undated) RX ORDER — HYDROMORPHONE HYDROCHLORIDE 1 MG/ML
INJECTION, SOLUTION INTRAMUSCULAR; INTRAVENOUS; SUBCUTANEOUS
Status: DISPENSED
Start: 2023-09-13

## (undated) RX ORDER — FUROSEMIDE 10 MG/ML
INJECTION INTRAMUSCULAR; INTRAVENOUS
Status: DISPENSED
Start: 2018-12-06

## (undated) RX ORDER — HEPARIN SODIUM 5000 [USP'U]/.5ML
INJECTION, SOLUTION INTRAVENOUS; SUBCUTANEOUS
Status: DISPENSED
Start: 2018-12-06

## (undated) RX ORDER — FENTANYL CITRATE 50 UG/ML
INJECTION, SOLUTION INTRAMUSCULAR; INTRAVENOUS
Status: DISPENSED
Start: 2023-09-13

## (undated) RX ORDER — HYDROMORPHONE HCL IN WATER/PF 6 MG/30 ML
PATIENT CONTROLLED ANALGESIA SYRINGE INTRAVENOUS
Status: DISPENSED
Start: 2023-09-13